# Patient Record
Sex: FEMALE | Race: BLACK OR AFRICAN AMERICAN | Employment: FULL TIME | URBAN - METROPOLITAN AREA
[De-identification: names, ages, dates, MRNs, and addresses within clinical notes are randomized per-mention and may not be internally consistent; named-entity substitution may affect disease eponyms.]

---

## 2017-01-11 ENCOUNTER — DOCUMENTATION ONLY (OUTPATIENT)
Dept: NEUROLOGY | Age: 42
End: 2017-01-11

## 2017-03-09 ENCOUNTER — OFFICE VISIT (OUTPATIENT)
Dept: NEUROLOGY | Age: 42
End: 2017-03-09

## 2017-03-09 VITALS
HEIGHT: 61 IN | BODY MASS INDEX: 55.32 KG/M2 | DIASTOLIC BLOOD PRESSURE: 62 MMHG | SYSTOLIC BLOOD PRESSURE: 110 MMHG | WEIGHT: 293 LBS | HEART RATE: 89 BPM | OXYGEN SATURATION: 98 %

## 2017-03-09 DIAGNOSIS — E11.40 TYPE 2 DIABETES MELLITUS WITH DIABETIC NEUROPATHY, WITHOUT LONG-TERM CURRENT USE OF INSULIN (HCC): ICD-10-CM

## 2017-03-09 DIAGNOSIS — G93.2 PSEUDOTUMOR CEREBRI: Primary | ICD-10-CM

## 2017-03-09 DIAGNOSIS — F51.01 PRIMARY INSOMNIA: ICD-10-CM

## 2017-03-09 DIAGNOSIS — M79.7 FIBROMYALGIA: ICD-10-CM

## 2017-03-09 DIAGNOSIS — G47.01 INSOMNIA DUE TO MEDICAL CONDITION: ICD-10-CM

## 2017-03-09 RX ORDER — METHOCARBAMOL 750 MG/1
TABLET, FILM COATED ORAL 4 TIMES DAILY
COMMUNITY
End: 2019-05-01 | Stop reason: SDUPTHER

## 2017-03-09 RX ORDER — DIGOXIN 125 MCG
0.12 TABLET ORAL DAILY
Qty: 30 TAB | Refills: 3 | Status: SHIPPED | OUTPATIENT
Start: 2017-03-09 | End: 2017-09-26 | Stop reason: SDUPTHER

## 2017-03-09 RX ORDER — QUETIAPINE FUMARATE 50 MG/1
50 TABLET, FILM COATED ORAL
Qty: 30 TAB | Refills: 4 | Status: SHIPPED | OUTPATIENT
Start: 2017-03-09 | End: 2017-08-21 | Stop reason: SDUPTHER

## 2017-03-09 RX ORDER — ONDANSETRON 4 MG/1
4 TABLET, FILM COATED ORAL
Qty: 20 TAB | Refills: 4 | Status: SHIPPED | OUTPATIENT
Start: 2017-03-09 | End: 2018-10-05

## 2017-03-09 NOTE — MR AVS SNAPSHOT
Visit Information Date & Time Provider Department Dept. Phone Encounter #  
 3/9/2017 11:40 AM Jose Manuel Beltre MD [facility] at [facility] [phone] [id number] Follow-up Instructions Return in about 1 month (around 4/9/2017). Upcoming Health Maintenance Date Due HEMOGLOBIN A1C Q6M 1973 LIPID PANEL Q1 1973 FOOT EXAM Q1 11/4/1983 MICROALBUMIN Q1 11/4/1983 EYE EXAM RETINAL OR DILATED Q1 11/4/1983 Pneumococcal 19-64 Medium Risk (1 of 1 - PPSV23) 11/4/1992 DTaP/Tdap/Td series (1 - Tdap) 11/4/1994 PAP AKA CERVICAL CYTOLOGY 11/4/1994 INFLUENZA AGE 9 TO ADULT 8/1/2016 Allergies as of 3/9/2017  Review Complete On: 3/9/2017 By: Brandy Quiroga Severity Noted Reaction Type Reactions Lemon High 08/07/2012    Anaphylaxis Adhesive Tape-silicones  84/26/7488    Other (comments) Pt reports it burns her skin Dilaudid [Hydromorphone (Pf)]  06/21/2010    Itching Fentanyl  06/21/2010    Shortness of Breath Percocet [Oxycodone-acetaminophen]  06/21/2010    Unknown (comments) Pt states not allergic Current Immunizations  Reviewed on 7/14/2015 Name Date Influenza Vaccine PF  Deferred (Patient Refused) Influenza Vaccine Whole 2/1/2011 Pneumococcal Polysaccharide (PPSV-23)  Deferred (Patient Refused) Not reviewed this visit You Were Diagnosed With   
  
 Codes Comments Pseudotumor cerebri    -  Primary ICD-10-CM: G93.2 ICD-9-CM: 175. 2 Fibromyalgia     ICD-10-CM: M79.7 ICD-9-CM: 729.1 Type 2 diabetes mellitus with diabetic neuropathy, without long-term current use of insulin (HCC)     ICD-10-CM: E11.40 ICD-9-CM: 250.60, 357.2 Insomnia due to medical condition     ICD-10-CM: G47.01 
ICD-9-CM: 327.01 Primary insomnia     ICD-10-CM: F51.01 
ICD-9-CM: 307.42 Vitals BP Pulse Height(growth percentile) Weight(growth percentile) SpO2 BMI 110/62 89 5' 1\" (1.549 m) 296 lb (134.3 kg) 98% 55.93 kg/m2 OB Status Smoking Status Having regular periods Never Smoker BMI and BSA Data Body Mass Index Body Surface Area 55.93 kg/m 2 2.4 m 2 Preferred Pharmacy Pharmacy Name Phone [facility] [place] [address] [phone] Your Updated Medication List  
  
   
This list is accurate as of: 3/9/17 12:38 PM.  Always use your most recent med list.  
  
  
  
  
 acetaZOLAMIDE  mg capsule Commonly known as:  DIAMOX Take 2 Caps by mouth two (2) times a day. acyclovir 200 mg capsule Commonly known as:  ZOVIRAX Take 200 mg by mouth two (2) times a day. atenolol 100 mg tablet Commonly known as:  TENORMIN Take 100 mg by mouth two (2) times a day. * baclofen 10 mg tablet Commonly known as:  LIORESAL Take one three times a day for one week then twice a day for one week then once a day for one week then stop * baclofen 10 mg tablet Commonly known as:  LIORESAL Take 20 mg by mouth three (3) times daily. BENADRYL 25 mg capsule Generic drug:  diphenhydrAMINE Take 25 mg by mouth as needed. Indications: [facility] - only takes with evette  
  
 cyanocobalamin 1,000 mcg/mL injection Commonly known as:  VITAMIN B12  
1 mL by IntraMUSCular route every thirty (30) days. diazePAM 5 mg tablet Commonly known as:  VALIUM Take 1 Tab by mouth every twelve (12) hours as needed (spasm). Max Daily Amount: 10 mg.  
  
 digoxin 0.125 mg tablet Commonly known as:  LANOXIN Take 1 Tab by mouth daily. metFORMIN  mg tablet Commonly known as:  GLUCOPHAGE XR Take 1,000 mg by mouth daily (with dinner). Takes 2 tabs of 500 mg to equal 1000 mg  
  
 methocarbamol 750 mg tablet Commonly known as:  ROBAXIN Take  by mouth four (4) times daily. ondansetron hcl 4 mg tablet Commonly known as:  ZOFRAN (AS HYDROCHLORIDE) Take 1 Tab by mouth every eight (8) hours as needed for Nausea. oxyCODONE-acetaminophen 5-325 mg per tablet Commonly known as:  PERCOCET Take 1 Tab by mouth every six (6) hours as needed for Pain. Max Daily Amount: 4 Tabs. pregabalin 100 mg capsule Commonly known as:  [place] Take 1 Cap by mouth two (2) times a day. Max Daily Amount: 200 mg. QUEtiapine 50 mg tablet Commonly known as:  SEROquel Take 1 Tab by mouth nightly. triazolam 0.25 mg tablet Commonly known as:  Cinthia Pauling Take 0.25 mg by mouth nightly as needed. zonisamide 100 mg capsule Commonly known as:  Garnette Lanes Take 400 mg by mouth nightly. Takes 4 caps of 100 mg to equal 400 mg * Notice: This list has 2 medication(s) that are the same as other medications prescribed for you. Read the directions carefully, and ask your doctor or other care provider to review them with you. Prescriptions Sent to Pharmacy Refills  
 digoxin (LANOXIN) 0.125 mg tablet 3 Sig: Take 1 Tab by mouth daily. Class: Normal  
 Pharmacy: [address] [place] [address] Ph #: [phone] Route: Oral  
 QUEtiapine (SEROQUEL) 50 mg tablet 4 Sig: Take 1 Tab by mouth nightly. Class: Normal  
 Pharmacy: [address] [place] [address] Ph #: [phone] Route: Oral  
 ondansetron hcl (ZOFRAN, AS HYDROCHLORIDE,) 4 mg tablet 4 Sig: Take 1 Tab by mouth every eight (8) hours as needed for Nausea. Class: Normal  
 Pharmacy: [address] [place] [address] Ph #: [phone] Route: Oral  
  
Follow-up Instructions Return in about 1 month (around 4/9/2017). To-Do List   
 03/09/2017 Imaging:  CT HEAD WO CONT   
  
 03/09/2017 Imaging:  XR INJ SPINE FLUORO GUIDE Patient Instructions A Healthy Lifestyle: Care Instructions Your Care Instructions A healthy lifestyle can help you feel good, stay at a healthy weight, and have plenty of energy for both work and play. A healthy lifestyle is something you can share with your whole family. A healthy lifestyle also can lower your risk for serious health problems, such as high blood pressure, heart disease, and diabetes. You can follow a few steps listed below to improve your health and the health of your family. Follow-up care is a key part of your treatment and safety. Be sure to make and go to all appointments, and call your doctor if you are having problems. Its also a good idea to know your test results and keep a list of the medicines you take. How can you care for yourself at home? · Do not eat too much sugar, fat, or fast foods. You can still have dessert and treats now and then. The goal is moderation. · Start small to improve your eating habits. Pay attention to portion sizes, drink less juice and soda pop, and eat more fruits and vegetables. ¨ Eat a healthy amount of food. A 3-ounce serving of meat, for example, is about the size of a deck of cards. Fill the rest of your plate with vegetables and whole grains. ¨ Limit the amount of soda and sports drinks you have every day. Drink more water when you are thirsty. ¨ Eat at least 5 servings of fruits and vegetables every day. It may seem like a lot, but it is not hard to reach this goal. A serving or helping is 1 piece of fruit, 1 cup of vegetables, or 2 cups of leafy, raw vegetables. Have an apple or some carrot sticks as an afternoon snack instead of a candy bar. Try to have fruits and/or vegetables at every meal. 
· Make exercise part of your daily routine. You may want to start with simple activities, such as walking, bicycling, or slow swimming. Try to be active 30 to 60 minutes every day.  You do not need to do all 30 to 60 minutes all at once. For example, you can exercise 3 times a day for 10 or 20 minutes. Moderate exercise is safe for most people, but it is always a good idea to talk to your doctor before starting an exercise program. 
· Keep moving. Sravanthi Parody the lawn, work in the garden, or "[employer]. Take the stairs instead of the elevator at work. · If you smoke, quit. People who smoke have an increased risk for heart attack, stroke, cancer, and other lung illnesses. Quitting is hard, but there are ways to boost your chance of quitting tobacco for good. ¨ Use nicotine gum, patches, or lozenges. ¨ Ask your doctor about stop-smoking programs and medicines. ¨ Keep trying. In addition to reducing your risk of diseases in the future, you will notice some benefits soon after you stop using tobacco. If you have shortness of breath or asthma symptoms, they will likely get better within a few weeks after you quit. · Limit how much alcohol you drink. Moderate amounts of alcohol (up to 2 drinks a day for men, 1 drink a day for women) are okay. But drinking too much can lead to liver problems, high blood pressure, and other health problems. Family health If you have a family, there are many things you can do together to improve your health. · Eat meals together as a family as often as possible. · Eat healthy foods. This includes fruits, vegetables, lean meats and dairy, and whole grains. · Include your family in your fitness plan. Most people think of activities such as jogging or tennis as the way to fitness, but there are many ways you and your family can be more active. Anything that makes you breathe hard and gets your heart pumping is exercise. Here are some tips: 
¨ Walk to do errands or to take your child to school or the bus. ¨ Go for a family bike ride after dinner instead of watching TV. Where can you learn more? Go to http://dusty-ari.info/. Enter U302 in the search box to learn more about \"A Healthy Lifestyle: Care Instructions. \" Current as of: July 26, 2016 Content Version: 11.1 © 8403-7081 [employer]. Care instructions adapted under license by [employer] (which disclaims liability or warranty for this information). If you have questions about a medical condition or this instruction, always ask your healthcare professional. [address] any warranty or liability for your use of this information. Introducing [facility]! [facility] introduces We patient portal. Now you can access parts of your medical record, email your doctor's office, and request medication refills online. 1. In your internet browser, go to [email or web address] 2. Click on the First Time User? Click Here link in the Sign In box. You will see the New Member Sign Up page. 3. Enter your We Access Code exactly as it appears below. You will not need to use this code after youve completed the sign-up process. If you do not sign up before the expiration date, you must request a new code. · We Access Code: [email or web address] Expires: 6/7/2017 12:20 PM 
 
4. Enter the last four digits of your Social Security Number (xxxx) and Date of Birth (mm/dd/yyyy) as indicated and click Submit. You will be taken to the next sign-up page. 5. Create a We ID. This will be your We login ID and cannot be changed, so think of one that is secure and easy to remember. 6. Create a We password. You can change your password at any time. 7. Enter your Password Reset Question and Answer. This can be used at a later time if you forget your password. 8. Enter your e-mail address. You will receive e-mail notification when new information is available in [address] 9. Click Sign Up. You can now view and download portions of your medical record. 10. Click the Download Summary menu link to download a portable copy of your medical information. If you have questions, please visit the Frequently Asked Questions section of the K[employer] website. Remember, [employer] is NOT to be used for urgent needs. For medical emergencies, dial 911. Now available from your iPhone and Android! Please provide this summary of care documentation to your next provider. Your primary care clinician is listed as Christine Vargas. If you have any questions after today's visit, please call [phone].
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
## 2017-03-09 NOTE — PATIENT INSTRUCTIONS

## 2017-03-13 ENCOUNTER — TELEPHONE (OUTPATIENT)
Dept: NEUROLOGY | Age: 42
End: 2017-03-13

## 2017-03-13 DIAGNOSIS — G93.2 PSEUDOTUMOR CEREBRI: Primary | ICD-10-CM

## 2017-03-13 NOTE — TELEPHONE ENCOUNTER
Rec'd email from Melissa/SONYA - re:  LP order - Please have dr Sergio Hernandez place order. The original order was placed incorrectly.

## 2017-03-16 ENCOUNTER — TELEPHONE (OUTPATIENT)
Dept: NEUROLOGY | Age: 42
End: 2017-03-16

## 2017-03-20 ENCOUNTER — HOSPITAL ENCOUNTER (OUTPATIENT)
Dept: GENERAL RADIOLOGY | Age: 42
Discharge: HOME | End: 2017-03-20
Attending: PSYCHIATRY & NEUROLOGY
Payer: COMMERCIAL

## 2017-03-20 VITALS
SYSTOLIC BLOOD PRESSURE: 125 MMHG | TEMPERATURE: 97.9 F | OXYGEN SATURATION: 98 % | DIASTOLIC BLOOD PRESSURE: 59 MMHG | HEART RATE: 84 BPM

## 2017-03-20 DIAGNOSIS — G93.2 BENIGN INTRACRANIAL HYPERTENSION: ICD-10-CM

## 2017-03-20 PROCEDURE — 74011000250 HC RX REV CODE- 250: Performed by: RADIOLOGY

## 2017-03-20 PROCEDURE — 62270 DX LMBR SPI PNXR: CPT

## 2017-03-20 RX ORDER — LIDOCAINE HYDROCHLORIDE 10 MG/ML
10 INJECTION INFILTRATION; PERINEURAL
Status: COMPLETED | OUTPATIENT
Start: 2017-03-20 | End: 2017-03-20

## 2017-03-20 RX ADMIN — LIDOCAINE HYDROCHLORIDE 1 ML: 10 INJECTION, SOLUTION INFILTRATION; PERINEURAL at 10:00

## 2017-03-20 RX ADMIN — SODIUM BICARBONATE 5 ML: 0.2 INJECTION, SOLUTION INTRAVENOUS at 10:00

## 2017-03-20 NOTE — PROGRESS NOTES
Discharge instructions reviewed with understanding, copy given to patient. Pt discharged home w/ friend, assisted out by RN.

## 2017-03-20 NOTE — DISCHARGE INSTRUCTIONS
[address] Procedures/Radiology Department    Radiologist:  Dr. Ríos January    Date: 3/20/2017    Lumbar Puncture Discharge Instructions    Remain flat in bed or on the sofa for the next 24 hours. Drink plenty of water over the next 24 to 48 hours. Avoid caffeine products. Resume your previous diet and follow the medication reconciliation form. You make take Tylenol, as directed on the label, for pain or headache. Do not take aspirin or ibuprofen (Motrin or Advil) for the next 48 hours as it may cause you to bleed. Restrict your activity for the next 24 to 48 hours. No strenuous work or activities for the next 24 to 48 hours. Follow up with your referring physician for test results. If you have any questions or concerns, call [phone] and ask to speak to the nurse on-call.

## 2017-03-20 NOTE — IP AVS SNAPSHOT
[address] [facility] 
[phone] Patient: Ryan Barrera MRN: [id number] IXL:[id number] You are allergic to the following Allergen Reactions Lemon Anaphylaxis Adhesive Tape-Silicones Other (comments) Pt reports it burns her skin Dilaudid (Hydromorphone (Pf)) Itching Fentanyl Shortness of Breath Percocet (Oxycodone-Acetaminophen) Unknown (comments) Pt states not allergic Recent Documentation OB Status Smoking Status Having regular periods Never Smoker Emergency Contacts Name Discharge Info Relation Home Work Mobile Alfonso De Oliveira  Parent [1] [phone] Lilly De Oliveira DISCHARGE CAREGIVER [3] Mother [14] [phone] About your hospitalization You were admitted on:  March 20, 2017 You last received care in the:  [facility] RADIOLOGY You were discharged on:  March 20, 2017 Unit phone number:  [phone] Why you were hospitalized Your primary diagnosis was:  Not on File Providers Seen During Your Hospitalizations Provider Role Specialty Primary office phone Shivam Ruvalcaba MD Attending Provider Neurology [phone] Your Primary Care Physician (PCP) Primary Care Physician Office Phone Office Fax Madisonkaylan Niharika [phone] Follow-up Information None Your Appointments Saturday March 25, 2017 10:00 AM EDT  
CT HEAD WO CONT with [facility] CT 2 MRM RAD CT ([address]) [address] [facility]  
[phone] NON-CONTRAST STUDY: 1. Bring any non [facility] facility films/images pertaining to the area of interest with you on the day of appointment. 2. Check in at registration at least 30 minutes before appt time unless you were instructed to do otherwise.  3. If you have to drink oral contrast please pick it up any weekday prior to your appointment, if you cannot please check in 2 hrs  before appt time. Patient should report to outpatient registration ([facility]) 30 minutes prior to the appointment time unless instructed otherwise. Tuesday April 25, 2017 10:00 AM EDT Follow Up with Mallory Aguila MD  
[facility]) [address] [place] [address] [facility]  
[phone] Current Discharge Medication List  
  
ASK your doctor about these medications Dose & Instructions Dispensing Information Comments Morning Noon Evening Bedtime  
 acetaZOLAMIDE  mg capsule Commonly known as:  DIAMOX Your last dose was: Your next dose is:    
   
   
 Dose:  1000 mg Take 2 Caps by mouth two (2) times a day. Quantity:  60 Cap Refills:  2  
     
   
   
   
  
 acyclovir 200 mg capsule Commonly known as:  ZOVIRAX Your last dose was: Your next dose is:    
   
   
 Dose:  200 mg Take 200 mg by mouth two (2) times a day. Refills:  0  
     
   
   
   
  
 atenolol 100 mg tablet Commonly known as:  TENORMIN Your last dose was: Your next dose is:    
   
   
 Dose:  100 mg Take 100 mg by mouth two (2) times a day. Refills:  0  
     
   
   
   
  
 * baclofen 10 mg tablet Commonly known as:  LIORESAL Your last dose was: Your next dose is: Take one three times a day for one week then twice a day for one week then once a day for one week then stop Quantity:  60 Tab Refills:  0  
     
   
   
   
  
 * baclofen 10 mg tablet Commonly known as:  LIORESAL Your last dose was: Your next dose is:    
   
   
 Dose:  20 mg Take 20 mg by mouth three (3) times daily. Refills:  0  
     
   
   
   
  
 BENADRYL 25 mg capsule Generic drug:  diphenhydrAMINE Your last dose was: Your next dose is:    
   
   
 Dose:  25 mg Take 25 mg by mouth as needed. Indications: Allerigc [facility] - only takes with evette Refills:  0  
     
   
   
   
  
 cyanocobalamin 1,000 mcg/mL injection Commonly known as:  VITAMIN B12 Your last dose was: Your next dose is:    
   
   
 Dose:  1000 mcg 1 mL by IntraMUSCular route every thirty (30) days. Quantity:  4 Vial  
Refills:  3  
     
   
   
   
  
 diazePAM 5 mg tablet Commonly known as:  VALIUM Your last dose was: Your next dose is:    
   
   
 Dose:  5 mg Take 1 Tab by mouth every twelve (12) hours as needed (spasm). Max Daily Amount: 10 mg.  
 Quantity:  10 Tab Refills:  0  
     
   
   
   
  
 digoxin 0.125 mg tablet Commonly known as:  LANOXIN Your last dose was: Your next dose is:    
   
   
 Dose:  0.125 mg Take 1 Tab by mouth daily. Quantity:  30 Tab Refills:  3  
     
   
   
   
  
 metFORMIN  mg tablet Commonly known as:  GLUCOPHAGE XR Your last dose was: Your next dose is:    
   
   
 Dose:  1000 mg Take 1,000 mg by mouth daily (with dinner). Takes 2 tabs of 500 mg to equal 1000 mg Refills:  0  
     
   
   
   
  
 methocarbamol 750 mg tablet Commonly known as:  ROBAXIN Your last dose was: Your next dose is: Take  by mouth four (4) times daily. Refills:  0  
     
   
   
   
  
 ondansetron hcl 4 mg tablet Commonly known as:  ZOFRAN (AS HYDROCHLORIDE) Your last dose was: Your next dose is:    
   
   
 Dose:  4 mg Take 1 Tab by mouth every eight (8) hours as needed for Nausea. Quantity:  20 Tab Refills:  4  
     
   
   
   
  
 oxyCODONE-acetaminophen 5-325 mg per tablet Commonly known as:  PERCOCET Your last dose was: Your next dose is:    
   
   
 Dose:  1 Tab Take 1 Tab by mouth every six (6) hours as needed for Pain. Max Daily Amount: 4 Tabs. Quantity:  10 Tab Refills:  0  
     
   
   
   
  
 pregabalin 100 mg capsule Commonly known as:  Austen Gustafson Your last dose was: Your next dose is:    
   
   
 Dose:  100 mg Take 1 Cap by mouth two (2) times a day. Max Daily Amount: 200 mg. Quantity:  60 Cap Refills:  3 QUEtiapine 50 mg tablet Commonly known as:  SEROquel Your last dose was: Your next dose is:    
   
   
 Dose:  50 mg Take 1 Tab by mouth nightly. Quantity:  30 Tab Refills:  4  
     
   
   
   
  
 triazolam 0.25 mg tablet Commonly known as:  Harrel Goad Your last dose was: Your next dose is:    
   
   
 Dose:  0.25 mg Take 0.25 mg by mouth nightly as needed. Refills:  0  
     
   
   
   
  
 zonisamide 100 mg capsule Commonly known as:  Mya Haider Your last dose was: Your next dose is:    
   
   
 Dose:  400 mg Take 400 mg by mouth nightly. Takes 4 caps of 100 mg to equal 400 mg Refills:  0  
     
   
   
   
  
 * Notice: This list has 2 medication(s) that are the same as other medications prescribed for you. Read the directions carefully, and ask your doctor or other care provider to review them with you. Discharge Instructions [facility] Special Procedures/Radiology Department Radiologist:  Dr. Lisa Hendricks Date: 3/20/2017 Lumbar Puncture Discharge Instructions Remain flat in bed or on the sofa for the next 24 hours. Drink plenty of water over the next 24 to 48 hours. Avoid caffeine products. Resume your previous diet and follow the medication reconciliation form. You make take Tylenol, as directed on the label, for pain or headache. Do not take aspirin or ibuprofen (Motrin or Advil) for the next 48 hours as it may cause you to bleed. Restrict your activity for the next 24 to 48 hours. No strenuous work or activities for the next 24 to 48 hours. Follow up with your referring physician for test results. If you have any questions or concerns, call [phone] and ask to speak to the nurse on-call. Discharge Orders None Introducing [facility] SERVICES! Patricia Garcia introduces [employer] patient portal. Now you can access parts of your medical record, email your doctor's office, and request medication refills online. 1. In your internet browser, go to [email or web address] 2. Click on the First Time User? Click Here link in the Sign In box. You will see the New Member Sign Up page. 3. Enter your [employer] Access Code exactly as it appears below. You will not need to use this code after youve completed the sign-up process. If you do not sign up before the expiration date, you must request a new code. · [employer] Access Code: [email or web address] Expires: 6/7/2017  1:20 PM 
 
4. Enter the last four digits of your Social Security Number (xxxx) and Date of Birth (mm/dd/yyyy) as indicated and click Submit. You will be taken to the next sign-up page. 5. Create a [employer] ID. This will be your [employer] login ID and cannot be changed, so think of one that is secure and easy to remember. 6. Create a [employer] password. You can change your password at any time. 7. Enter your Password Reset Question and Answer. This can be used at a later time if you forget your password. 8. Enter your e-mail address. You will receive e-mail notification when new information is available in [address] 9. Click Sign Up. You can now view and download portions of your medical record. 10. Click the Download Summary menu link to download a portable copy of your medical information. If you have questions, please visit the Frequently Asked Questions section of the [employer] website.  Remember, [employer] is NOT to be used for urgent needs. For medical emergencies, dial 911. Now available from your iPhone and Android! General Information Please provide this summary of care documentation to your next provider. Patient Signature:  ____________________________________________________________ Date:  ____________________________________________________________  
  
Roz Hero Provider Signature:  ____________________________________________________________ Date:  ____________________________________________________________

## 2017-03-25 ENCOUNTER — HOSPITAL ENCOUNTER (OUTPATIENT)
Dept: CT IMAGING | Age: 42
Discharge: HOME | End: 2017-03-25
Attending: PSYCHIATRY & NEUROLOGY
Payer: COMMERCIAL

## 2017-03-25 DIAGNOSIS — G93.2 PSEUDOTUMOR CEREBRI: ICD-10-CM

## 2017-03-25 PROCEDURE — 70450 CT HEAD/BRAIN W/O DYE: CPT

## 2017-04-25 ENCOUNTER — OFFICE VISIT (OUTPATIENT)
Dept: NEUROLOGY | Age: 42
End: 2017-04-25

## 2017-04-25 VITALS
WEIGHT: 293 LBS | HEART RATE: 95 BPM | BODY MASS INDEX: 57.82 KG/M2 | SYSTOLIC BLOOD PRESSURE: 110 MMHG | DIASTOLIC BLOOD PRESSURE: 82 MMHG | OXYGEN SATURATION: 97 %

## 2017-04-25 DIAGNOSIS — R56.9 SEIZURES (HCC): ICD-10-CM

## 2017-04-25 DIAGNOSIS — G93.2 PSEUDOTUMOR CEREBRI: Primary | ICD-10-CM

## 2017-04-25 DIAGNOSIS — E11.40 TYPE 2 DIABETES MELLITUS WITH DIABETIC NEUROPATHY, WITHOUT LONG-TERM CURRENT USE OF INSULIN (HCC): ICD-10-CM

## 2017-04-25 DIAGNOSIS — M79.7 FIBROMYALGIA: ICD-10-CM

## 2017-04-25 RX ORDER — ZONISAMIDE 100 MG/1
200 CAPSULE ORAL 2 TIMES DAILY
Qty: 120 CAP | Refills: 3 | Status: SHIPPED | OUTPATIENT
Start: 2017-04-25 | End: 2017-09-26 | Stop reason: SDUPTHER

## 2017-04-25 RX ORDER — AMITRIPTYLINE HYDROCHLORIDE 50 MG/1
TABLET, FILM COATED ORAL
Refills: 0 | COMMUNITY
Start: 2017-04-05 | End: 2018-02-23 | Stop reason: ALTCHOICE

## 2017-04-25 NOTE — MR AVS SNAPSHOT
Visit Information Date & Time Provider Department Dept. Phone Encounter #  
 4/25/2017 10:00 AM Tito Larry MD Neurology [facility] at [facility] [phone] [id number] Upcoming Health Maintenance Date Due HEMOGLOBIN A1C Q6M 1975 LIPID PANEL Q1 1973 FOOT EXAM Q1 11/4/1983 MICROALBUMIN Q1 11/4/1983 EYE EXAM RETINAL OR DILATED Q1 11/4/1983 Pneumococcal 19-64 Medium Risk (1 of 1 - PPSV23) 11/4/1992 DTaP/Tdap/Td series (1 - Tdap) 11/4/1994 PAP AKA CERVICAL CYTOLOGY 11/4/1994 INFLUENZA AGE 9 TO ADULT 8/1/2016 Allergies as of 4/25/2017  Review Complete On: 4/25/2017 By: Madelyn Tijerina Severity Noted Reaction Type Reactions Lemon High 08/07/2012    Anaphylaxis Adhesive Tape-silicones  87/72/1590    Other (comments) Pt reports it burns her skin Dilaudid [Hydromorphone (Pf)]  06/21/2010    Itching Fentanyl  06/21/2010    Shortness of Breath Percocet [Oxycodone-acetaminophen]  06/21/2010    Unknown (comments) Pt states not allergic Current Immunizations  Reviewed on 7/14/2015 Name Date Influenza Vaccine PF  Deferred (Patient Refused) Influenza Vaccine Whole 2/1/2011 Pneumococcal Polysaccharide (PPSV-23)  Deferred (Patient Refused) Not reviewed this visit You Were Diagnosed With   
  
 Codes Comments Pseudotumor cerebri    -  Primary ICD-10-CM: G93.2 ICD-9-CM: 129. 2 Seizures ([facility] [address]     ICD-10-CM: R56.9 ICD-9-CM: 780.39 Vitals BP Pulse Weight(growth percentile) SpO2 BMI OB Status 110/82 95 306 lb (138.8 kg) 97% 57.82 kg/m2 Having regular periods Smoking Status Never Smoker Vitals History BMI and BSA Data Body Mass Index Body Surface Area  
 57.82 kg/m 2 2.44 m 2 Preferred Pharmacy Pharmacy Name Phone [id number] [address] [place] [address] [phone] Your Updated Medication List  
  
   
This list is accurate as of: 4/25/17 11:02 AM.  Always use your most recent med list.  
  
  
  
  
 acetaZOLAMIDE  mg capsule Commonly known as:  DIAMOX Take 2 Caps by mouth two (2) times a day. acyclovir 200 mg capsule Commonly known as:  ZOVIRAX Take 200 mg by mouth two (2) times a day. amitriptyline 50 mg tablet Commonly known as:  ELAVIL  
take 1 tablet by mouth at bedtime  
  
 atenolol 100 mg tablet Commonly known as:  TENORMIN Take 100 mg by mouth two (2) times a day. * baclofen 10 mg tablet Commonly known as:  LIORESAL Take one three times a day for one week then twice a day for one week then once a day for one week then stop * baclofen 10 mg tablet Commonly known as:  LIORESAL Take 20 mg by mouth three (3) times daily. BENADRYL 25 mg capsule Generic drug:  diphenhydrAMINE Take 25 mg by mouth as needed. Indications: Allerigc to [facility] - only takes with evette  
  
 cyanocobalamin 1,000 mcg/mL injection Commonly known as:  VITAMIN B12  
1 mL by IntraMUSCular route every thirty (30) days. diazePAM 5 mg tablet Commonly known as:  VALIUM Take 1 Tab by mouth every twelve (12) hours as needed (spasm). Max Daily Amount: 10 mg.  
  
 digoxin 0.125 mg tablet Commonly known as:  LANOXIN Take 1 Tab by mouth daily. metFORMIN  mg tablet Commonly known as:  GLUCOPHAGE XR Take 1,000 mg by mouth daily (with dinner). Takes 2 tabs of 500 mg to equal 1000 mg  
  
 methocarbamol 750 mg tablet Commonly known as:  ROBAXIN Take  by mouth four (4) times daily. ondansetron hcl 4 mg tablet Commonly known as:  ZOFRAN (AS HYDROCHLORIDE) Take 1 Tab by mouth every eight (8) hours as needed for Nausea. oxyCODONE-acetaminophen 5-325 mg per tablet Commonly known as:  PERCOCET Take 1 Tab by mouth every six (6) hours as needed for Pain. Max Daily Amount: 4 Tabs. pregabalin 100 mg capsule Commonly known as:  Blanca Crowley Take 1 Cap by mouth two (2) times a day. Max Daily Amount: 200 mg. QUEtiapine 50 mg tablet Commonly known as:  SEROquel Take 1 Tab by mouth nightly. triazolam 0.25 mg tablet Commonly known as:  Ayla Sham Take 0.25 mg by mouth nightly as needed. zonisamide 100 mg capsule Commonly known as:  Yunior Valenzuela Take 2 Caps by mouth two (2) times a day. * Notice: This list has 2 medication(s) that are the same as other medications prescribed for you. Read the directions carefully, and ask your doctor or other care provider to review them with you. Prescriptions Sent to Pharmacy Refills  
 zonisamide (ZONEGRAN) 100 mg capsule 3 Sig: Take 2 Caps by mouth two (2) times a day. Class: Normal  
 Pharmacy: [address] [place] [address] #: [phone] Route: Oral  
  
We Performed the Following VISUAL FIELD ASSESSMENT PHYS REVIEW AND REPORT [0378T CPT(R)] Patient Instructions A Healthy Lifestyle: Care Instructions Your Care Instructions A healthy lifestyle can help you feel good, stay at a healthy weight, and have plenty of energy for both work and play. A healthy lifestyle is something you can share with your whole family. A healthy lifestyle also can lower your risk for serious health problems, such as high blood pressure, heart disease, and diabetes. You can follow a few steps listed below to improve your health and the health of your family. Follow-up care is a key part of your treatment and safety. Be sure to make and go to all appointments, and call your doctor if you are having problems. Its also a good idea to know your test results and keep a list of the medicines you take. How can you care for yourself at home? · Do not eat too much sugar, fat, or fast foods. You can still have dessert and treats now and then. The goal is moderation. · Start small to improve your eating habits. Pay attention to portion sizes, drink less juice and soda pop, and eat more fruits and vegetables. ¨ Eat a healthy amount of food. A 3-ounce serving of meat, for example, is about the size of a deck of cards. Fill the rest of your plate with vegetables and whole grains. ¨ Limit the amount of soda and sports drinks you have every day. Drink more water when you are thirsty. ¨ Eat at least 5 servings of fruits and vegetables every day. It may seem like a lot, but it is not hard to reach this goal. A serving or helping is 1 piece of fruit, 1 cup of vegetables, or 2 cups of leafy, raw vegetables. Have an apple or some carrot sticks as an afternoon snack instead of a candy bar. Try to have fruits and/or vegetables at every meal. 
· Make exercise part of your daily routine. You may want to start with simple activities, such as walking, bicycling, or slow swimming. Try to be active 30 to 60 minutes every day. You do not need to do all 30 to 60 minutes all at once. For example, you can exercise 3 times a day for 10 or 20 minutes. Moderate exercise is safe for most people, but it is always a good idea to talk to your doctor before starting an exercise program. 
· Keep moving. Janeal Parents the lawn, work in the garden, or [employer]. Take the stairs instead of the elevator at work. · If you smoke, quit. People who smoke have an increased risk for heart attack, stroke, cancer, and other lung illnesses. Quitting is hard, but there are ways to boost your chance of quitting tobacco for good. ¨ Use nicotine gum, patches, or lozenges. ¨ Ask your doctor about stop-smoking programs and medicines. ¨ Keep trying. In addition to reducing your risk of diseases in the future, you will notice some benefits soon after you stop using tobacco. If you have shortness of breath or asthma symptoms, they will likely get better within a few weeks after you quit. · Limit how much alcohol you drink. Moderate amounts of alcohol (up to 2 drinks a day for men, 1 drink a day for women) are okay. But drinking too much can lead to liver problems, high blood pressure, and other health problems. Family health If you have a family, there are many things you can do together to improve your health. · Eat meals together as a family as often as possible. · Eat healthy foods. This includes fruits, vegetables, lean meats and dairy, and whole grains. · Include your family in your fitness plan. Most people think of activities such as jogging or tennis as the way to fitness, but there are many ways you and your family can be more active. Anything that makes you breathe hard and gets your heart pumping is exercise. Here are some tips: 
¨ Walk to do errands or to take your child to school or the bus. ¨ Go for a family bike ride after dinner instead of watching TV. Where can you learn more? Go to http://dusty-ari.info/. Enter C782 in the search box to learn more about \"A Healthy Lifestyle: Care Instructions. \" Current as of: July 26, 2016 Content Version: 11.2 © 2023-2773 [employer]. Care instructions adapted under license by [employer] (which disclaims liability or warranty for this information). If you have questions about a medical condition or this instruction, always ask your healthcare professional. [place] [address] any warranty or liability for your use of this information. Introducing [facility] SERVICES! [employer] introduces [email or web address] patient portal. Now you can access parts of your medical record, email your doctor's office, and request medication refills online. 1. In your internet browser, go to [email or web address] 2. Click on the First Time User? Click Here link in the Sign In box. You will see the New Member Sign Up page. 3. Enter your [employer] Access Code exactly as it appears below. You will not need to use this code after youve completed the sign-up process. If you do not sign up before the expiration date, you must request a new code. · [employer] Access Code: [email or web address] Expires: 6/7/2017  1:20 PM 
 
4. Enter the last four digits of your Social Security Number (xxxx) and Date of Birth (mm/dd/yyyy) as indicated and click Submit. You will be taken to the next sign-up page. 5. Create a [employer] ID. This will be your [employer] login ID and cannot be changed, so think of one that is secure and easy to remember. 6. Create a [employer] password. You can change your password at any time. 7. Enter your Password Reset Question and Answer. This can be used at a later time if you forget your password. 8. Enter your e-mail address. You will receive e-mail notification when new information is available in [address] 9. Click Sign Up. You can now view and download portions of your medical record. 10. Click the Download Summary menu link to download a portable copy of your medical information. If you have questions, please visit the Frequently Asked Questions section of the [employer] website. Remember, [employer] is NOT to be used for urgent needs. For medical emergencies, dial 911. Now available from your iPhone and Android! Please provide this summary of care documentation to your next provider. Your primary care clinician is listed as Christine Vargas. If you have any questions after today's visit, please call [phone].

## 2017-04-25 NOTE — PATIENT INSTRUCTIONS
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
## 2017-05-02 ENCOUNTER — TELEPHONE (OUTPATIENT)
Dept: NEUROLOGY | Age: 42
End: 2017-05-02

## 2017-07-14 DIAGNOSIS — M79.7 FIBROMYALGIA: ICD-10-CM

## 2017-07-18 RX ORDER — PREGABALIN 100 MG/1
CAPSULE ORAL
Qty: 60 CAP | Refills: 3 | Status: SHIPPED | OUTPATIENT
Start: 2017-07-18 | End: 2018-11-06 | Stop reason: SDUPTHER

## 2017-07-19 ENCOUNTER — DOCUMENTATION ONLY (OUTPATIENT)
Dept: NEUROLOGY | Age: 42
End: 2017-07-19

## 2017-08-07 ENCOUNTER — HOSPITAL ENCOUNTER (EMERGENCY)
Age: 42
Discharge: HOME | End: 2017-08-07
Attending: EMERGENCY MEDICINE
Payer: COMMERCIAL

## 2017-08-07 ENCOUNTER — APPOINTMENT (OUTPATIENT)
Dept: CT IMAGING | Age: 42
End: 2017-08-07
Attending: PHYSICIAN ASSISTANT
Payer: COMMERCIAL

## 2017-08-07 VITALS
HEART RATE: 91 BPM | DIASTOLIC BLOOD PRESSURE: 99 MMHG | HEIGHT: 60 IN | BODY MASS INDEX: 56.01 KG/M2 | WEIGHT: 285.27 LBS | RESPIRATION RATE: 16 BRPM | TEMPERATURE: 98 F | OXYGEN SATURATION: 98 % | SYSTOLIC BLOOD PRESSURE: 157 MMHG

## 2017-08-07 DIAGNOSIS — R10.9 LEFT FLANK PAIN: ICD-10-CM

## 2017-08-07 DIAGNOSIS — R31.9 HEMATURIA: ICD-10-CM

## 2017-08-07 DIAGNOSIS — Z86.59 H/O: DEPRESSION: ICD-10-CM

## 2017-08-07 DIAGNOSIS — E86.0 DEHYDRATION: Primary | ICD-10-CM

## 2017-08-07 LAB
ALBUMIN SERPL BCP-MCNC: 3.4 G/DL (ref 3.5–5)
ALBUMIN/GLOB SERPL: 0.8 {RATIO} (ref 1.1–2.2)
ALP SERPL-CCNC: 105 U/L (ref 45–117)
ALT SERPL-CCNC: 23 U/L (ref 12–78)
ANION GAP BLD CALC-SCNC: 6 MMOL/L (ref 5–15)
APPEARANCE UR: ABNORMAL
AST SERPL W P-5'-P-CCNC: 16 U/L (ref 15–37)
BACTERIA URNS QL MICRO: ABNORMAL /HPF
BASOPHILS # BLD AUTO: 0 K/UL (ref 0–0.1)
BASOPHILS # BLD: 0 % (ref 0–1)
BILIRUB SERPL-MCNC: 0.3 MG/DL (ref 0.2–1)
BILIRUB UR QL: NEGATIVE
BUN SERPL-MCNC: 19 MG/DL (ref 6–20)
BUN/CREAT SERPL: 26 (ref 12–20)
CALCIUM SERPL-MCNC: 8.9 MG/DL (ref 8.5–10.1)
CHLORIDE SERPL-SCNC: 109 MMOL/L (ref 97–108)
CO2 SERPL-SCNC: 25 MMOL/L (ref 21–32)
COLOR UR: ABNORMAL
CREAT SERPL-MCNC: 0.74 MG/DL (ref 0.55–1.02)
EOSINOPHIL # BLD: 0.1 K/UL (ref 0–0.4)
EOSINOPHIL NFR BLD: 1 % (ref 0–7)
EPITH CASTS URNS QL MICRO: ABNORMAL /LPF
ERYTHROCYTE [DISTWIDTH] IN BLOOD BY AUTOMATED COUNT: 16.6 % (ref 11.5–14.5)
GLOBULIN SER CALC-MCNC: 4.1 G/DL (ref 2–4)
GLUCOSE SERPL-MCNC: 98 MG/DL (ref 65–100)
GLUCOSE UR STRIP.AUTO-MCNC: NEGATIVE MG/DL
HCG UR QL: NEGATIVE
HCT VFR BLD AUTO: 42.2 % (ref 35–47)
HGB BLD-MCNC: 12.4 G/DL (ref 11.5–16)
HGB UR QL STRIP: ABNORMAL
HYALINE CASTS URNS QL MICRO: ABNORMAL /LPF (ref 0–5)
KETONES UR QL STRIP.AUTO: ABNORMAL MG/DL
LEUKOCYTE ESTERASE UR QL STRIP.AUTO: NEGATIVE
LYMPHOCYTES # BLD AUTO: 17 % (ref 12–49)
LYMPHOCYTES # BLD: 1.8 K/UL (ref 0.8–3.5)
MCH RBC QN AUTO: 25.5 PG (ref 26–34)
MCHC RBC AUTO-ENTMCNC: 29.4 G/DL (ref 30–36.5)
MCV RBC AUTO: 86.8 FL (ref 80–99)
MONOCYTES # BLD: 0.2 K/UL (ref 0–1)
MONOCYTES NFR BLD AUTO: 2 % (ref 5–13)
NEUTS SEG # BLD: 8.9 K/UL (ref 1.8–8)
NEUTS SEG NFR BLD AUTO: 80 % (ref 32–75)
NITRITE UR QL STRIP.AUTO: NEGATIVE
PH UR STRIP: 6 [PH] (ref 5–8)
PLATELET # BLD AUTO: 254 K/UL (ref 150–400)
POTASSIUM SERPL-SCNC: 3.7 MMOL/L (ref 3.5–5.1)
PROT SERPL-MCNC: 7.5 G/DL (ref 6.4–8.2)
PROT UR STRIP-MCNC: ABNORMAL MG/DL
RBC # BLD AUTO: 4.86 M/UL (ref 3.8–5.2)
RBC #/AREA URNS HPF: ABNORMAL /HPF (ref 0–5)
SODIUM SERPL-SCNC: 140 MMOL/L (ref 136–145)
SP GR UR REFRACTOMETRY: 1.02 (ref 1–1.03)
UA: UC IF INDICATED,UAUC: ABNORMAL
UROBILINOGEN UR QL STRIP.AUTO: 0.2 EU/DL (ref 0.2–1)
WBC # BLD AUTO: 11 K/UL (ref 3.6–11)
WBC URNS QL MICRO: ABNORMAL /HPF (ref 0–4)

## 2017-08-07 PROCEDURE — 80053 COMPREHEN METABOLIC PANEL: CPT | Performed by: PHYSICIAN ASSISTANT

## 2017-08-07 PROCEDURE — 87186 SC STD MICRODIL/AGAR DIL: CPT | Performed by: EMERGENCY MEDICINE

## 2017-08-07 PROCEDURE — 96374 THER/PROPH/DIAG INJ IV PUSH: CPT

## 2017-08-07 PROCEDURE — 74176 CT ABD & PELVIS W/O CONTRAST: CPT

## 2017-08-07 PROCEDURE — 87077 CULTURE AEROBIC IDENTIFY: CPT | Performed by: EMERGENCY MEDICINE

## 2017-08-07 PROCEDURE — 36415 COLL VENOUS BLD VENIPUNCTURE: CPT | Performed by: PHYSICIAN ASSISTANT

## 2017-08-07 PROCEDURE — 99283 EMERGENCY DEPT VISIT LOW MDM: CPT

## 2017-08-07 PROCEDURE — 96361 HYDRATE IV INFUSION ADD-ON: CPT

## 2017-08-07 PROCEDURE — 74011250636 HC RX REV CODE- 250/636: Performed by: PHYSICIAN ASSISTANT

## 2017-08-07 PROCEDURE — 81001 URINALYSIS AUTO W/SCOPE: CPT | Performed by: EMERGENCY MEDICINE

## 2017-08-07 PROCEDURE — 85025 COMPLETE CBC W/AUTO DIFF WBC: CPT | Performed by: PHYSICIAN ASSISTANT

## 2017-08-07 PROCEDURE — 81025 URINE PREGNANCY TEST: CPT | Performed by: EMERGENCY MEDICINE

## 2017-08-07 PROCEDURE — 74011250637 HC RX REV CODE- 250/637: Performed by: PHYSICIAN ASSISTANT

## 2017-08-07 PROCEDURE — 87086 URINE CULTURE/COLONY COUNT: CPT | Performed by: EMERGENCY MEDICINE

## 2017-08-07 RX ORDER — PHENAZOPYRIDINE HYDROCHLORIDE 100 MG/1
200 TABLET, FILM COATED ORAL
Status: COMPLETED | OUTPATIENT
Start: 2017-08-07 | End: 2017-08-07

## 2017-08-07 RX ORDER — HYDROCODONE BITARTRATE AND ACETAMINOPHEN 5; 325 MG/1; MG/1
1 TABLET ORAL
Status: COMPLETED | OUTPATIENT
Start: 2017-08-07 | End: 2017-08-07

## 2017-08-07 RX ORDER — FLUTICASONE PROPIONATE AND SALMETEROL 250; 50 UG/1; UG/1
1 POWDER RESPIRATORY (INHALATION) EVERY 12 HOURS
COMMUNITY

## 2017-08-07 RX ORDER — KETOROLAC TROMETHAMINE 30 MG/ML
30 INJECTION, SOLUTION INTRAMUSCULAR; INTRAVENOUS
Status: COMPLETED | OUTPATIENT
Start: 2017-08-07 | End: 2017-08-07

## 2017-08-07 RX ADMIN — SODIUM CHLORIDE 1000 ML: 900 INJECTION, SOLUTION INTRAVENOUS at 14:15

## 2017-08-07 RX ADMIN — PHENAZOPYRIDINE HYDROCHLORIDE 200 MG: 100 TABLET ORAL at 14:16

## 2017-08-07 RX ADMIN — HYDROCODONE BITARTRATE AND ACETAMINOPHEN 1 TABLET: 5; 325 TABLET ORAL at 15:33

## 2017-08-07 RX ADMIN — KETOROLAC TROMETHAMINE 30 MG: 30 INJECTION, SOLUTION INTRAMUSCULAR at 14:17

## 2017-08-07 NOTE — DISCHARGE INSTRUCTIONS
Abdominal Pain: Care Instructions  Your Care Instructions    Abdominal pain has many possible causes. Some aren't serious and get better on their own in a few days. Others need more testing and treatment. If your pain continues or gets worse, you need to be rechecked and may need more tests to find out what is wrong. You may need surgery to correct the problem. Don't ignore new symptoms, such as fever, nausea and vomiting, urination problems, pain that gets worse, and dizziness. These may be signs of a more serious problem. Your doctor may have recommended a follow-up visit in the next 8 to 12 hours. If you are not getting better, you may need more tests or treatment. The doctor has checked you carefully, but problems can develop later. If you notice any problems or new symptoms, get medical treatment right away. Follow-up care is a key part of your treatment and safety. Be sure to make and go to all appointments, and call your doctor if you are having problems. It's also a good idea to know your test results and keep a list of the medicines you take. How can you care for yourself at home? · Rest until you feel better. · To prevent dehydration, drink plenty of fluids, enough so that your urine is light yellow or clear like water. Choose water and other caffeine-free clear liquids until you feel better. If you have kidney, heart, or liver disease and have to limit fluids, talk with your doctor before you increase the amount of fluids you drink. · If your stomach is upset, eat mild foods, such as rice, dry toast or crackers, bananas, and applesauce. Try eating several small meals instead of two or three large ones. · Wait until 48 hours after all symptoms have gone away before you have spicy foods, alcohol, and drinks that contain caffeine. · Do not eat foods that are high in fat. · Avoid anti-inflammatory medicines such as aspirin, ibuprofen (Advil, Motrin), and naproxen (Aleve).  These can cause stomach upset. Talk to your doctor if you take daily aspirin for another health problem. When should you call for help? Call 911 anytime you think you may need emergency care. For example, call if:  · You passed out (lost consciousness). · You pass maroon or very bloody stools. · You vomit blood or what looks like coffee grounds. · You have new, severe belly pain. Call your doctor now or seek immediate medical care if:  · Your pain gets worse, especially if it becomes focused in one area of your belly. · You have a new or higher fever. · Your stools are black and look like tar, or they have streaks of blood. · You have unexpected vaginal bleeding. · You have symptoms of a urinary tract infection. These may include:  ¨ Pain when you urinate. ¨ Urinating more often than usual.  ¨ Blood in your urine. · You are dizzy or lightheaded, or you feel like you may faint. Watch closely for changes in your health, and be sure to contact your doctor if:  · You are not getting better after 1 day (24 hours). Where can you learn more? Go to http://dustyNordicplanari.info/. Enter T572 in the search box to learn more about \"Abdominal Pain: Care Instructions. \"  Current as of: March 20, 2017  Content Version: 11.3  © 5573-2357 [employer]. Care instructions adapted under license by [employer] (which disclaims liability or warranty for this information). If you have questions about a medical condition or this instruction, always ask your healthcare professional. [place] [address] any warranty or liability for your use of this information. Dehydration: Care Instructions  Your Care Instructions  Dehydration happens when your body loses too much fluid. This might happen when you do not drink enough water or you lose large amounts of fluids from your body because of diarrhea, vomiting, or sweating. Severe dehydration can be life-threatening.   Water and minerals called electrolytes help put your body fluids back in balance. Learn the early signs of fluid loss, and drink more fluids to prevent dehydration. Follow-up care is a key part of your treatment and safety. Be sure to make and go to all appointments, and call your doctor if you are having problems. It's also a good idea to know your test results and keep a list of the medicines you take. How can you care for yourself at home? · To prevent dehydration, drink plenty of fluids, enough so that your urine is light yellow or clear like water. Choose water and other caffeine-free clear liquids until you feel better. If you have kidney, heart, or liver disease and have to limit fluids, talk with your doctor before you increase the amount of fluids you drink. · If you do not feel like eating or drinking, try taking small sips of water, sports drinks, or other rehydration drinks. · Get plenty of rest.  To prevent dehydration  · Add more fluids to your diet and daily routine, unless your doctor has told you not to. · During hot weather, drink more fluids. Drink even more fluids if you exercise a lot. Stay away from drinks with alcohol or caffeine. · Watch for the symptoms of dehydration. These include:  ¨ A dry, sticky mouth. ¨ Dark yellow urine, and not much of it. ¨ Dry and sunken eyes. ¨ Feeling very tired. · Learn what problems can lead to dehydration. These include:  ¨ Diarrhea, fever, and vomiting. ¨ Any illness with a fever, such as pneumonia or the flu. ¨ Activities that cause heavy sweating, such as endurance races and heavy outdoor work in hot or humid weather. ¨ Alcohol or drug abuse or withdrawal.  ¨ Certain medicines, such as cold and allergy pills (antihistamines), diet pills (diuretics), and laxatives. ¨ Certain diseases, such as diabetes, cancer, and heart or kidney disease. When should you call for help? Call 911 anytime you think you may need emergency care.  For example, call if:  · You passed out (lost consciousness). Call your doctor now or seek immediate medical care if:  · You are confused and cannot think clearly. · You are dizzy or lightheaded, or you feel like you may faint. · You have signs of needing more fluids. You have sunken eyes and a dry mouth, and you pass only a little dark urine. · You cannot keep fluids down. Watch closely for changes in your health, and be sure to contact your doctor if:  · You are not making tears. · Your skin is very dry and sags slowly back into place after you pinch it. · Your mouth and eyes are very dry. Where can you learn more? Go to http://dusty-ari.info/. Enter X217 in the search box to learn more about \"Dehydration: Care Instructions. \"  Current as of: March 20, 2017  Content Version: 11.3  © 6211-0999 [employer]. Care instructions adapted under license by [employer] (which disclaims liability or warranty for this information). If you have questions about a medical condition or this instruction, always ask your healthcare professional. [address] any warranty or liability for your use of this information.

## 2017-08-07 NOTE — ED NOTES
Patient discharged by [facility]. Patient provided with discharge instructions Rx and instructions on follow up care. Patient out of ED ambulatory in no acute distress accompanied by self.

## 2017-08-07 NOTE — LETTER
[facility] EMERGENCY DEPT 
[address] [place] [address] [address] [id number] 
[phone] Work/School Note Date: 8/7/2017 To Whom It May concern: 
 
Trina Bill was seen and treated today in the emergency room by the following provider(s): 
Attending Provider: Blu Glynn. Oren Archibald MD 
Physician Assistant: Hailey Doran. [place], [place]. Trina Bill may return to work on 08/09/2017. Sincerely, 
 
 
 
 
Hailey Doran.  [place], [place]

## 2017-08-07 NOTE — ED PROVIDER NOTES
HPI Comments: Kenneth Garduno is a 35 y.o. female with PMhx significant for HTN, asthma, DM, fibromyalgia, sleep apnea, migraines, seizures who presents ambulatory to the ED with cc of bilateral flank pain noting the the left is greater than the right x 3-4 days. She reports associated frequency and nausea. She denies use of medication to modify her symptoms. Pt states that her current symptoms feel similar to her hx of UTI and Kidney Stones. Pt notes that her last kidney stone was ~ 1 month ago. Pt specifically denies any dysuria, hematuria, fevers, chills,  or vomiting. Social history significant for: - Tobacco, + EtOH, - Illicit drug use    PCP: Ag Small MD    There are no other complaints, changes or physical findings at this time. Written by INÉS Farley, as dictated by Davis Parker PA-C. The history is provided by the patient. No  was used.         Past Medical History:   Diagnosis Date    Asthma     Diabetes ([address]     Fibromyalgia     Hypertension     Lower back injury 02/13/2017    Migraines     Nausea and vomiting 1/14/2014    Other ill-defined conditions     cerebral tumor, endometriosis, fibromyalgia, herpes    Right lower quadrant abdominal mass 1/14/2014    Seizures ([address]     Sleep apnea     intolerant to CPAP    Unspecified adverse effect of anesthesia     pt states that she has an asthma attack when coming out of  anesthesia       Past Surgical History:   Procedure Laterality Date    HX CHOLECYSTECTOMY      HX COLECTOMY  3/14/2013    Rt colectomy for perf transverse diverticulum    HX HEENT      wisdom teeth    HX OTHER SURGICAL      laproscopy on ovaries    HX OTHER SURGICAL      left wrist ganglion cystectomy    HX OTHER SURGICAL      lung mass removed         Family History:   Problem Relation Age of Onset    Other Other      unable to obtain due to AMS    Hypertension Father     Heart Disease Father     Cancer Father     Hypertension Mother     Diabetes Maternal Grandmother     Diabetes Maternal Grandfather        Social History     Social History    Marital status: LEGALLY      Spouse name: N/A    Number of children: N/A    Years of education: N/A     Occupational History    Not on file. Social History Main Topics    Smoking status: Never Smoker    Smokeless tobacco: Not on file    Alcohol use Yes      Comment: ocasionally    Drug use: No    Sexual activity: Not on file     Other Topics Concern    Not on file     Social History Narrative         ALLERGIES: Lemon; Adhesive tape-silicones; Dilaudid [hydromorphone (pf)]; Fentanyl; and Percocet [oxycodone-acetaminophen]    Review of Systems   Constitutional: Negative. Negative for activity change, appetite change, chills, diaphoresis, fever and unexpected weight change. HENT: Negative for congestion, hearing loss, rhinorrhea, sinus pressure, sneezing, sore throat and trouble swallowing. Eyes: Negative for pain, redness, itching and visual disturbance. Respiratory: Negative for cough, shortness of breath and wheezing. Cardiovascular: Negative for chest pain, palpitations and leg swelling. Gastrointestinal: Positive for nausea. Negative for abdominal pain, constipation, diarrhea and vomiting. Genitourinary: Positive for flank pain (L>R) and frequency. Negative for dysuria. Musculoskeletal: Negative for arthralgias, gait problem and myalgias. Skin: Negative for color change, pallor, rash and wound. Neurological: Negative for tremors, weakness, light-headedness, numbness and headaches. All other systems reviewed and are negative. Patient Vitals for the past 12 hrs:   Temp Pulse Resp BP SpO2   08/07/17 1313 98 °F (36.7 °C) 91 16 (!) 157/99 98 %     Physical Exam   Constitutional: She is oriented to person, place, and time. Vital signs are normal. She appears well-developed. No distress. 35 y.o.   female in NAD  Communicates appropriately and in full sentences  Elevated BMI   HENT:   Head: Normocephalic and atraumatic. Right Ear: External ear normal.   Left Ear: External ear normal.   Mouth/Throat: Oropharynx is clear and moist.   Eyes: Conjunctivae are normal. Pupils are equal, round, and reactive to light. Neck: Normal range of motion. Neck supple. Cardiovascular: Normal rate, regular rhythm, normal heart sounds and intact distal pulses. Pulmonary/Chest: Effort normal and breath sounds normal. No respiratory distress. She has no wheezes. Abdominal: Soft. Bowel sounds are normal. She exhibits no distension. There is no tenderness. Bilateral CVA tenderness L>R  Left flank pain  No abdominal tenderness to light and deep palpation. Musculoskeletal: Normal range of motion. She exhibits no edema, tenderness or deformity. No neurologic, motor, vascular, or compartment embarrassment observed on exam. No focal neurologic deficits. Neurological: She is alert and oriented to person, place, and time. No cranial nerve deficit. Coordination normal.   Skin: Skin is warm and dry. No rash noted. She is not diaphoretic. No erythema. No pallor. Psychiatric: She has a normal mood and affect. Nursing note and vitals reviewed. MDM  Number of Diagnoses or Management Options  Dehydration:   H/O: depression:   Hematuria:   Left flank pain:   Diagnosis management comments:   DDx: cystitis, pyelonephritis, nephrolithiasis, ureterolithiasis, hydronephrosis, hydroureter, poorly controlled DM. This patient presents with classic symptoms of nephrolithiasis including dysuria, hematuria, nausea, and unilateral flank pain. Will begin by assessing pt's CBC, CMP, UA with reflex culture, and renal colic CT. I will treat the patient in the meantime with an analgesic, antiemetic, fluids, and FloMax.          Amount and/or Complexity of Data Reviewed  Clinical lab tests: ordered and reviewed  Tests in the radiology section of CPT®: ordered and reviewed  Review and summarize past medical records: yes    Patient Progress  Patient progress: stable    Procedures    I reviewed our electronic medical record system for any past medical records that were available that may contribute to the patients current condition, the nursing notes and vital signs from today's visit     Nursing notes will be reviewed as they become available in realtime while the pt is in the ED. Progress Note:  1:43 PM  The patients presenting problems have been discussed, and they are in agreement with the care plan formulated and outlined with them. I have encouraged them to ask questions as they arise throughout their visit. Will continue to monitor. PROGRESS NOTE:  2:58 PM  Pt has been re-evaluated. Pt states that her pain has returned. Pt expresses concern for pregnancy. Written by INÉS Velasquez, as dictated by Mabel Walker PA-C. Progress Note:  3:07 PM  Pt states that she recently received a steroid shot in her back in a similar location where she is experiencing pain and questions whether or not this could be contributing to her pain. Educated pt that this could be the cause of her pain as CT is negative. Will continue to monitor. Progress Note:  3:41 PM  Provider re-evaluated pt. Per patient, other flank pain has improved. Provider discussed all available diagnostics, diagnosis, and treatment plan. Thoroughly discussed worrisome signs/symptoms in which pt should immediately return to ED, otherwise, urged to follow-up with urology. Patient conveys understanding and agreement to all of the above. All patient's questions were answered by provider. DISCHARGE NOTE:  3:46 PM  Catie MCWILLIAMS Marquise's  results have been reviewed with her. She has been counseled regarding her diagnosis.   She verbally conveys understanding and agreement of the signs, symptoms, diagnosis, treatment and prognosis and additionally agrees to follow up as recommended with Dr. Ian Tubbs MD in 24 - 48 hours. She also agrees with the care-plan and conveys that all of her questions have been answered. I have also put together some discharge instructions for her that include: 1) educational information regarding their diagnosis, 2) how to care for their diagnosis at home, as well a 3) list of reasons why they would want to return to the ED prior to their follow-up appointment, should their condition change. She and/or family's questions have been answered. I have encouraged them to see the official results in [facility]\" or to retrieve the specifics of their results from medical records. LABS COMPLETED AND REVIEWED:  Recent Results (from the past 12 hour(s))   CBC WITH AUTOMATED DIFF    Collection Time: 08/07/17  2:11 PM   Result Value Ref Range    WBC 11.0 3.6 - 11.0 K/uL    RBC 4.86 3.80 - 5.20 M/uL    HGB 12.4 11.5 - 16.0 g/dL    HCT 42.2 35.0 - 47.0 %    MCV 86.8 80.0 - 99.0 FL    MCH 25.5 (L) 26.0 - 34.0 PG    MCHC 29.4 (L) 30.0 - 36.5 g/dL    RDW 16.6 (H) 11.5 - 14.5 %    PLATELET 917 708 - 318 K/uL    NEUTROPHILS 80 (H) 32 - 75 %    LYMPHOCYTES 17 12 - 49 %    MONOCYTES 2 (L) 5 - 13 %    EOSINOPHILS 1 0 - 7 %    BASOPHILS 0 0 - 1 %    ABS. NEUTROPHILS 8.9 (H) 1.8 - 8.0 K/UL    ABS. LYMPHOCYTES 1.8 0.8 - 3.5 K/UL    ABS. MONOCYTES 0.2 0.0 - 1.0 K/UL    ABS. EOSINOPHILS 0.1 0.0 - 0.4 K/UL    ABS.  BASOPHILS 0.0 0.0 - 0.1 K/UL   METABOLIC PANEL, COMPREHENSIVE    Collection Time: 08/07/17  2:11 PM   Result Value Ref Range    Sodium 140 136 - 145 mmol/L    Potassium 3.7 3.5 - 5.1 mmol/L    Chloride 109 (H) 97 - 108 mmol/L    CO2 25 21 - 32 mmol/L    Anion gap 6 5 - 15 mmol/L    Glucose 98 65 - 100 mg/dL    BUN 19 6 - 20 MG/DL    Creatinine 0.74 0.55 - 1.02 MG/DL    BUN/Creatinine ratio 26 (H) 12 - 20      GFR est AA >60 >60 ml/min/1.73m2    GFR est non-AA >60 >60 ml/min/1.73m2    Calcium 8.9 8.5 - 10.1 MG/DL    Bilirubin, total 0.3 0.2 - 1.0 MG/DL    ALT (SGPT) 23 12 - 78 U/L    AST (SGOT) 16 15 - 37 U/L    Alk. phosphatase 105 45 - 117 U/L    Protein, total 7.5 6.4 - 8.2 g/dL    Albumin 3.4 (L) 3.5 - 5.0 g/dL    Globulin 4.1 (H) 2.0 - 4.0 g/dL    A-G Ratio 0.8 (L) 1.1 - 2.2     URINALYSIS W/ REFLEX CULTURE    Collection Time: 08/07/17  2:32 PM   Result Value Ref Range    Color YELLOW/STRAW      Appearance CLOUDY (A) CLEAR      Specific gravity 1.025 1.003 - 1.030      pH (UA) 6.0 5.0 - 8.0      Protein TRACE (A) NEG mg/dL    Glucose NEGATIVE  NEG mg/dL    Ketone TRACE (A) NEG mg/dL    Bilirubin NEGATIVE  NEG      Blood SMALL (A) NEG      Urobilinogen 0.2 0.2 - 1.0 EU/dL    Nitrites NEGATIVE  NEG      Leukocyte Esterase NEGATIVE  NEG      WBC 0-4 0 - 4 /hpf    RBC 10-20 0 - 5 /hpf    Epithelial cells MODERATE (A) FEW /lpf    Bacteria 1+ (A) NEG /hpf    UA:UC IF INDICATED URINE CULTURE ORDERED (A) CNI      Hyaline cast 0-2 0 - 5 /lpf   HCG URINE, QL    Collection Time: 08/07/17  2:32 PM   Result Value Ref Range    HCG urine, Ql. NEGATIVE  NEG         IMAGING COMPLETED AND REVIEWED:  CT ABD PELV WO CONT   Final Result   CT Results  (Last 48 hours)               08/07/17 1527  CT ABD PELV WO CONT Final result    Impression:  IMPRESSION:    1. No renal or ureteral calculus or other acute abdominal or pelvic abnormality. There were no stones on the prior examination 4 years ago. 2. Status post cholecystectomy. 3. Status post ileocolic resection. Narrative:  EXAM: CT ABDOMEN AND PELVIS WITHOUT CONTRAST   INDICATION:  Flank pain with recurrent stone disease suspected. COMPARISON: 6/5/2013. CONTRAST: None. TECHNIQUE:    Unenhanced multislice helical CT was performed from the diaphragm to the   symphysis pubis without intravenous contrast administration. Oral contrast was   not administered. Contiguous 5 mm axial images were reconstructed and lung and   soft tissue windows were generated. Coronal and sagittal reformations were   generated.  CT dose reduction was achieved through use of a standardized protocol   tailored for this examination and automatic exposure control for dose   modulation. FINDINGS:   The patient is morbidly obese. LOWER CHEST: The visualized portions of the lung bases are clear. The absence of intravenous contrast material reduces the sensitivity for   evaluation of the solid parenchymal organs of the abdomen. ABDOMEN:   Liver: The liver is normal in size and contour with no focal abnormality. Gallbladder and bile ducts: There are clips in the gallbladder fossa and the   gallbladder is absent. Spleen: No abnormality. Pancreas: No abnormality. Adrenal glands: No abnormality. Kidneys: No focal parenchymal abnormality. There is no renal or ureteral   calculus or obstruction. PELVIS:   Reproductive organs: The uterus is normal.     Bladder: No abnormality. RETROPERITONEUM: The aorta tapers without aneurysm. There is no retroperitoneal   adenopathy or mass. There is no pelvic mass or adenopathy. BOWEL AND MESENTERY: The small bowel is not obstructed. The patient is status   post ileocolic resection with a stapled anastomosis. The appendix is absent. PERITONEUM: There is no ascites or free intraperitoneal air. BONES AND SOFT TISSUES: The bones and soft tissues of the abdominal wall are   within normal limits. CLINICAL IMPRESSION:  1. Dehydration    2. Hematuria    3. Left flank pain    4. H/O: depression        Plan:  1. Return precautions  2. Medications as prescribed  3.  Follow-ups as discussed  Discharge Medication List as of 8/7/2017  3:46 PM        Follow-up Information     Follow up With Details Comments Efren Elmore MD Schedule an appointment as soon as possible for a visit in 2 days As needed, If symptoms worsen, Possible further evaluation and treatment [place] [phone]      [facility] EMERGENCY DEPT Go to As needed, If symptoms [address]  [address], [place] Schedule an appointment as soon as possible for a visit in 2 days As needed, If symptoms worsen, Possible further evaluation and treatment [id number]   [address]  [facility]  [phone]          Return to the closest emergency room or follow up sooner for any deterioration      This note will not be viewable in [address]

## 2017-08-07 NOTE — ED NOTES
Pt offered a browning bed, which she refused. She will be placed in a regular room when it is available.

## 2017-08-07 NOTE — ED TRIAGE NOTES
Pt. Presents to ED today for complaints of \"unbearable\" lower back pain that started 3-4 days ago. PT. Also reports urinary frequency. Pt. Alert and oriented x4. PT. Placed in position of comfort with call bell in reach.

## 2017-08-09 LAB
BACTERIA SPEC CULT: ABNORMAL
BACTERIA SPEC CULT: ABNORMAL
CC UR VC: ABNORMAL
SERVICE CMNT-IMP: ABNORMAL

## 2017-08-09 RX ORDER — CEPHALEXIN 500 MG/1
500 CAPSULE ORAL 2 TIMES DAILY
Qty: 6 CAP | Refills: 0 | Status: SHIPPED | OUTPATIENT
Start: 2017-08-09 | End: 2017-08-12

## 2017-08-09 NOTE — PROGRESS NOTES
Called and spoke with patient regarding results. Keflex called to patient's preferred pharmacy. Questions answered.   Korin Anton PA-C

## 2017-08-15 ENCOUNTER — TELEPHONE (OUTPATIENT)
Dept: NEUROLOGY | Age: 42
End: 2017-08-15

## 2017-08-15 NOTE — TELEPHONE ENCOUNTER
----- Message from Red Vogel sent at 8/15/2017 12:25 PM EDT -----  Regarding: /Telephone  Pt requesting a call from nurse or MD in reference to pain and spine  Cheri Carrasquillo p [phone] inquiring about if pt can have spine injections for a back injury. Please give a call at earliest convenience . Best contact [phone].

## 2017-08-16 NOTE — TELEPHONE ENCOUNTER
Patient wanted to know if it was okay to receive spinal injections due to the back injury, Dr. Farideh Flores would be giving her the injections

## 2017-08-21 DIAGNOSIS — F51.01 PRIMARY INSOMNIA: ICD-10-CM

## 2017-08-22 RX ORDER — QUETIAPINE FUMARATE 50 MG/1
TABLET, FILM COATED ORAL
Qty: 30 TAB | Refills: 4 | Status: SHIPPED | OUTPATIENT
Start: 2017-08-22 | End: 2018-02-27 | Stop reason: SDUPTHER

## 2017-08-24 NOTE — TELEPHONE ENCOUNTER
Dr. Merissa Webster has completed the letter and it will be faxed to Dr. Anabel Sunshine office today

## 2017-09-23 RX ORDER — ZONISAMIDE 100 MG/1
CAPSULE ORAL
Qty: 120 CAP | Refills: 3 | Status: SHIPPED | OUTPATIENT
Start: 2017-09-23 | End: 2019-03-06 | Stop reason: SDUPTHER

## 2017-09-25 DIAGNOSIS — G93.2 PSEUDOTUMOR CEREBRI: ICD-10-CM

## 2017-09-26 RX ORDER — ROSUVASTATIN CALCIUM 10 MG/1
TABLET, FILM COATED ORAL
Qty: 30 TAB | Refills: 3 | Status: SHIPPED | COMMUNITY
Start: 2017-09-26 | End: 2018-11-06

## 2017-09-26 RX ORDER — ZONISAMIDE 100 MG/1
CAPSULE ORAL
Qty: 120 CAP | Refills: 3 | Status: SHIPPED | COMMUNITY
Start: 2017-09-26 | End: 2018-04-18 | Stop reason: SDUPTHER

## 2017-10-12 DIAGNOSIS — G93.2 PSEUDOTUMOR CEREBRI: ICD-10-CM

## 2017-10-12 RX ORDER — ACETAZOLAMIDE 500 MG/1
1000 CAPSULE, EXTENDED RELEASE ORAL 2 TIMES DAILY
Qty: 60 CAP | Refills: 2 | Status: SHIPPED | OUTPATIENT
Start: 2017-10-12 | End: 2018-07-24 | Stop reason: SDUPTHER

## 2017-10-12 NOTE — TELEPHONE ENCOUNTER
No future appointments. Last Appointment My Department:  4/25/2017    Please advise of refill below. Requested Prescriptions     Pending Prescriptions Disp Refills    acetaZOLAMIDE SR (DIAMOX) 500 mg capsule 60 Cap 2     Sig: Take 2 Caps by mouth two (2) times a day.

## 2018-01-21 DIAGNOSIS — E53.8 B12 DEFICIENCY: ICD-10-CM

## 2018-01-24 RX ORDER — CYANOCOBALAMIN 1000 UG/ML
INJECTION, SOLUTION INTRAMUSCULAR; SUBCUTANEOUS
Qty: 4 ML | Refills: 3 | Status: SHIPPED | OUTPATIENT
Start: 2018-01-24 | End: 2018-02-23 | Stop reason: SDUPTHER

## 2018-02-13 ENCOUNTER — DOCUMENTATION ONLY (OUTPATIENT)
Dept: NEUROLOGY | Age: 43
End: 2018-02-13

## 2018-02-13 NOTE — PROGRESS NOTES
Last office note faxed over to [facility] eye [facility] per medical release received

## 2018-02-14 ENCOUNTER — TELEPHONE (OUTPATIENT)
Dept: NEUROLOGY | Age: 43
End: 2018-02-14

## 2018-02-14 NOTE — TELEPHONE ENCOUNTER
Patient wanted come in and see Dr. Mckenzie Ro, patient stated that her last seizure was on 02/09/18; Offered the patient an appointment to the patient for 02/20/18 at 12:00pm patient declined stated has another appointment at 11:40 for shots in her back. Offered the patient an appointment for 02/21/18 at 12:00pm and the patient stated that she will come in at that time to be seen.

## 2018-02-14 NOTE — TELEPHONE ENCOUNTER
Called the patient because the previous appointment time that was offered wasn't available;offered the patient 02/23/18 at 8:40am; patient stated that she will see if she can make that and if not she will contact the office back to reschedule the appointment

## 2018-02-23 ENCOUNTER — OFFICE VISIT (OUTPATIENT)
Dept: NEUROLOGY | Age: 43
End: 2018-02-23

## 2018-02-23 VITALS
OXYGEN SATURATION: 98 % | HEART RATE: 61 BPM | SYSTOLIC BLOOD PRESSURE: 122 MMHG | BODY MASS INDEX: 44.57 KG/M2 | DIASTOLIC BLOOD PRESSURE: 72 MMHG | HEIGHT: 60 IN | WEIGHT: 227 LBS

## 2018-02-23 DIAGNOSIS — E53.8 B12 DEFICIENCY: ICD-10-CM

## 2018-02-23 DIAGNOSIS — G93.2 PSEUDOTUMOR CEREBRI: ICD-10-CM

## 2018-02-23 DIAGNOSIS — E66.01 OBESITY, CLASS III, BMI 40-49.9 (MORBID OBESITY) (HCC): Primary | ICD-10-CM

## 2018-02-23 DIAGNOSIS — M79.7 FIBROMYALGIA: ICD-10-CM

## 2018-02-23 DIAGNOSIS — F51.01 PRIMARY INSOMNIA: ICD-10-CM

## 2018-02-23 DIAGNOSIS — R41.0 DISORIENTATION: ICD-10-CM

## 2018-02-23 RX ORDER — CYANOCOBALAMIN 1000 UG/ML
1000 INJECTION, SOLUTION INTRAMUSCULAR; SUBCUTANEOUS ONCE
Qty: 4 ML | Refills: 3
Start: 2018-02-23 | End: 2018-02-27 | Stop reason: SDUPTHER

## 2018-02-23 RX ORDER — PROMETHAZINE HYDROCHLORIDE 25 MG/1
25 TABLET ORAL
Qty: 45 TAB | Refills: 3 | Status: SHIPPED | OUTPATIENT
Start: 2018-02-23 | End: 2018-08-15 | Stop reason: SDUPTHER

## 2018-02-23 RX ORDER — FLUOXETINE HYDROCHLORIDE 20 MG/1
20 CAPSULE ORAL DAILY
Qty: 30 CAP | Refills: 11 | Status: SHIPPED | OUTPATIENT
Start: 2018-02-23 | End: 2018-02-27 | Stop reason: SDUPTHER
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
## 2018-02-23 NOTE — MR AVS SNAPSHOT
[address], 
[id number], [address] [facility]. 
[phone] Patient: Rupinder Coulter MRN:  [id number] Visit Information Date & Time Provider Department Dept. Phone Encounter #  
 2/23/2018  8:40 AM Mary Sánchez MD Neurology [facility] at [facility] [phone] [id number] Follow-up Instructions Return in about 3 months (around 5/23/2018). Upcoming Health Maintenance Date Due HEMOGLOBIN A1C Q6M 1973 LIPID PANEL Q1 1973 FOOT EXAM Q1 11/4/1983 MICROALBUMIN Q1 11/4/1983 EYE EXAM RETINAL OR DILATED Q1 11/4/1983 Pneumococcal 19-64 Medium Risk (1 of 1 - PPSV23) 11/4/1992 DTaP/Tdap/Td series (1 - Tdap) 11/4/1994 PAP AKA CERVICAL CYTOLOGY 11/4/1994 Influenza Age 5 to Adult 8/1/2017 Allergies as of 2/23/2018  Review Complete On: 2/23/2018 By: Mary Sánchez MD  
  
 Severity Noted Reaction Type Reactions Lemon High 08/07/2012    Anaphylaxis Adhesive Tape-silicones  05/39/9954    Other (comments) Pt reports it burns her skin Dilaudid [Hydromorphone (Pf)]  06/21/2010    Itching Fentanyl  06/21/2010    Shortness of Breath Percocet [Oxycodone-acetaminophen]  06/21/2010    Unknown (comments) Pt states not allergic Current Immunizations  Reviewed on 7/14/2015 Name Date Influenza Vaccine PF  Deferred (Patient Refused) Influenza Vaccine Whole 2/1/2011 Pneumococcal Polysaccharide (PPSV-23)  Deferred (Patient Refused) Not reviewed this visit You Were Diagnosed With   
  
 Codes Comments Obesity, Class III, BMI 40-49.9 (morbid obesity) ([facility]    -  Primary ICD-10-CM: E66.01 
ICD-9-CM: 278.01 Fibromyalgia     ICD-10-CM: M79.7 ICD-9-CM: 729.1 Pseudotumor cerebri     ICD-10-CM: G93.2 ICD-9-CM: 209. 2 Disorientation     ICD-10-CM: R41.0 ICD-9-CM: 780.99   
 B12 deficiency     ICD-10-CM: E53.8 ICD-9-CM: 266.2 Vitals BP Pulse Height(growth percentile) Weight(growth percentile) SpO2 BMI  
 122/72 61 5' (1.524 m) 227 lb (103 kg) 98% 44.33 kg/m2 OB Status Smoking Status Having regular periods Never Smoker Vitals History BMI and BSA Data Body Mass Index Body Surface Area 44.33 kg/m 2 2.09 m 2 Preferred Pharmacy Pharmacy Name Phone [id number] [address] [place] [address] [phone] Your Updated Medication List  
  
   
This list is accurate as of 2/23/18  9:58 AM.  Always use your most recent med list.  
  
  
  
  
 acetaZOLAMIDE  mg capsule Commonly known as:  DIAMOX Take 2 Caps by mouth two (2) times a day. acyclovir 200 mg capsule Commonly known as:  ZOVIRAX Take 200 mg by mouth two (2) times a day. ADVAIR DISKUS 250-50 mcg/dose diskus inhaler Generic drug:  fluticasone-salmeterol Take 1 Puff by inhalation every twelve (12) hours. atenolol 100 mg tablet Commonly known as:  TENORMIN Take 100 mg by mouth two (2) times a day. BENADRYL 25 mg capsule Generic drug:  diphenhydrAMINE Take 25 mg by mouth as needed. Indications: [facility]erigc [facility] - only takes with evette  
  
 cyanocobalamin 1,000 mcg/mL injection Commonly known as:  VITAMIN B12  
1 mL by IntraMUSCular route once for 1 dose. DIGITEK 0.125 mg tablet Generic drug:  digoxin  
take 1 tablet by mouth once daily FLUoxetine 20 mg capsule Commonly known as:  PROzac Take 1 Cap by mouth daily. LYRICA 100 mg capsule Generic drug:  pregabalin  
take 1 capsule by mouth twice a day **MAX DAILY AMOUNT: 200MG  
  
 methocarbamol 750 mg tablet Commonly known as:  ROBAXIN Take  by mouth four (4) times daily. ondansetron hcl 4 mg tablet Commonly known as:  ZOFRAN (AS HYDROCHLORIDE) Take 1 Tab by mouth every eight (8) hours as needed for Nausea. promethazine 25 mg tablet Commonly known as:  PHENERGAN Take 1 Tab by mouth every six (6) hours as needed for Nausea. QUEtiapine 50 mg tablet Commonly known as:  SEROquel  
take 1 tablet by mouth every evening Syringe-Needle, Safety,Disp Un 3 mL 25 gauge x 1\" Syrg Use as directed * zonisamide 100 mg capsule Commonly known as:  ZONEGRAN  
take 2 capsules by mouth twice a day * zonisamide 100 mg capsule Commonly known as:  ZONEGRAN  
take 2 capsules by mouth twice a day * Notice: This list has 2 medication(s) that are the same as other medications prescribed for you. Read the directions carefully, and ask your doctor or other care provider to review them with you. Prescriptions Sent to Pharmacy Refills  
 promethazine (PHENERGAN) 25 mg tablet 3 Sig: Take 1 Tab by mouth every six (6) hours as needed for Nausea. Class: Normal  
 Pharmacy: [address] [place] [address] Ph #: [phone] Route: Oral  
 FLUoxetine (PROZAC) 20 mg capsule 11 Sig: Take 1 Cap by mouth daily. Class: Normal  
 Pharmacy: [address] [place] [address] Ph #: [phone] Route: Oral  
 Syringe-Needle, Safety,Disp Un 3 mL 25 gauge x 1\" syrg 1 Sig: Use as directed Class: Normal  
 Pharmacy: [address] [place] [address] Ph #: [phone] Follow-up Instructions Return in about 3 months (around 5/23/2018). To-Do List   
 02/23/2018 Neurology:  EEG   
  
 02/23/2018 Imaging:  MRI BRAIN WO CONT Patient Instructions PRESCRIPTION REFILL POLICY [facility] Statement to Patients April 1, 2014 In an effort to ensure the large volume of patient prescription refills is processed in the most efficient and expeditious manner, we are asking our patients to assist us by calling your Pharmacy for all prescription refills, this will include also your  Mail Order Pharmacy. The pharmacy will contact our office electronically to continue the refill process. Please do not wait until the last minute to call your pharmacy. We need at least 48 hours (2days) to fill prescriptions. We also encourage you to call your pharmacy before going to  your prescription to make sure it is ready. With regard to controlled substance prescription refill requests (narcotic refills) that need to be picked up at our office, we ask your cooperation by providing us with at least 72 hours (3days) notice that you will need a refill. We will not refill narcotic prescription refill requests after 4:00pm on any weekday, Monday through Thursday, or after 2:00pm on Fridays, or on the weekends. We encourage everyone to explore another way of getting your prescription refill request processed using [employer], our patient web portal through our electronic medical record system. [employer] is an efficient and effective way to communicate your medication request directly to the office and  downloadable as an napoleon on your smart phone . [employer] also features a review functionality that allows you to view your medication list as well as leave messages for your physician. Are you ready to get connected? If so please review the attatched instructions or speak to any of our staff to get you set up right away! Thank you so much for your cooperation. Should you have any questions please contact our Practice Administrator. The Physicians and Staff,  [facility] Please be advised there is a $25 fee for all paperwork to be completed from our  providers. This is to be paid by the patient prior to picking up the completed forms. A Healthy Lifestyle: Care Instructions Your Care Instructions A healthy lifestyle can help you feel good, stay at a healthy weight, and have plenty of energy for both work and play. A healthy lifestyle is something you can share with your whole family. A healthy lifestyle also can lower your risk for serious health problems, such as high blood pressure, heart disease, and diabetes. You can follow a few steps listed below to improve your health and the health of your family. Follow-up care is a key part of your treatment and safety. Be sure to make and go to all appointments, and call your doctor if you are having problems. It's also a good idea to know your test results and keep a list of the medicines you take. How can you care for yourself at home? · Do not eat too much sugar, fat, or fast foods. You can still have dessert and treats now and then. The goal is moderation. · Start small to improve your eating habits. Pay attention to portion sizes, drink less juice and soda pop, and eat more fruits and vegetables. ¨ Eat a healthy amount of food. A 3-ounce serving of meat, for example, is about the size of a deck of cards. Fill the rest of your plate with vegetables and whole grains. ¨ Limit the amount of soda and sports drinks you have every day. Drink more water when you are thirsty. ¨ Eat at least 5 servings of fruits and vegetables every day. It may seem like a lot, but it is not hard to reach this goal. A serving or helping is 1 piece of fruit, 1 cup of vegetables, or 2 cups of leafy, raw vegetables. Have an apple or some carrot sticks as an afternoon snack instead of a candy bar. Try to have fruits and/or vegetables at every meal. 
· Make exercise part of your daily routine. You may want to start with simple activities, such as walking, bicycling, or slow swimming. Try to be active 30 to 60 minutes every day. You do not need to do all 30 to 60 minutes all at once.  For example, you can exercise 3 times a day for 10 or 20 minutes. Moderate exercise is safe for most people, but it is always a good idea to talk to your doctor before starting an exercise program. 
· Keep moving. Radha Patton the lawn, work in the garden, or [employer]. Take the stairs instead of the elevator at work. · If you smoke, quit. People who smoke have an increased risk for heart attack, stroke, cancer, and other lung illnesses. Quitting is hard, but there are ways to boost your chance of quitting tobacco for good. ¨ Use nicotine gum, patches, or lozenges. ¨ Ask your doctor about stop-smoking programs and medicines. ¨ Keep trying. In addition to reducing your risk of diseases in the future, you will notice some benefits soon after you stop using tobacco. If you have shortness of breath or asthma symptoms, they will likely get better within a few weeks after you quit. · Limit how much alcohol you drink. Moderate amounts of alcohol (up to 2 drinks a day for men, 1 drink a day for women) are okay. But drinking too much can lead to liver problems, high blood pressure, and other health problems. Family health If you have a family, there are many things you can do together to improve your health. · Eat meals together as a family as often as possible. · Eat healthy foods. This includes fruits, vegetables, lean meats and dairy, and whole grains. · Include your family in your fitness plan. Most people think of activities such as jogging or tennis as the way to fitness, but there are many ways you and your family can be more active. Anything that makes you breathe hard and gets your heart pumping is exercise. Here are some tips: 
¨ Walk to do errands or to take your child to school or the bus. ¨ Go for a family bike ride after dinner instead of watching TV. Where can you learn more? Go to http://dusty-ari.info/. Enter K739 in the search box to learn more about \"A Healthy Lifestyle: Care Instructions. \" Current as of: May 12, 2017 Content Version: 11.4 © 6382-5736 Healthwise, [employer]). Care instructions adapted under license by [employer] (which disclaims liability or warranty for this information). If you have questions about a medical condition or this instruction, always ask your healthcare professional. [address] any warranty or liability for your use of this information. Introducing [facility] [place] [facility]! [facility] introduces [employer] patient portal. Now you can access parts of your medical record, email your doctor's office, and request medication refills online. 1. In your internet browser, go to [email or web address] 2. Click on the First Time User? Click Here link in the Sign In box. You will see the New Member Sign Up page. 3. Enter your [employer] Access Code exactly as it appears below. You will not need to use this code after youve completed the sign-up process. If you do not sign up before the expiration date, you must request a new code. · [employer] Access Code: [email or web address] Expires: 5/24/2018  8:28 AM 
 
4. Enter the last four digits of your Social Security Number (xxxx) and Date of Birth (mm/dd/yyyy) as indicated and click Submit. You will be taken to the next sign-up page. 5. Create a [employer] ID. This will be your [employer] login ID and cannot be changed, so think of one that is secure and easy to remember. 6. Create a [employer] password. You can change your password at any time. 7. Enter your Password Reset Question and Answer. This can be used at a later time if you forget your password. 8. Enter your e-mail address. You will receive e-mail notification when new information is available in [address] 9. Click Sign Up. You can now view and download portions of your medical record. 10. Click the Download Summary menu link to download a portable copy of your medical information. If you have questions, please visit the Frequently Asked Questions section of the [employer] website. Remember, [employer] is NOT to be used for urgent needs. For medical emergencies, dial 911. Now available from your iPhone and Android! Please provide this summary of care documentation to your next provider. Your primary care clinician is listed as Christine Vargas. If you have any questions after today's visit, please call [phone].

## 2018-02-23 NOTE — TELEPHONE ENCOUNTER
The rx fluoxetine that you gave her today for 20mg , when she got home she found out that it should be for 40mg. Also needs to refill seroquel and trazadone.

## 2018-02-23 NOTE — PATIENT INSTRUCTIONS
[facility]   Statement to Patients  April 1, 2014      In an effort to ensure the large volume of patient prescription refills is processed in the most efficient and expeditious manner, we are asking our patients to assist us by calling your Pharmacy for all prescription refills, this will include also your  Mail Order Pharmacy. The pharmacy will contact our office electronically to continue the refill process. Please do not wait until the last minute to call your pharmacy. We need at least 48 hours (2days) to fill prescriptions. We also encourage you to call your pharmacy before going to  your prescription to make sure it is ready. With regard to controlled substance prescription refill requests (narcotic refills) that need to be picked up at our office, we ask your cooperation by providing us with at least 72 hours (3days) notice that you will need a refill. We will not refill narcotic prescription refill requests after 4:00pm on any weekday, Monday through Thursday, or after 2:00pm on Fridays, or on the weekends. We encourage everyone to explore another way of getting your prescription refill request processed using [employer], our patient web portal through our electronic medical record system. [employer] is an efficient and effective way to communicate your medication request directly to the office and  downloadable as an napoleon on your smart phone . [employer] also features a review functionality that allows you to view your medication list as well as leave messages for your physician. Are you ready to get connected? If so please review the attatched instructions or speak to any of our staff to get you set up right away! Thank you so much for your cooperation. Should you have any questions please contact our Practice Administrator.     The Physicians and Staff,  [facility]     Please be advised there is a $25 fee for all paperwork to be completed from our  providers. This is to be paid by the patient prior to picking up the completed forms. A Healthy Lifestyle: Care Instructions  Your Care Instructions    A healthy lifestyle can help you feel good, stay at a healthy weight, and have plenty of energy for both work and play. A healthy lifestyle is something you can share with your whole family. A healthy lifestyle also can lower your risk for serious health problems, such as high blood pressure, heart disease, and diabetes. You can follow a few steps listed below to improve your health and the health of your family. Follow-up care is a key part of your treatment and safety. Be sure to make and go to all appointments, and call your doctor if you are having problems. It's also a good idea to know your test results and keep a list of the medicines you take. How can you care for yourself at home? · Do not eat too much sugar, fat, or fast foods. You can still have dessert and treats now and then. The goal is moderation. · Start small to improve your eating habits. Pay attention to portion sizes, drink less juice and soda pop, and eat more fruits and vegetables. ¨ Eat a healthy amount of food. A 3-ounce serving of meat, for example, is about the size of a deck of cards. Fill the rest of your plate with vegetables and whole grains. ¨ Limit the amount of soda and sports drinks you have every day. Drink more water when you are thirsty. ¨ Eat at least 5 servings of fruits and vegetables every day. It may seem like a lot, but it is not hard to reach this goal. A serving or helping is 1 piece of fruit, 1 cup of vegetables, or 2 cups of leafy, raw vegetables. Have an apple or some carrot sticks as an afternoon snack instead of a candy bar. Try to have fruits and/or vegetables at every meal.  · Make exercise part of your daily routine. You may want to start with simple activities, such as walking, bicycling, or slow swimming.  Try to be active 30 to 60 minutes every day. You do not need to do all 30 to 60 minutes all at once. For example, you can exercise 3 times a day for 10 or 20 minutes. Moderate exercise is safe for most people, but it is always a good idea to talk to your doctor before starting an exercise program.  · Keep moving. Neel  the lawn, work in the garden, or [employer]. Take the stairs instead of the elevator at work. · If you smoke, quit. People who smoke have an increased risk for heart attack, stroke, cancer, and other lung illnesses. Quitting is hard, but there are ways to boost your chance of quitting tobacco for good. ¨ Use nicotine gum, patches, or lozenges. ¨ Ask your doctor about stop-smoking programs and medicines. ¨ Keep trying. In addition to reducing your risk of diseases in the future, you will notice some benefits soon after you stop using tobacco. If you have shortness of breath or asthma symptoms, they will likely get better within a few weeks after you quit. · Limit how much alcohol you drink. Moderate amounts of alcohol (up to 2 drinks a day for men, 1 drink a day for women) are okay. But drinking too much can lead to liver problems, high blood pressure, and other health problems. Family health  If you have a family, there are many things you can do together to improve your health. · Eat meals together as a family as often as possible. · Eat healthy foods. This includes fruits, vegetables, lean meats and dairy, and whole grains. · Include your family in your fitness plan. Most people think of activities such as jogging or tennis as the way to fitness, but there are many ways you and your family can be more active. Anything that makes you breathe hard and gets your heart pumping is exercise. Here are some tips:  ¨ Walk to do errands or to take your child to school or the bus. ¨ Go for a family bike ride after dinner instead of watching TV. Where can you learn more?   Go to http://dusty-ari.info/. Enter H336 in the search box to learn more about \"A Healthy Lifestyle: Care Instructions. \"  Current as of: May 12, 2017  Content Version: 11.4  © 2855-2891 [employer], [employer]. Care instructions adapted under license by [employer] (which disclaims liability or warranty for this information). If you have questions about a medical condition or this instruction, always ask your healthcare professional. [place] [address] any warranty or liability for your use of this information.

## 2018-02-26 NOTE — TELEPHONE ENCOUNTER
Patient stated that the script for fluoxetine that was given to her at her appointment was 20 mg however when she got home she noticed that the dosage she was taken is 40 mg. Patient wanted to know if a script for the 40 mg can be sent in. Also patient stated that she needed a refill on the seroquel     Requested Prescriptions     Pending Prescriptions Disp Refills    FLUoxetine (PROZAC) 40 mg capsule 30 Cap 11     Sig: Take one cap by mouth daily    QUEtiapine (SEROQUEL) 50 mg tablet 30 Tab 4    cyanocobalamin (VITAMIN B12) 1,000 mcg/mL injection 4 mL 3     Si mL by IntraMUSCular route once for 1 dose.

## 2018-02-28 RX ORDER — FLUOXETINE HYDROCHLORIDE 40 MG/1
CAPSULE ORAL
Qty: 30 CAP | Refills: 11 | Status: SHIPPED | OUTPATIENT
Start: 2018-02-28 | End: 2018-04-18 | Stop reason: SDUPTHER

## 2018-02-28 RX ORDER — QUETIAPINE FUMARATE 50 MG/1
50 TABLET, FILM COATED ORAL
Qty: 30 TAB | Refills: 4 | Status: SHIPPED | OUTPATIENT
Start: 2018-02-28 | End: 2018-04-18 | Stop reason: SDUPTHER

## 2018-02-28 RX ORDER — CYANOCOBALAMIN 1000 UG/ML
1000 INJECTION, SOLUTION INTRAMUSCULAR; SUBCUTANEOUS ONCE
Qty: 4 ML | Refills: 3
Start: 2018-02-28 | End: 2018-02-28

## 2018-03-01 ENCOUNTER — TELEPHONE (OUTPATIENT)
Dept: NEUROLOGY | Age: 43
End: 2018-03-01

## 2018-03-01 DIAGNOSIS — G93.2 PSEUDOTUMOR CEREBRI: Primary | ICD-10-CM

## 2018-03-01 NOTE — TELEPHONE ENCOUNTER
pt said she is scheduled to have an MRI at [facility]  next week, and would like to know if Dr Marilee Rivera can switch her order to an CT Scan instead of an MRI, she was told she would have to take out all of her piercing  in order to have an MRI, and she does not want to take them out if possible.        If no other alternative, she will be prepared to do so, but will like to see if their are other options first.

## 2018-03-01 NOTE — TELEPHONE ENCOUNTER
----- Message from Daniel Mayur sent at 3/1/2018 11:40 AM EST -----  Regarding: Dr Lester Allen  Pt (p) [phone], pt said she is scheduled to have an MRI at [facility]  next week, and would like to know if Dr Lila Choi can switch her order to an CT Scan instead of an MRI, she was told she would have to take out all of her piercing  in order to have an MRI, and she does not want to take them out if possible.       If no other alternative, she will be prepared to do so, but will like to see if their are other options first.

## 2018-03-05 NOTE — TELEPHONE ENCOUNTER
Advised to the patient that a CT has been ordered, should be receiving a call from the Patient care team to have that testing scheduled

## 2018-03-08 ENCOUNTER — TELEPHONE (OUTPATIENT)
Dept: NEUROLOGY | Age: 43
End: 2018-03-08

## 2018-03-11 ENCOUNTER — APPOINTMENT (OUTPATIENT)
Dept: CT IMAGING | Age: 43
End: 2018-03-11
Payer: COMMERCIAL

## 2018-03-11 ENCOUNTER — HOSPITAL ENCOUNTER (EMERGENCY)
Age: 43
Discharge: HOME | End: 2018-03-11
Attending: EMERGENCY MEDICINE
Payer: COMMERCIAL

## 2018-03-11 VITALS
BODY MASS INDEX: 44.41 KG/M2 | HEIGHT: 60 IN | OXYGEN SATURATION: 100 % | TEMPERATURE: 97.7 F | SYSTOLIC BLOOD PRESSURE: 131 MMHG | HEART RATE: 80 BPM | RESPIRATION RATE: 16 BRPM | DIASTOLIC BLOOD PRESSURE: 73 MMHG | WEIGHT: 226.19 LBS

## 2018-03-11 DIAGNOSIS — R10.9 LEFT FLANK PAIN: Primary | ICD-10-CM

## 2018-03-11 DIAGNOSIS — K30 INDIGESTION: ICD-10-CM

## 2018-03-11 DIAGNOSIS — Z87.442 HISTORY OF KIDNEY STONES: ICD-10-CM

## 2018-03-11 DIAGNOSIS — R51.9 ACUTE INTRACTABLE HEADACHE, UNSPECIFIED HEADACHE TYPE: ICD-10-CM

## 2018-03-11 LAB
ALBUMIN SERPL-MCNC: 3.3 G/DL (ref 3.5–5)
ALBUMIN/GLOB SERPL: 0.8 {RATIO} (ref 1.1–2.2)
ALP SERPL-CCNC: 110 U/L (ref 45–117)
ALT SERPL-CCNC: 31 U/L (ref 12–78)
ANION GAP SERPL CALC-SCNC: 7 MMOL/L (ref 5–15)
APPEARANCE UR: CLEAR
AST SERPL-CCNC: 12 U/L (ref 15–37)
BACTERIA URNS QL MICRO: NEGATIVE /HPF
BASOPHILS # BLD: 0 K/UL (ref 0–0.1)
BASOPHILS NFR BLD: 0 % (ref 0–1)
BILIRUB SERPL-MCNC: 0.3 MG/DL (ref 0.2–1)
BILIRUB UR QL: NEGATIVE
BUN SERPL-MCNC: 14 MG/DL (ref 6–20)
BUN/CREAT SERPL: 15 (ref 12–20)
CALCIUM SERPL-MCNC: 8.8 MG/DL (ref 8.5–10.1)
CHLORIDE SERPL-SCNC: 114 MMOL/L (ref 97–108)
CK SERPL-CCNC: 78 U/L (ref 26–192)
CO2 SERPL-SCNC: 23 MMOL/L (ref 21–32)
COLOR UR: ABNORMAL
CREAT SERPL-MCNC: 0.93 MG/DL (ref 0.55–1.02)
DIFFERENTIAL METHOD BLD: ABNORMAL
EOSINOPHIL # BLD: 0.2 K/UL (ref 0–0.4)
EOSINOPHIL NFR BLD: 2 % (ref 0–7)
EPITH CASTS URNS QL MICRO: ABNORMAL /LPF
ERYTHROCYTE [DISTWIDTH] IN BLOOD BY AUTOMATED COUNT: 16.9 % (ref 11.5–14.5)
GLOBULIN SER CALC-MCNC: 3.9 G/DL (ref 2–4)
GLUCOSE SERPL-MCNC: 107 MG/DL (ref 65–100)
GLUCOSE UR STRIP.AUTO-MCNC: NEGATIVE MG/DL
HCG UR QL: NEGATIVE
HCT VFR BLD AUTO: 42.9 % (ref 35–47)
HGB BLD-MCNC: 13.1 G/DL (ref 11.5–16)
HGB UR QL STRIP: ABNORMAL
HYALINE CASTS URNS QL MICRO: ABNORMAL /LPF (ref 0–5)
IMM GRANULOCYTES # BLD: 0 K/UL (ref 0–0.04)
IMM GRANULOCYTES NFR BLD AUTO: 0 % (ref 0–0.5)
KETONES UR QL STRIP.AUTO: NEGATIVE MG/DL
LEUKOCYTE ESTERASE UR QL STRIP.AUTO: NEGATIVE
LYMPHOCYTES # BLD: 2 K/UL (ref 0.8–3.5)
LYMPHOCYTES NFR BLD: 19 % (ref 12–49)
MCH RBC QN AUTO: 27.6 PG (ref 26–34)
MCHC RBC AUTO-ENTMCNC: 30.5 G/DL (ref 30–36.5)
MCV RBC AUTO: 90.3 FL (ref 80–99)
MONOCYTES # BLD: 0.7 K/UL (ref 0–1)
MONOCYTES NFR BLD: 7 % (ref 5–13)
NEUTS SEG # BLD: 7.3 K/UL (ref 1.8–8)
NEUTS SEG NFR BLD: 71 % (ref 32–75)
NITRITE UR QL STRIP.AUTO: NEGATIVE
NRBC # BLD: 0 K/UL (ref 0–0.01)
NRBC BLD-RTO: 0 PER 100 WBC
PH UR STRIP: 5.5 [PH] (ref 5–8)
PLATELET # BLD AUTO: 255 K/UL (ref 150–400)
PMV BLD AUTO: 11.8 FL (ref 8.9–12.9)
POTASSIUM SERPL-SCNC: 3.9 MMOL/L (ref 3.5–5.1)
PROT SERPL-MCNC: 7.2 G/DL (ref 6.4–8.2)
PROT UR STRIP-MCNC: NEGATIVE MG/DL
RBC # BLD AUTO: 4.75 M/UL (ref 3.8–5.2)
RBC #/AREA URNS HPF: ABNORMAL /HPF (ref 0–5)
SODIUM SERPL-SCNC: 144 MMOL/L (ref 136–145)
SP GR UR REFRACTOMETRY: 1.02 (ref 1–1.03)
TROPONIN I SERPL-MCNC: <0.04 NG/ML
UA: UC IF INDICATED,UAUC: ABNORMAL
UROBILINOGEN UR QL STRIP.AUTO: 0.2 EU/DL (ref 0.2–1)
WBC # BLD AUTO: 10.2 K/UL (ref 3.6–11)
WBC URNS QL MICRO: ABNORMAL /HPF (ref 0–4)

## 2018-03-11 PROCEDURE — 74011250636 HC RX REV CODE- 250/636: Performed by: PHYSICIAN ASSISTANT

## 2018-03-11 PROCEDURE — 96361 HYDRATE IV INFUSION ADD-ON: CPT

## 2018-03-11 PROCEDURE — 74176 CT ABD & PELVIS W/O CONTRAST: CPT

## 2018-03-11 PROCEDURE — 96375 TX/PRO/DX INJ NEW DRUG ADDON: CPT

## 2018-03-11 PROCEDURE — 96374 THER/PROPH/DIAG INJ IV PUSH: CPT

## 2018-03-11 PROCEDURE — 81001 URINALYSIS AUTO W/SCOPE: CPT | Performed by: EMERGENCY MEDICINE

## 2018-03-11 PROCEDURE — 74011250637 HC RX REV CODE- 250/637: Performed by: PHYSICIAN ASSISTANT

## 2018-03-11 PROCEDURE — 36415 COLL VENOUS BLD VENIPUNCTURE: CPT | Performed by: PHYSICIAN ASSISTANT

## 2018-03-11 PROCEDURE — 96376 TX/PRO/DX INJ SAME DRUG ADON: CPT

## 2018-03-11 PROCEDURE — 80053 COMPREHEN METABOLIC PANEL: CPT | Performed by: PHYSICIAN ASSISTANT

## 2018-03-11 PROCEDURE — 85025 COMPLETE CBC W/AUTO DIFF WBC: CPT | Performed by: PHYSICIAN ASSISTANT

## 2018-03-11 PROCEDURE — 84484 ASSAY OF TROPONIN QUANT: CPT | Performed by: PHYSICIAN ASSISTANT

## 2018-03-11 PROCEDURE — 74011000250 HC RX REV CODE- 250: Performed by: PHYSICIAN ASSISTANT

## 2018-03-11 PROCEDURE — 93005 ELECTROCARDIOGRAM TRACING: CPT

## 2018-03-11 PROCEDURE — 82550 ASSAY OF CK (CPK): CPT | Performed by: PHYSICIAN ASSISTANT

## 2018-03-11 PROCEDURE — 99284 EMERGENCY DEPT VISIT MOD MDM: CPT

## 2018-03-11 PROCEDURE — 81025 URINE PREGNANCY TEST: CPT | Performed by: EMERGENCY MEDICINE

## 2018-03-11 RX ORDER — ONDANSETRON 2 MG/ML
4 INJECTION INTRAMUSCULAR; INTRAVENOUS
Status: COMPLETED | OUTPATIENT
Start: 2018-03-11 | End: 2018-03-11

## 2018-03-11 RX ORDER — SODIUM CHLORIDE 0.9 % (FLUSH) 0.9 %
5-10 SYRINGE (ML) INJECTION AS NEEDED
Status: DISCONTINUED | OUTPATIENT
Start: 2018-03-11 | End: 2018-03-11 | Stop reason: HOSPADM

## 2018-03-11 RX ORDER — SODIUM CHLORIDE 0.9 % (FLUSH) 0.9 %
5-10 SYRINGE (ML) INJECTION EVERY 8 HOURS
Status: DISCONTINUED | OUTPATIENT
Start: 2018-03-11 | End: 2018-03-11 | Stop reason: HOSPADM

## 2018-03-11 RX ORDER — HYDROCODONE BITARTRATE AND ACETAMINOPHEN 5; 325 MG/1; MG/1
1 TABLET ORAL
Qty: 15 TAB | Refills: 0 | Status: SHIPPED | OUTPATIENT
Start: 2018-03-11 | End: 2018-04-18 | Stop reason: ALTCHOICE

## 2018-03-11 RX ORDER — ONDANSETRON 4 MG/1
4 TABLET, ORALLY DISINTEGRATING ORAL
Status: DISCONTINUED | OUTPATIENT
Start: 2018-03-11 | End: 2018-03-11

## 2018-03-11 RX ORDER — FAMOTIDINE 40 MG/1
20 TABLET, FILM COATED ORAL
Qty: 14 TAB | Refills: 0 | Status: SHIPPED | OUTPATIENT
Start: 2018-03-11 | End: 2018-03-25

## 2018-03-11 RX ORDER — MORPHINE SULFATE 2 MG/ML
2 INJECTION, SOLUTION INTRAMUSCULAR; INTRAVENOUS
Status: COMPLETED | OUTPATIENT
Start: 2018-03-11 | End: 2018-03-11

## 2018-03-11 RX ORDER — DEXAMETHASONE SODIUM PHOSPHATE 4 MG/ML
10 INJECTION, SOLUTION INTRA-ARTICULAR; INTRALESIONAL; INTRAMUSCULAR; INTRAVENOUS; SOFT TISSUE
Status: COMPLETED | OUTPATIENT
Start: 2018-03-11 | End: 2018-03-11

## 2018-03-11 RX ORDER — MORPHINE SULFATE 2 MG/ML
4 INJECTION, SOLUTION INTRAMUSCULAR; INTRAVENOUS
Status: COMPLETED | OUTPATIENT
Start: 2018-03-11 | End: 2018-03-11

## 2018-03-11 RX ADMIN — LIDOCAINE HYDROCHLORIDE 40 ML: 20 SOLUTION ORAL; TOPICAL at 17:17

## 2018-03-11 RX ADMIN — MORPHINE SULFATE 2 MG: 2 INJECTION, SOLUTION INTRAMUSCULAR; INTRAVENOUS at 15:52

## 2018-03-11 RX ADMIN — MORPHINE SULFATE 4 MG: 2 INJECTION, SOLUTION INTRAMUSCULAR; INTRAVENOUS at 17:18

## 2018-03-11 RX ADMIN — ONDANSETRON 4 MG: 2 INJECTION INTRAMUSCULAR; INTRAVENOUS at 15:52

## 2018-03-11 RX ADMIN — DEXAMETHASONE SODIUM PHOSPHATE 10 MG: 4 INJECTION, SOLUTION INTRAMUSCULAR; INTRAVENOUS at 17:17

## 2018-03-11 RX ADMIN — SODIUM CHLORIDE 1000 ML: 900 INJECTION, SOLUTION INTRAVENOUS at 15:52

## 2018-03-11 NOTE — ED NOTES
Discharge instructions reviewed with patient. Discharge instructions given to patient per Bogdan Colindres PA-C. Patient able to return/verbalize discharge instructions. Copy of discharge instructions provided. Patient condition stable, respiratory status within normal limits, neuro status intact. Ambulatory out of ER, accompanied by family.

## 2018-03-11 NOTE — DISCHARGE INSTRUCTIONS
Flank Pain: Care Instructions  Your Care Instructions  Flank pain is pain on the side of the back just below the rib cage and above the waist. It can be on one or both sides. Flank pain has many possible causes, including a kidney stone, a urinary tract infection, or back strain. Flank pain may get better on its own. But don't ignore new symptoms, such as fever, nausea and vomiting, urination problems, pain that gets worse, and dizziness. These may be signs of a more serious problem. You may have to have tests or other treatment. Follow-up care is a key part of your treatment and safety. Be sure to make and go to all appointments, and call your doctor if you are having problems. It's also a good idea to know your test results and keep a list of the medicines you take. How can you care for yourself at home? · Rest until you feel better. · Take pain medicines exactly as directed. ¨ If the doctor gave you a prescription medicine for pain, take it as prescribed. ¨ If you are not taking a prescription pain medicine, ask your doctor if you can take an over-the-counter pain medicine, such as acetaminophen (Tylenol), ibuprofen (Advil, Motrin), or naproxen (Aleve). Read and follow all instructions on the label. · If your doctor prescribed antibiotics, take them as directed. Do not stop taking them just because you feel better. You need to take the full course of antibiotics. · To apply heat, put a warm water bottle, a heating pad set on low, or a warm cloth on the painful area. Do not go to sleep with a heating pad on your skin. · To prevent dehydration, drink plenty of fluids, enough so that your urine is light yellow or clear like water. Choose water and other caffeine-free clear liquids until you feel better. If you have kidney, heart, or liver disease and have to limit fluids, talk with your doctor before you increase the amount of fluids you drink. When should you call for help?   Call your doctor now or seek immediate medical care if:  ? · Your flank pain gets worse. ? · You have new symptoms, such as fever, nausea, or vomiting. ? · You have symptoms of a urinary problem. For example:  ¨ You have blood or pus in your urine. ¨ You have chills or body aches. ¨ It hurts to urinate. ¨ You have groin or belly pain. ? Watch closely for changes in your health, and be sure to contact your doctor if you do not get better as expected. Where can you learn more? Go to http://dusty-ari.info/. Enter S191 in the search box to learn more about \"Flank Pain: Care Instructions. \"  Current as of: March 20, 2017  Content Version: 11.4  © 1790-4639 [employer]. Care instructions adapted under license by [employer] (which disclaims liability or warranty for this information). If you have questions about a medical condition or this instruction, always ask your healthcare professional. [place] [address] any warranty or liability for your use of this information. Headache: Care Instructions  Your Care Instructions    Headaches have many possible causes. Most headaches aren't a sign of a more serious problem, and they will get better on their own. Home treatment may help you feel better faster. The doctor has checked you carefully, but problems can develop later. If you notice any problems or new symptoms, get medical treatment right away. Follow-up care is a key part of your treatment and safety. Be sure to make and go to all appointments, and call your doctor if you are having problems. It's also a good idea to know your test results and keep a list of the medicines you take. How can you care for yourself at home? · Do not drive if you have taken a prescription pain medicine. · Rest in a quiet, dark room until your headache is gone. Close your eyes and try to relax or go to sleep. Don't watch TV or read.   · Put a cold, moist cloth or cold pack on the painful area for 10 to 20 minutes at a time. Put a thin cloth between the cold pack and your skin. · Use a warm, moist towel or a heating pad set on low to relax tight shoulder and neck muscles. · Have someone gently massage your neck and shoulders. · Take pain medicines exactly as directed. ¨ If the doctor gave you a prescription medicine for pain, take it as prescribed. ¨ If you are not taking a prescription pain medicine, ask your doctor if you can take an over-the-counter medicine. · Be careful not to take pain medicine more often than the instructions allow, because you may get worse or more frequent headaches when the medicine wears off. · Do not ignore new symptoms that occur with a headache, such as a fever, weakness or numbness, vision changes, or confusion. These may be signs of a more serious problem. To prevent headaches  · Keep a headache diary so you can figure out what triggers your headaches. Avoiding triggers may help you prevent headaches. Record when each headache began, how long it lasted, and what the pain was like (throbbing, aching, stabbing, or dull). Write down any other symptoms you had with the headache, such as nausea, flashing lights or dark spots, or sensitivity to bright light or loud noise. Note if the headache occurred near your period. List anything that might have triggered the headache, such as certain foods (chocolate, cheese, wine) or odors, smoke, bright light, stress, or lack of sleep. · Find healthy ways to deal with stress. Headaches are most common during or right after stressful times. Take time to relax before and after you do something that has caused a headache in the past.  · Try to keep your muscles relaxed by keeping good posture. Check your jaw, face, neck, and shoulder muscles for tension, and try relaxing them. When sitting at a desk, change positions often, and stretch for 30 seconds each hour. · Get plenty of sleep and exercise.   · Eat regularly and well. Long periods without food can trigger a headache. · Treat yourself to a massage. Some people find that regular massages are very helpful in relieving tension. · Limit caffeine by not drinking too much coffee, tea, or soda. But don't quit caffeine suddenly, because that can also give you headaches. · Reduce eyestrain from computers by blinking frequently and looking away from the computer screen every so often. Make sure you have proper eyewear and that your monitor is set up properly, about an arm's length away. · Seek help if you have depression or anxiety. Your headaches may be linked to these conditions. Treatment can both prevent headaches and help with symptoms of anxiety or depression. When should you call for help? Call 911 anytime you think you may need emergency care. For example, call if:  ? · You have signs of a stroke. These may include:  ¨ Sudden numbness, paralysis, or weakness in your face, arm, or leg, especially on only one side of your body. ¨ Sudden vision changes. ¨ Sudden trouble speaking. ¨ Sudden confusion or trouble understanding simple statements. ¨ Sudden problems with walking or balance. ¨ A sudden, severe headache that is different from past headaches. ?Call your doctor now or seek immediate medical care if:  ? · You have a new or worse headache. ? · Your headache gets much worse. Where can you learn more? Go to http://dusty-ari.info/. Enter M271 in the search box to learn more about \"Headache: Care Instructions. \"  Current as of: October 14, 2016  Content Version: 11.4  © 2761-2721 [employer]. Care instructions adapted under license by [employer] (which disclaims liability or warranty for this information). If you have questions about a medical condition or this instruction, always ask your healthcare professional. [place] [address] any warranty or liability for your use of this information.

## 2018-03-11 NOTE — Clinical Note
Follow up with primary care/Urologist for flank pain. Follow up with Neurology for headaches. Return to the Emergency Dept for any concerns.

## 2018-03-11 NOTE — LETTER
[address] 
[facility] EMERGENCY DEPT 
[address] [id number] [phone] Work/School Note Date: 3/11/2018 To Whom It May concern: 
 
Darin Osuna was seen and treated today in the emergency room. She may return to work in 1 to 2 days, as symptoms improve. Sincerely, Fletcher Rasmussen, [address]

## 2018-03-11 NOTE — ED NOTES
Assumed care of patient. Patient presents with chief complaint of migraine and left flank pain. Patient states she has been experiencing a migraine for about a week. Patient has history of cerebral tumor, stating she experiences migraines frequently. Patient reports back pain has been present for about 3 days. Pain is localized to left flank area and is accompanied by hematuria. Patient denies nausea, vomiting, chest pain, or SOB. Patient is alert and oriented x4, respirations even and unlabored, VSS. Call bell within reach.

## 2018-03-11 NOTE — ED NOTES
RN called into room with patient stating she was experiencing chest discomfort. Patient's HR normal, no SOB. Patient belching frequently, stating it may be indigestion. PA aware. Orders placed. Patient to be taken to CT, EKG to be performed when patient returns.

## 2018-03-11 NOTE — ED PROVIDER NOTES
EMERGENCY DEPARTMENT HISTORY AND PHYSICAL EXAM      Date: 3/11/2018  Patient Name: Cy Hogan    History of Presenting Illness     Chief Complaint   Patient presents with    Headache     pt reports headache for one week, states she has taken all her migraine medicine without relief    Back Pain     pt reports lower back pain and states she thinks she has a kidney stone for two days       History Provided By: Patient    HPI: Cy Hogan, 38 y.o. female with PMHx significant for asthma, HTN, DM, cerebral tumor, presents ambulatory to the ED for evaluation of a headache for the past week and left lower back pain for 3 days. She denies any sinus pressure or head injury. She states her most recent kidney stone was 3 months ago. She reports associated appetite changes, hematuria, and nausea, but denies any fever, vomiting, chest pain, shortness of breath, or any other complaints. PCP: Liz Wilson MD    There are no other complaints, changes, or physical findings at this time. Current Facility-Administered Medications   Medication Dose Route Frequency Provider Last Rate Last Dose    sodium chloride (NS) flush 5-10 mL  5-10 mL IntraVENous [address], [address]        sodium chloride (NS) flush 5-10 mL  5-10 mL IntraVENous PRN [address], [address]        ondansetron (ZOFRAN ODT) tablet 4 mg  4 mg Oral NOW [address], [address]         Current Outpatient Prescriptions   Medication Sig Dispense Refill    HYDROcodone-acetaminophen (NORCO) 5-325 mg per tablet Take 1 Tab by mouth every six (6) hours as needed for Pain for up to 15 doses. Max Daily Amount: 4 Tabs. 15 Tab 0    famotidine (PEPCID) 40 mg tablet Take 0.5 Tabs by mouth nightly for 14 days. 14 Tab 0    FLUoxetine (PROZAC) 40 mg capsule Take one cap by mouth daily 30 Cap 11    QUEtiapine (SEROQUEL) 50 mg tablet Take 1 Tab by mouth nightly.  30 Tab 4    promethazine (PHENERGAN) 25 mg tablet Take 1 Tab by mouth every six (6) hours as needed for Nausea. 45 Tab 3    Syringe-Needle, Safety,Disp Un 3 mL 25 gauge x 1\" syrg Use as directed 10 Pen Needle 1    acetaZOLAMIDE SR (DIAMOX) 500 mg capsule Take 2 Caps by mouth two (2) times a day. 60 Cap 2    zonisamide (ZONEGRAN) 100 mg capsule take 2 capsules by mouth twice a day 120 Cap 3    DIGITEK 125 mcg tablet take 1 tablet by mouth once daily 30 Tab 3    zonisamide (ZONEGRAN) 100 mg capsule take 2 capsules by mouth twice a day 120 Cap 3    fluticasone-salmeterol (ADVAIR DISKUS) 250-50 mcg/dose diskus inhaler Take 1 Puff by inhalation every twelve (12) hours.  LYRICA 100 mg capsule take 1 capsule by mouth twice a day **MAX DAILY AMOUNT: 200MG 60 Cap 3    methocarbamol (ROBAXIN) 750 mg tablet Take  by mouth four (4) times daily.  ondansetron hcl (ZOFRAN, AS HYDROCHLORIDE,) 4 mg tablet Take 1 Tab by mouth every eight (8) hours as needed for Nausea. 20 Tab 4    acyclovir (ZOVIRAX) 200 mg capsule Take 200 mg by mouth two (2) times a day.  atenolol (TENORMIN) 100 mg tablet Take 100 mg by mouth two (2) times a day.  diphenhydrAMINE (BENADRYL) 25 mg capsule Take 25 mg by mouth as needed. Indications:  [facility] to [facility] - only takes with evette       Past History     Past Medical History:  Past Medical History:   Diagnosis Date    Asthma     Diabetes ([address]     Fibromyalgia     Hypertension     Lower back injury 02/13/2017    Migraines     Nausea and vomiting 1/14/2014    Other ill-defined conditions(799.89)     cerebral tumor, endometriosis, fibromyalgia, herpes    Right lower quadrant abdominal mass 1/14/2014    Seizures ([address]     Sleep apnea     intolerant to CPAP    Unspecified adverse effect of anesthesia     pt states that she has an asthma attack when coming out of  anesthesia       Past Surgical History:  Past Surgical History:   Procedure Laterality Date    HX CHOLECYSTECTOMY      HX COLECTOMY  3/14/2013    Rt colectomy for perf transverse diverticulum  HX HEENT      wisdom teeth    HX OTHER SURGICAL      laproscopy on ovaries    HX OTHER SURGICAL      left wrist ganglion cystectomy    HX OTHER SURGICAL      lung mass removed       Family History:  Family History   Problem Relation Age of Onset    Other Other      unable to obtain due to AMS    Hypertension Father     Heart Disease Father     Cancer Father     Hypertension Mother     Diabetes Maternal Grandmother     Diabetes Maternal Grandfather        Social History:  Social History   Substance Use Topics    Smoking status: Never Smoker    Smokeless tobacco: Never Used    Alcohol use Yes      Comment: ocasionally       Allergies: Allergies   Allergen Reactions    Lemon Anaphylaxis    Adhesive Tape-Silicones Other (comments)     Pt reports it burns her skin    Dilaudid [Hydromorphone (Pf)] Itching    Fentanyl Shortness of Breath    Percocet [Oxycodone-Acetaminophen] Unknown (comments)     Pt states not allergic     Review of Systems   Review of Systems   Constitutional: Positive for appetite change (decreased). Negative for chills and fever. HENT: Negative for congestion, rhinorrhea and sore throat. Respiratory: Negative for cough and shortness of breath. Cardiovascular: Positive for chest pain. Negative for palpitations. Gastrointestinal: Positive for abdominal pain and nausea. Negative for diarrhea and vomiting. Epigastric pain and L flank pain   Endocrine: Negative for polydipsia, polyphagia and polyuria. Genitourinary: Positive for flank pain and hematuria. Negative for dysuria. Musculoskeletal: Positive for back pain. Negative for neck pain and neck stiffness. Skin: Negative for rash and wound. Allergic/Immunologic: Positive for food allergies. Negative for immunocompromised state. Neurological: Positive for headaches. Negative for dizziness, weakness and numbness. Psychiatric/Behavioral: Negative for agitation and confusion. The patient is nervous/anxious. Physical Exam   Physical Exam   Constitutional: She is oriented to person, place, and time. She appears well-developed and well-nourished. Obese female, alert, in moderate discomfort   HENT:   Head: Normocephalic and atraumatic. Nose: Nose normal.   Mouth/Throat: Oropharynx is clear and moist. No oropharyngeal exudate. Eyes: Conjunctivae and EOM are normal. Pupils are equal, round, and reactive to light. Right eye exhibits no discharge. Left eye exhibits no discharge. No scleral icterus. Neck: Normal range of motion. Neck supple. No JVD present. No tracheal deviation present. No thyromegaly present. Cardiovascular: Normal rate, regular rhythm and normal heart sounds. Pulmonary/Chest: Effort normal and breath sounds normal. No respiratory distress. She has no wheezes. Abdominal: Soft. She exhibits no distension. There is no tenderness. There is no rebound and no guarding. abd soft, no reproducible epigastric tenderness, +L CVAT   Musculoskeletal: Normal range of motion. She exhibits no edema. Lymphadenopathy:     She has no cervical adenopathy. Neurological: She is alert and oriented to person, place, and time. No cranial nerve deficit. She exhibits normal muscle tone. Coordination normal.   FNF neg, no pronator drift   Skin: Skin is warm and dry. She is not diaphoretic. No pallor. Psychiatric: She has a normal mood and affect. Her behavior is normal. Judgment normal.   Nursing note and vitals reviewed.     Diagnostic Study Results     Labs -     Recent Results (from the past 12 hour(s))   CBC WITH AUTOMATED DIFF    Collection Time: 03/11/18  3:05 PM   Result Value Ref Range    WBC 10.2 3.6 - 11.0 K/uL    RBC 4.75 3.80 - 5.20 M/uL    HGB 13.1 11.5 - 16.0 g/dL    HCT 42.9 35.0 - 47.0 %    MCV 90.3 80.0 - 99.0 FL    MCH 27.6 26.0 - 34.0 PG    MCHC 30.5 30.0 - 36.5 g/dL    RDW 16.9 (H) 11.5 - 14.5 %    PLATELET 934 315 - 133 K/uL    MPV 11.8 8.9 - 12.9 FL    NRBC 0.0 0  WBC    ABSOLUTE NRBC 0.00 0.00 - 0.01 K/uL    NEUTROPHILS 71 32 - 75 %    LYMPHOCYTES 19 12 - 49 %    MONOCYTES 7 5 - 13 %    EOSINOPHILS 2 0 - 7 %    BASOPHILS 0 0 - 1 %    IMMATURE GRANULOCYTES 0 0.0 - 0.5 %    ABS. NEUTROPHILS 7.3 1.8 - 8.0 K/UL    ABS. LYMPHOCYTES 2.0 0.8 - 3.5 K/UL    ABS. MONOCYTES 0.7 0.0 - 1.0 K/UL    ABS. EOSINOPHILS 0.2 0.0 - 0.4 K/UL    ABS. BASOPHILS 0.0 0.0 - 0.1 K/UL    ABS. IMM. GRANS. 0.0 0.00 - 0.04 K/UL    DF AUTOMATED     METABOLIC PANEL, COMPREHENSIVE    Collection Time: 03/11/18  3:05 PM   Result Value Ref Range    Sodium 144 136 - 145 mmol/L    Potassium 3.9 3.5 - 5.1 mmol/L    Chloride 114 (H) 97 - 108 mmol/L    CO2 23 21 - 32 mmol/L    Anion gap 7 5 - 15 mmol/L    Glucose 107 (H) 65 - 100 mg/dL    BUN 14 6 - 20 MG/DL    Creatinine 0.93 0.55 - 1.02 MG/DL    BUN/Creatinine ratio 15 12 - 20      GFR est AA >60 >60 ml/min/1.73m2    GFR est non-AA >60 >60 ml/min/1.73m2    Calcium 8.8 8.5 - 10.1 MG/DL    Bilirubin, total 0.3 0.2 - 1.0 MG/DL    ALT (SGPT) 31 12 - 78 U/L    AST (SGOT) 12 (L) 15 - 37 U/L    Alk.  phosphatase 110 45 - 117 U/L    Protein, total 7.2 6.4 - 8.2 g/dL    Albumin 3.3 (L) 3.5 - 5.0 g/dL    Globulin 3.9 2.0 - 4.0 g/dL    A-G Ratio 0.8 (L) 1.1 - 2.2     CK W/ REFLX CKMB    Collection Time: 03/11/18  3:05 PM   Result Value Ref Range    CK 78 26 - 192 U/L   TROPONIN I    Collection Time: 03/11/18  3:05 PM   Result Value Ref Range    Troponin-I, Qt. <0.04 <0.05 ng/mL   URINALYSIS W/ REFLEX CULTURE    Collection Time: 03/11/18  3:13 PM   Result Value Ref Range    Color YELLOW/STRAW      Appearance CLEAR CLEAR      Specific gravity 1.018 1.003 - 1.030      pH (UA) 5.5 5.0 - 8.0      Protein NEGATIVE  NEG mg/dL    Glucose NEGATIVE  NEG mg/dL    Ketone NEGATIVE  NEG mg/dL    Bilirubin NEGATIVE  NEG      Blood LARGE (A) NEG      Urobilinogen 0.2 0.2 - 1.0 EU/dL    Nitrites NEGATIVE  NEG      Leukocyte Esterase NEGATIVE  NEG      WBC 0-4 0 - 4 /hpf    RBC 10-20 0 - 5 /hpf    Epithelial cells FEW FEW /lpf    Bacteria NEGATIVE  NEG /hpf    UA:UC IF INDICATED CULTURE NOT INDICATED BY UA RESULT CNI      Hyaline cast 0-2 0 - 5 /lpf   HCG URINE, QL    Collection Time: 03/11/18  3:13 PM   Result Value Ref Range    HCG urine, QL NEGATIVE  NEG     EKG, 12 LEAD, INITIAL    Collection Time: 03/11/18  3:52 PM   Result Value Ref Range    Ventricular Rate 62 BPM    Atrial Rate 62 BPM    P-R Interval 162 ms    QRS Duration 80 ms    Q-T Interval 404 ms    QTC Calculation (Bezet) 410 ms    Calculated P Axis 39 degrees    Calculated R Axis 3 degrees    Calculated T Axis 8 degrees    Diagnosis       Normal sinus rhythm  Nonspecific ST and T wave abnormality  Abnormal ECG  When compared with ECG of 14-JUL-2015 11:05,  No significant change was found         Radiologic Studies -   CT ABD PELV WO CONT   Final Result        CT Results  (Last 48 hours)               03/11/18 1644  CT ABD PELV WO CONT Final result    Impression:  IMPRESSION:   No renal or ureteral calculus or hydronephrosis demonstrated. Narrative:  EXAM:  CT ABD PELV WO CONT       INDICATION: Back pain over last 2 days, possible kidney stone. Pain localizes to   the lower back on left with associated appetite changes, hematuria, and nausea. COMPARISON: CT 8/7/2017       CONTRAST:  None. TECHNIQUE:    Thin axial images were obtained through the abdomen and pelvis. Coronal and   sagittal reconstructions were generated. Oral contrast was not administered. CT   dose reduction was achieved through use of a standardized protocol tailored for   this examination and automatic exposure control for dose modulation. The absence of intravenous and oral contrast material reduces the capacity of CT   to evaluate the bowel as well as the solid parenchymal organs of the abdomen and   pelvis. FINDINGS:    LUNG BASES: Clear. INCIDENTALLY IMAGED HEART AND MEDIASTINUM: Unremarkable. LIVER: No mass or biliary dilatation.    GALLBLADDER: Status post cholecystectomy. SPLEEN: No mass. PANCREAS: No mass or ductal dilatation. ADRENALS: Unremarkable. KIDNEYS/URETERS: No mass, calculus, or hydronephrosis. STOMACH: Status post gastric bypass. SMALL BOWEL: No dilatation or wall thickening. COLON: No dilatation or wall thickening. The ileocolic anastomosis again shown. A few diverticula noted in sigmoid colon. APPENDIX: Not demonstrated. PERITONEUM: No ascites or pneumoperitoneum. RETROPERITONEUM: No lymphadenopathy or aortic aneurysm. REPRODUCTIVE ORGANS: Unremarkable. URINARY BLADDER: No mass or calculus. BONES: Normal.   ADDITIONAL COMMENTS: N/A               Medical Decision Making   I am the first provider for this patient. I reviewed the vital signs, available nursing notes, past medical history, past surgical history, family history and social history. Vital Signs-Reviewed the patient's vital signs. Patient Vitals for the past 12 hrs:   Temp Pulse Resp BP SpO2   03/11/18 1631 - 80 - - 100 %   03/11/18 1448 97.7 °F (36.5 °C) 72 16 131/73 97 %     Records Reviewed: Nursing Notes and Old Medical Records    Provider Notes (Medical Decision Making):   DDx: Kidney stone, UTI, Pyelonephritis, Migraine, Exacerbation of chronic headache    ED Course:   Initial assessment performed. The patients presenting problems have been discussed, and they are in agreement with the care plan formulated and outlined with them. I have encouraged them to ask questions as they arise throughout their visit. ED EKG interpretation: 15:52  Rhythm: normal sinus rhythm; and regular . Rate (approx.): 62; Axis: normal; WA Interval: normal; QRS interval: normal ; ST/T wave: non-specific changes; This EKG was interpreted by Charity Enrique MD,ED Provider. Disposition:  Discharge Note:  5:39 PM  The pt is ready for discharge.  The pt's signs, symptoms, diagnosis, and discharge instructions have been discussed and pt has conveyed their understanding. The pt is to follow up as recommended or return to ER should their symptoms worsen. Plan has been discussed and pt is in agreement. PLAN:  1. Discharge  Current Discharge Medication List      START taking these medications    Details   HYDROcodone-acetaminophen (NORCO) 5-325 mg per tablet Take 1 Tab by mouth every six (6) hours as needed for Pain for up to 15 doses. Max Daily Amount: 4 Tabs. Qty: 15 Tab, Refills: 0    Associated Diagnoses: Left flank pain      famotidine (PEPCID) 40 mg tablet Take 0.5 Tabs by mouth nightly for 14 days. Qty: 14 Tab, Refills: 0           2. Follow-up Information     Follow up With Details [address], [address]  [phone]      Leonel Chew MD   [address]  [address]  [facility]  [phone]      [facility] EMERGENCY DEPT  If symptoms worsen [address] [address]  [phone]        Return to ED if worse     Diagnosis     Clinical Impression:   1. Left flank pain    2. History of kidney stones    3. Acute intractable headache, unspecified headache type    4. Indigestion        Attestations: This note is prepared by Giuliana Erickson, acting as Scribe for [employer]: The scribe's documentation has been prepared under my direction and personally reviewed by me in its entirety. I confirm that the note above accurately reflects all work, treatment, procedures, and medical decision making performed by me.

## 2018-03-12 LAB
ATRIAL RATE: 62 BPM
CALCULATED P AXIS, ECG09: 39 DEGREES
CALCULATED R AXIS, ECG10: 3 DEGREES
CALCULATED T AXIS, ECG11: 8 DEGREES
DIAGNOSIS, 93000: NORMAL
P-R INTERVAL, ECG05: 162 MS
Q-T INTERVAL, ECG07: 404 MS
QRS DURATION, ECG06: 80 MS
QTC CALCULATION (BEZET), ECG08: 410 MS
VENTRICULAR RATE, ECG03: 62 BPM

## 2018-03-12 NOTE — TELEPHONE ENCOUNTER
Received a call from the patient due to her being concerned that she hasn't received a phone call about her CT being scheduled. I provided the number to the patient care team to have the procedure scheduled. Patient stated the number that I provided is the same number that she has been trying to contact to get the procedure scheduled and she hasn't received a call back. -Offered to call the number for the patient to see if I was able to get in contact with someone and if so would transfer the call to the patient.     Patient was transferred to the patient care team to have the CT scheduled

## 2018-03-12 NOTE — TELEPHONE ENCOUNTER
Received call from patient, she stated that she was supposed to have a procedure tomorrow, and that an MRI that she was supposed to have has been changed to a CT scan. She does not know what is going on and would like a call back.       [phone]

## 2018-03-13 ENCOUNTER — APPOINTMENT (OUTPATIENT)
Dept: MRI IMAGING | Age: 43
End: 2018-03-13
Attending: PSYCHIATRY & NEUROLOGY
Payer: COMMERCIAL

## 2018-03-13 ENCOUNTER — HOSPITAL ENCOUNTER (OUTPATIENT)
Dept: NEUROLOGY | Age: 43
Discharge: HOME | End: 2018-03-13
Attending: PSYCHIATRY & NEUROLOGY
Payer: COMMERCIAL

## 2018-03-13 ENCOUNTER — HOSPITAL ENCOUNTER (OUTPATIENT)
Dept: CT IMAGING | Age: 43
Discharge: HOME | End: 2018-03-13
Attending: PSYCHIATRY & NEUROLOGY
Payer: COMMERCIAL

## 2018-03-13 DIAGNOSIS — R41.0 DISORIENTATION: ICD-10-CM

## 2018-03-13 DIAGNOSIS — G47.33 OBSTRUCTIVE SLEEP APNEA SYNDROME: ICD-10-CM

## 2018-03-13 DIAGNOSIS — R56.9 SEIZURES (HCC): ICD-10-CM

## 2018-03-13 DIAGNOSIS — G93.2 PSEUDOTUMOR CEREBRI: ICD-10-CM

## 2018-03-13 PROCEDURE — 70450 CT HEAD/BRAIN W/O DYE: CPT

## 2018-03-13 PROCEDURE — 95816 EEG AWAKE AND DROWSY: CPT

## 2018-03-14 NOTE — PROCEDURES
[address]  MR#: [id number]  : 1975  ACCOUNT #: [de-identified]   DATE OF SERVICE: 2018    EXAM NUMBER:  [id number]. DESCRIPTION OF PROCEDURE:  Electrodes were applied in accordance with the international 10-20 system of electrode placement. EEG was reviewed in both bipolar and referential montages. DESCRIPTION OF FINDINGS:  Background consists of symmetric 11 Hz activity. This activity attenuates with eye opening. During photic stimulation, there is a symmetric occipital driving. No seizures were noted on this study. IMPRESSION:  Normal EEG. In the absence of seizure, this does not exclude diagnosis of epilepsy. Clinical correlation is advised.       MD Regina Rosario  D: 2018 04:38     T: 2018 09:34  JOB #: [id number]

## 2018-03-21 ENCOUNTER — TELEPHONE (OUTPATIENT)
Dept: NEUROLOGY | Age: 43
End: 2018-03-21

## 2018-03-22 NOTE — TELEPHONE ENCOUNTER
Provided the results of the EEG; patient wanted more information offered an appointment for 04/18 at 9:00, patient stated that she will be here for that appointment

## 2018-03-29 ENCOUNTER — TELEPHONE (OUTPATIENT)
Dept: NEUROLOGY | Age: 43
End: 2018-03-29

## 2018-03-29 NOTE — TELEPHONE ENCOUNTER
----- Message from Lizeth Cronin sent at 3/29/2018 11:41 AM EDT -----  Regarding: Dr. Aminah Ordoñez telephone  The pt is wanting to know since she has migraines so bad can she fly and ride roller coasters? Best contact number is [phone].

## 2018-03-29 NOTE — TELEPHONE ENCOUNTER
Called and wanted to know if she would be able to fly or go on roller costers due to the migraines that she has

## 2018-03-30 ENCOUNTER — TELEPHONE (OUTPATIENT)
Dept: NEUROLOGY | Age: 43
End: 2018-03-30

## 2018-03-30 NOTE — TELEPHONE ENCOUNTER
Spoke with the patient and advised per Dr. Estefania Davis patient is able to fly however unable to ride roller coasters

## 2018-03-30 NOTE — TELEPHONE ENCOUNTER
----- Message from [facility] sent at 3/30/2018 12:21 PM EDT -----  Regarding: Dr. Amalia Live telephone  The pt is requesting the nurse give her a callback since the office was closed for lunch. Best contact number is [phone].

## 2018-03-30 NOTE — TELEPHONE ENCOUNTER
Left message for a call back  (per Dr. Blanka Sena patient is able to fly however not able to ride on [employer])

## 2018-04-18 ENCOUNTER — OFFICE VISIT (OUTPATIENT)
Dept: NEUROLOGY | Age: 43
End: 2018-04-18

## 2018-04-18 ENCOUNTER — HOSPITAL ENCOUNTER (EMERGENCY)
Age: 43
Discharge: HOME | End: 2018-04-18
Attending: EMERGENCY MEDICINE
Payer: COMMERCIAL

## 2018-04-18 ENCOUNTER — APPOINTMENT (OUTPATIENT)
Dept: CT IMAGING | Age: 43
End: 2018-04-18
Attending: PHYSICIAN ASSISTANT
Payer: COMMERCIAL

## 2018-04-18 VITALS
OXYGEN SATURATION: 97 % | DIASTOLIC BLOOD PRESSURE: 55 MMHG | HEIGHT: 64 IN | RESPIRATION RATE: 16 BRPM | SYSTOLIC BLOOD PRESSURE: 109 MMHG | BODY MASS INDEX: 39.09 KG/M2 | TEMPERATURE: 97.6 F | HEART RATE: 74 BPM | WEIGHT: 229 LBS

## 2018-04-18 VITALS
SYSTOLIC BLOOD PRESSURE: 126 MMHG | HEIGHT: 60 IN | DIASTOLIC BLOOD PRESSURE: 82 MMHG | BODY MASS INDEX: 44.96 KG/M2 | WEIGHT: 229 LBS | HEART RATE: 85 BPM

## 2018-04-18 DIAGNOSIS — G93.2 PSEUDOTUMOR CEREBRI: Primary | ICD-10-CM

## 2018-04-18 DIAGNOSIS — R51.9 CHRONIC NONINTRACTABLE HEADACHE, UNSPECIFIED HEADACHE TYPE: Primary | ICD-10-CM

## 2018-04-18 DIAGNOSIS — G89.29 CHRONIC NONINTRACTABLE HEADACHE, UNSPECIFIED HEADACHE TYPE: Primary | ICD-10-CM

## 2018-04-18 DIAGNOSIS — M54.42 CHRONIC BILATERAL LOW BACK PAIN WITH BILATERAL SCIATICA: ICD-10-CM

## 2018-04-18 DIAGNOSIS — G43.711 INTRACTABLE CHRONIC MIGRAINE WITHOUT AURA AND WITH STATUS MIGRAINOSUS: ICD-10-CM

## 2018-04-18 DIAGNOSIS — M54.41 CHRONIC BILATERAL LOW BACK PAIN WITH BILATERAL SCIATICA: ICD-10-CM

## 2018-04-18 DIAGNOSIS — G89.29 CHRONIC BILATERAL LOW BACK PAIN WITH BILATERAL SCIATICA: ICD-10-CM

## 2018-04-18 DIAGNOSIS — R42 POSTURAL DIZZINESS WITH PRESYNCOPE: ICD-10-CM

## 2018-04-18 DIAGNOSIS — R82.71 BACTERIA IN URINE: ICD-10-CM

## 2018-04-18 DIAGNOSIS — R55 POSTURAL DIZZINESS WITH PRESYNCOPE: ICD-10-CM

## 2018-04-18 DIAGNOSIS — F51.01 PRIMARY INSOMNIA: ICD-10-CM

## 2018-04-18 LAB
ALBUMIN SERPL-MCNC: 3 G/DL (ref 3.5–5)
ALBUMIN/GLOB SERPL: 0.8 {RATIO} (ref 1.1–2.2)
ALP SERPL-CCNC: 108 U/L (ref 45–117)
ALT SERPL-CCNC: 16 U/L (ref 12–78)
ANION GAP SERPL CALC-SCNC: 8 MMOL/L (ref 5–15)
ANION GAP SERPL CALC-SCNC: 8 MMOL/L (ref 5–15)
APPEARANCE UR: ABNORMAL
AST SERPL-CCNC: 7 U/L (ref 15–37)
ATRIAL RATE: 67 BPM
BACTERIA URNS QL MICRO: ABNORMAL /HPF
BASOPHILS # BLD: 0 K/UL (ref 0–0.1)
BASOPHILS NFR BLD: 0 % (ref 0–1)
BILIRUB SERPL-MCNC: 0.3 MG/DL (ref 0.2–1)
BILIRUB UR QL: NEGATIVE
BUN SERPL-MCNC: 14 MG/DL (ref 6–20)
BUN SERPL-MCNC: 15 MG/DL (ref 6–20)
BUN/CREAT SERPL: 16 (ref 12–20)
BUN/CREAT SERPL: 19 (ref 12–20)
CALCIUM SERPL-MCNC: 8.6 MG/DL (ref 8.5–10.1)
CALCIUM SERPL-MCNC: 8.7 MG/DL (ref 8.5–10.1)
CALCULATED P AXIS, ECG09: 31 DEGREES
CALCULATED R AXIS, ECG10: 8 DEGREES
CALCULATED T AXIS, ECG11: 22 DEGREES
CHLORIDE SERPL-SCNC: 113 MMOL/L (ref 97–108)
CHLORIDE SERPL-SCNC: 115 MMOL/L (ref 97–108)
CO2 SERPL-SCNC: 23 MMOL/L (ref 21–32)
CO2 SERPL-SCNC: 23 MMOL/L (ref 21–32)
COLOR UR: ABNORMAL
CREAT SERPL-MCNC: 0.81 MG/DL (ref 0.55–1.02)
CREAT SERPL-MCNC: 0.87 MG/DL (ref 0.55–1.02)
DIAGNOSIS, 93000: NORMAL
DIFFERENTIAL METHOD BLD: ABNORMAL
EOSINOPHIL # BLD: 0.2 K/UL (ref 0–0.4)
EOSINOPHIL NFR BLD: 2 % (ref 0–7)
EPITH CASTS URNS QL MICRO: ABNORMAL /LPF
ERYTHROCYTE [DISTWIDTH] IN BLOOD BY AUTOMATED COUNT: 15.9 % (ref 11.5–14.5)
GLOBULIN SER CALC-MCNC: 3.8 G/DL (ref 2–4)
GLUCOSE SERPL-MCNC: 110 MG/DL (ref 65–100)
GLUCOSE SERPL-MCNC: 76 MG/DL (ref 65–100)
GLUCOSE UR STRIP.AUTO-MCNC: NEGATIVE MG/DL
HCG UR QL: NEGATIVE
HCT VFR BLD AUTO: 42.1 % (ref 35–47)
HGB BLD-MCNC: 12.6 G/DL (ref 11.5–16)
HGB UR QL STRIP: NEGATIVE
HYALINE CASTS URNS QL MICRO: ABNORMAL /LPF (ref 0–5)
IMM GRANULOCYTES # BLD: 0 K/UL (ref 0–0.04)
IMM GRANULOCYTES NFR BLD AUTO: 0 % (ref 0–0.5)
KETONES UR QL STRIP.AUTO: NEGATIVE MG/DL
LEUKOCYTE ESTERASE UR QL STRIP.AUTO: NEGATIVE
LYMPHOCYTES # BLD: 1.9 K/UL (ref 0.8–3.5)
LYMPHOCYTES NFR BLD: 27 % (ref 12–49)
MCH RBC QN AUTO: 27.6 PG (ref 26–34)
MCHC RBC AUTO-ENTMCNC: 29.9 G/DL (ref 30–36.5)
MCV RBC AUTO: 92.1 FL (ref 80–99)
MONOCYTES # BLD: 0.4 K/UL (ref 0–1)
MONOCYTES NFR BLD: 6 % (ref 5–13)
MUCOUS THREADS URNS QL MICRO: ABNORMAL /LPF
NEUTS SEG # BLD: 4.5 K/UL (ref 1.8–8)
NEUTS SEG NFR BLD: 64 % (ref 32–75)
NITRITE UR QL STRIP.AUTO: NEGATIVE
NRBC # BLD: 0 K/UL (ref 0–0.01)
NRBC BLD-RTO: 0 PER 100 WBC
P-R INTERVAL, ECG05: 152 MS
PH UR STRIP: 6 [PH] (ref 5–8)
PLATELET # BLD AUTO: 256 K/UL (ref 150–400)
PMV BLD AUTO: 11.5 FL (ref 8.9–12.9)
POTASSIUM SERPL-SCNC: 3.6 MMOL/L (ref 3.5–5.1)
POTASSIUM SERPL-SCNC: 4.2 MMOL/L (ref 3.5–5.1)
PROT SERPL-MCNC: 6.8 G/DL (ref 6.4–8.2)
PROT UR STRIP-MCNC: NEGATIVE MG/DL
Q-T INTERVAL, ECG07: 408 MS
QRS DURATION, ECG06: 82 MS
QTC CALCULATION (BEZET), ECG08: 431 MS
RBC # BLD AUTO: 4.57 M/UL (ref 3.8–5.2)
RBC #/AREA URNS HPF: ABNORMAL /HPF (ref 0–5)
SODIUM SERPL-SCNC: 144 MMOL/L (ref 136–145)
SODIUM SERPL-SCNC: 146 MMOL/L (ref 136–145)
SP GR UR REFRACTOMETRY: 1.02 (ref 1–1.03)
UA: UC IF INDICATED,UAUC: ABNORMAL
UROBILINOGEN UR QL STRIP.AUTO: 0.2 EU/DL (ref 0.2–1)
VENTRICULAR RATE, ECG03: 67 BPM
WBC # BLD AUTO: 7.1 K/UL (ref 3.6–11)
WBC URNS QL MICRO: ABNORMAL /HPF (ref 0–4)

## 2018-04-18 PROCEDURE — 99284 EMERGENCY DEPT VISIT MOD MDM: CPT

## 2018-04-18 PROCEDURE — 96365 THER/PROPH/DIAG IV INF INIT: CPT

## 2018-04-18 PROCEDURE — 96361 HYDRATE IV INFUSION ADD-ON: CPT

## 2018-04-18 PROCEDURE — 36415 COLL VENOUS BLD VENIPUNCTURE: CPT | Performed by: EMERGENCY MEDICINE

## 2018-04-18 PROCEDURE — 93005 ELECTROCARDIOGRAM TRACING: CPT

## 2018-04-18 PROCEDURE — 74011250637 HC RX REV CODE- 250/637: Performed by: EMERGENCY MEDICINE

## 2018-04-18 PROCEDURE — 74011250636 HC RX REV CODE- 250/636: Performed by: EMERGENCY MEDICINE

## 2018-04-18 PROCEDURE — 74011000250 HC RX REV CODE- 250: Performed by: EMERGENCY MEDICINE

## 2018-04-18 PROCEDURE — 96375 TX/PRO/DX INJ NEW DRUG ADDON: CPT

## 2018-04-18 PROCEDURE — 85025 COMPLETE CBC W/AUTO DIFF WBC: CPT | Performed by: EMERGENCY MEDICINE

## 2018-04-18 PROCEDURE — 87086 URINE CULTURE/COLONY COUNT: CPT | Performed by: PHYSICIAN ASSISTANT

## 2018-04-18 PROCEDURE — 70450 CT HEAD/BRAIN W/O DYE: CPT

## 2018-04-18 PROCEDURE — 74011250636 HC RX REV CODE- 250/636: Performed by: PHYSICIAN ASSISTANT

## 2018-04-18 PROCEDURE — 81001 URINALYSIS AUTO W/SCOPE: CPT | Performed by: PHYSICIAN ASSISTANT

## 2018-04-18 PROCEDURE — 80053 COMPREHEN METABOLIC PANEL: CPT | Performed by: EMERGENCY MEDICINE

## 2018-04-18 PROCEDURE — 80048 BASIC METABOLIC PNL TOTAL CA: CPT | Performed by: EMERGENCY MEDICINE

## 2018-04-18 PROCEDURE — 81025 URINE PREGNANCY TEST: CPT | Performed by: EMERGENCY MEDICINE

## 2018-04-18 RX ORDER — NITROFURANTOIN 25; 75 MG/1; MG/1
100 CAPSULE ORAL 2 TIMES DAILY
Qty: 14 CAP | Refills: 0 | Status: SHIPPED | OUTPATIENT
Start: 2018-04-18 | End: 2018-04-25

## 2018-04-18 RX ORDER — FLUOXETINE HYDROCHLORIDE 40 MG/1
CAPSULE ORAL
Qty: 30 CAP | Refills: 11 | Status: SHIPPED | OUTPATIENT
Start: 2018-04-18 | End: 2019-03-06 | Stop reason: ALTCHOICE

## 2018-04-18 RX ORDER — ONDANSETRON 2 MG/ML
4 INJECTION INTRAMUSCULAR; INTRAVENOUS
Status: COMPLETED | OUTPATIENT
Start: 2018-04-18 | End: 2018-04-18

## 2018-04-18 RX ORDER — KETOROLAC TROMETHAMINE 30 MG/ML
30 INJECTION, SOLUTION INTRAMUSCULAR; INTRAVENOUS
Status: COMPLETED | OUTPATIENT
Start: 2018-04-18 | End: 2018-04-18

## 2018-04-18 RX ORDER — ZONISAMIDE 100 MG/1
100 CAPSULE ORAL DAILY
Qty: 30 CAP | Refills: 3 | Status: SHIPPED | OUTPATIENT
Start: 2018-04-18 | End: 2018-08-10 | Stop reason: SDUPTHER

## 2018-04-18 RX ORDER — OXYCODONE HYDROCHLORIDE 5 MG/1
5 TABLET ORAL
Status: COMPLETED | OUTPATIENT
Start: 2018-04-18 | End: 2018-04-18

## 2018-04-18 RX ORDER — MAGNESIUM SULFATE 1 G/100ML
1 INJECTION INTRAVENOUS ONCE
Status: COMPLETED | OUTPATIENT
Start: 2018-04-18 | End: 2018-04-18

## 2018-04-18 RX ORDER — DIPHENHYDRAMINE HYDROCHLORIDE 50 MG/ML
50 INJECTION, SOLUTION INTRAMUSCULAR; INTRAVENOUS
Status: COMPLETED | OUTPATIENT
Start: 2018-04-18 | End: 2018-04-18

## 2018-04-18 RX ORDER — OXYCODONE HYDROCHLORIDE 5 MG/1
5 TABLET ORAL
Qty: 8 TAB | Refills: 0 | Status: SHIPPED | OUTPATIENT
Start: 2018-04-18 | End: 2018-10-05

## 2018-04-18 RX ORDER — QUETIAPINE FUMARATE 50 MG/1
50 TABLET, FILM COATED ORAL
Qty: 30 TAB | Refills: 4 | Status: SHIPPED | OUTPATIENT
Start: 2018-04-18 | End: 2018-11-06 | Stop reason: SDUPTHER

## 2018-04-18 RX ADMIN — METHYLPREDNISOLONE SODIUM SUCCINATE 125 MG: 125 INJECTION, POWDER, FOR SOLUTION INTRAMUSCULAR; INTRAVENOUS at 12:50

## 2018-04-18 RX ADMIN — OXYCODONE HYDROCHLORIDE 5 MG: 5 TABLET ORAL at 14:48

## 2018-04-18 RX ADMIN — SODIUM CHLORIDE 10 MG: 9 INJECTION INTRAMUSCULAR; INTRAVENOUS; SUBCUTANEOUS at 13:29

## 2018-04-18 RX ADMIN — ONDANSETRON 4 MG: 2 INJECTION INTRAMUSCULAR; INTRAVENOUS at 12:49

## 2018-04-18 RX ADMIN — KETOROLAC TROMETHAMINE 30 MG: 30 INJECTION, SOLUTION INTRAMUSCULAR at 12:50

## 2018-04-18 RX ADMIN — DIPHENHYDRAMINE HYDROCHLORIDE 50 MG: 50 INJECTION, SOLUTION INTRAMUSCULAR; INTRAVENOUS at 12:50

## 2018-04-18 RX ADMIN — SODIUM CHLORIDE 1000 ML: 900 INJECTION, SOLUTION INTRAVENOUS at 12:49

## 2018-04-18 RX ADMIN — MAGNESIUM SULFATE HEPTAHYDRATE 1 G: 1 INJECTION, SOLUTION INTRAVENOUS at 14:49

## 2018-04-18 NOTE — PATIENT INSTRUCTIONS
[facility]   Statement to Patients  April 1, 2014      In an effort to ensure the large volume of patient prescription refills is processed in the most efficient and expeditious manner, we are asking our patients to assist us by calling your Pharmacy for all prescription refills, this will include also your  Mail Order Pharmacy. The pharmacy will contact our office electronically to continue the refill process. Please do not wait until the last minute to call your pharmacy. We need at least 48 hours (2days) to fill prescriptions. We also encourage you to call your pharmacy before going to  your prescription to make sure it is ready. With regard to controlled substance prescription refill requests (narcotic refills) that need to be picked up at our office, we ask your cooperation by providing us with at least 72 hours (3days) notice that you will need a refill. We will not refill narcotic prescription refill requests after 4:00pm on any weekday, Monday through Thursday, or after 2:00pm on Fridays, or on the weekends. We encourage everyone to explore another way of getting your prescription refill request processed using Aframe, our patient web portal through our electronic medical record system. Aframe is an efficient and effective way to communicate your medication request directly to the office and  downloadable as an napoleon on your smart phone . Aframe also features a review functionality that allows you to view your medication list as well as leave messages for your physician. Are you ready to get connected? If so please review the attatched instructions or speak to any of our staff to get you set up right away! Thank you so much for your cooperation. Should you have any questions please contact our Practice Administrator.     The Physicians and Staff,  [facility]     Please be advised there is a $25 fee for all paperwork to be completed from our  providers. This is to be paid by the patient prior to picking up the completed forms. A Healthy Lifestyle: Care Instructions  Your Care Instructions    A healthy lifestyle can help you feel good, stay at a healthy weight, and have plenty of energy for both work and play. A healthy lifestyle is something you can share with your whole family. A healthy lifestyle also can lower your risk for serious health problems, such as high blood pressure, heart disease, and diabetes. You can follow a few steps listed below to improve your health and the health of your family. Follow-up care is a key part of your treatment and safety. Be sure to make and go to all appointments, and call your doctor if you are having problems. It's also a good idea to know your test results and keep a list of the medicines you take. How can you care for yourself at home? · Do not eat too much sugar, fat, or fast foods. You can still have dessert and treats now and then. The goal is moderation. · Start small to improve your eating habits. Pay attention to portion sizes, drink less juice and soda pop, and eat more fruits and vegetables. ¨ Eat a healthy amount of food. A 3-ounce serving of meat, for example, is about the size of a deck of cards. Fill the rest of your plate with vegetables and whole grains. ¨ Limit the amount of soda and sports drinks you have every day. Drink more water when you are thirsty. ¨ Eat at least 5 servings of fruits and vegetables every day. It may seem like a lot, but it is not hard to reach this goal. A serving or helping is 1 piece of fruit, 1 cup of vegetables, or 2 cups of leafy, raw vegetables. Have an apple or some carrot sticks as an afternoon snack instead of a candy bar. Try to have fruits and/or vegetables at every meal.  · Make exercise part of your daily routine. You may want to start with simple activities, such as walking, bicycling, or slow swimming.  Try to be active 30 to 60 minutes every day. You do not need to do all 30 to 60 minutes all at once. For example, you can exercise 3 times a day for 10 or 20 minutes. Moderate exercise is safe for most people, but it is always a good idea to talk to your doctor before starting an exercise program.  · Keep moving. Shira Karel the lawn, work in the garden, or [employer]. Take the stairs instead of the elevator at work. · If you smoke, quit. People who smoke have an increased risk for heart attack, stroke, cancer, and other lung illnesses. Quitting is hard, but there are ways to boost your chance of quitting tobacco for good. ¨ Use nicotine gum, patches, or lozenges. ¨ Ask your doctor about stop-smoking programs and medicines. ¨ Keep trying. In addition to reducing your risk of diseases in the future, you will notice some benefits soon after you stop using tobacco. If you have shortness of breath or asthma symptoms, they will likely get better within a few weeks after you quit. · Limit how much alcohol you drink. Moderate amounts of alcohol (up to 2 drinks a day for men, 1 drink a day for women) are okay. But drinking too much can lead to liver problems, high blood pressure, and other health problems. Family health  If you have a family, there are many things you can do together to improve your health. · Eat meals together as a family as often as possible. · Eat healthy foods. This includes fruits, vegetables, lean meats and dairy, and whole grains. · Include your family in your fitness plan. Most people think of activities such as jogging or tennis as the way to fitness, but there are many ways you and your family can be more active. Anything that makes you breathe hard and gets your heart pumping is exercise. Here are some tips:  ¨ Walk to do errands or to take your child to school or the bus. ¨ Go for a family bike ride after dinner instead of watching TV. Where can you learn more?   Go to http://dusty-ari.info/. Enter U769 in the search box to learn more about \"A Healthy Lifestyle: Care Instructions. \"  Current as of: May 12, 2017  Content Version: 11.4  © 6045-0485 Healthwise, [employer]. Care instructions adapted under license by [employer] (which disclaims liability or warranty for this information). If you have questions about a medical condition or this instruction, always ask your healthcare professional. [address] any warranty or liability for your use of this information.

## 2018-04-18 NOTE — ED PROVIDER NOTES
EMERGENCY DEPARTMENT HISTORY AND PHYSICAL EXAM      Date: 4/18/2018  Patient Name: Izaiah Ramachandran have seen and evaluated this patient in the Express Care portion of triage for a headache x 1 week and dizziness. Pt was seen at neurology today. She states she had an episode of vomiting and she \"passed out\". She laid down and still felt dizzy. Pt was brought by EMS. The patients care will begin now and orders have been placed. This patient will be seen and provided further care in the Emergency Room. Written by [facility], a scribe for NAYELI Bermeo. History of Presenting Illness     Chief Complaint   Patient presents with    Headache     pt brought by EMS from neurology center for migraine headache x1 week with nausea    Nausea       History Provided By: Patient    HPI: Gale Aragon, 38 y.o. female with PMHx significant for HTN, DM, fibromyalgia, seizures, and migraines, presents via EMS to the ED with cc of HA x ~1 week. Pt reports associated nausea, photophobia, phonophobia, and dizziness; she states her symptoms are consistent with past episodes of migraine. She also c/o lower back pain, which she states is exacerbated by movement and positional changes. Pt states she was referred to ED from her Neurologist's office today. She states she \"gets migraines all the time,\" noting she generally experiences similar symptoms every ~2-3 days at baseline. Pt states she takes her medications as prescribed without improvement, and has taken OTC NSAIDs without with improvement, but generally experiences improvement in her symptoms with oxycodone, which she has \"run out of.\" Pt notes she was recently treated for UTI, but otherwise denies recent illness. She denies recent trauma or injury to which her symptoms might be attributed. Pt states she takes metformin daily, and had BGL ~89 on glucometer at home yesterday (4/17/18). She states she is allergic to fentanyl and dilaudid.  Pt denies currently taking birth control. She specifically denies fever or dysuria. PCP: Milan Boo MD  Neurology: Mathieu Larsen MD    There are no other complaints, changes, or physical findings at this time. Current Outpatient Prescriptions   Medication Sig Dispense Refill    oxyCODONE IR (ROXICODONE) 5 mg immediate release tablet Take 1 Tab by mouth every six (6) hours as needed for Pain (breakthrough pain). Max Daily Amount: 20 mg. Indications: causes drowsiness;do not drink,drive, or use machinery while taking; take with stool softener 8 Tab 0    nitrofurantoin, macrocrystal-monohydrate, (MACROBID) 100 mg capsule Take 1 Cap by mouth two (2) times a day for 7 days. Indications: BACTERIAL URINARY TRACT INFECTION 14 Cap 0    QUEtiapine (SEROQUEL) 50 mg tablet Take 1 Tab by mouth nightly. 30 Tab 4    zonisamide (ZONEGRAN) 100 mg capsule Take 1 Cap by mouth daily. 30 Cap 3    FLUoxetine (PROZAC) 40 mg capsule Take one cap by mouth daily 30 Cap 11    promethazine (PHENERGAN) 25 mg tablet Take 1 Tab by mouth every six (6) hours as needed for Nausea. 45 Tab 3    Syringe-Needle, Safety,Disp Un 3 mL 25 gauge x 1\" syrg Use as directed 10 Pen Needle 1    acetaZOLAMIDE SR (DIAMOX) 500 mg capsule Take 2 Caps by mouth two (2) times a day. 60 Cap 2    DIGITEK 125 mcg tablet take 1 tablet by mouth once daily 30 Tab 3    zonisamide (ZONEGRAN) 100 mg capsule take 2 capsules by mouth twice a day 120 Cap 3    fluticasone-salmeterol (ADVAIR DISKUS) 250-50 mcg/dose diskus inhaler Take 1 Puff by inhalation every twelve (12) hours.  LYRICA 100 mg capsule take 1 capsule by mouth twice a day **MAX DAILY AMOUNT: 200MG 60 Cap 3    methocarbamol (ROBAXIN) 750 mg tablet Take  by mouth four (4) times daily.  ondansetron hcl (ZOFRAN, AS HYDROCHLORIDE,) 4 mg tablet Take 1 Tab by mouth every eight (8) hours as needed for Nausea. 20 Tab 4    acyclovir (ZOVIRAX) 200 mg capsule Take 200 mg by mouth two (2) times a day.  atenolol (TENORMIN) 100 mg tablet Take 100 mg by mouth two (2) times a day.  diphenhydrAMINE (BENADRYL) 25 mg capsule Take 25 mg by mouth as needed. Indications: Allerigc to [employer] - only takes with evette         Past History     Past Medical History:  Past Medical History:   Diagnosis Date    Asthma     Diabetes ([facility] [address]     Fibromyalgia     Hypertension     Lower back injury 02/13/2017    Migraines     Nausea and vomiting 1/14/2014    Other ill-defined conditions(799.89)     cerebral tumor, endometriosis, fibromyalgia, herpes    Right lower quadrant abdominal mass 1/14/2014    Seizures ([facility] [address]     Sleep apnea     intolerant to CPAP    Unspecified adverse effect of anesthesia     pt states that she has an asthma attack when coming out of  anesthesia       Past Surgical History:  Past Surgical History:   Procedure Laterality Date    HX CHOLECYSTECTOMY      HX COLECTOMY  3/14/2013    Rt colectomy for perf transverse diverticulum    HX HEENT      wisdom teeth    HX OTHER SURGICAL      laproscopy on ovaries    HX OTHER SURGICAL      left wrist ganglion cystectomy    HX OTHER SURGICAL      lung mass removed       Family History:  Family History   Problem Relation Age of Onset    Other Other      unable to obtain due to AMS    Hypertension Father     Heart Disease Father     Cancer Father     Hypertension Mother     Diabetes Maternal Grandmother     Diabetes Maternal Grandfather        Social History:  Social History   Substance Use Topics    Smoking status: Never Smoker    Smokeless tobacco: Never Used    Alcohol use Yes      Comment: ocasionally       Allergies:   Allergies   Allergen Reactions    Lemon Anaphylaxis    Adhesive Tape-Silicones Other (comments)     Pt reports it burns her skin    Dilaudid [Hydromorphone (Pf)] Itching    Fentanyl Shortness of Breath    Percocet [Oxycodone-Acetaminophen] Unknown (comments)     Pt states not allergic         Review of Systems Review of Systems   Constitutional: Negative for chills and fever. HENT: Negative for congestion. Eyes: Positive for photophobia. Negative for visual disturbance. Respiratory: Negative for chest tightness. Cardiovascular: Negative for chest pain and leg swelling. Gastrointestinal: Positive for nausea. Negative for abdominal pain and vomiting. Endocrine: Negative for polyuria. Genitourinary: Negative for dysuria and frequency. Musculoskeletal: Positive for back pain. Negative for myalgias. Skin: Negative for color change. Allergic/Immunologic: Negative for immunocompromised state. Neurological: Positive for dizziness and headaches. Negative for numbness. Physical Exam   Physical Exam     Nursing note and vitals reviewed.   General appearance: non-toxic  Eyes: PERRL, EOMI, conjunctiva normal, anicteric sclera  HEENT: mucous membranes dry, oropharynx is clear, neck is supple; piercing right nare, also above left eyebrow  Pulmonary: clear to auscultation bilaterally  Cardiac: normal rate and regular rhythm, no murmurs, gallops, or rubs, 2+ peripheral pulses, cap refill < 2 seconds  Abdomen: soft, nontender, nondistended, bowel sounds present  MSK: no lower extremity edema, mild TTP lower lumbar paraspinals, no midline TTP or step off  Neuro: Alert, answers questions appropriately, 5/5 strength in all 4 extremities, VFF, finger to nose normal, light touch intact in all four extremities, cranial nerves II-XII intact      Diagnostic Study Results     Labs -     Recent Results (from the past 12 hour(s))   METABOLIC PANEL, BASIC    Collection Time: 04/18/18 12:14 PM   Result Value Ref Range    Sodium 144 136 - 145 mmol/L    Potassium 4.2 3.5 - 5.1 mmol/L    Chloride 113 (H) 97 - 108 mmol/L    CO2 23 21 - 32 mmol/L    Anion gap 8 5 - 15 mmol/L    Glucose 76 65 - 100 mg/dL    BUN 15 6 - 20 MG/DL    Creatinine 0.81 0.55 - 1.02 MG/DL    BUN/Creatinine ratio 19 12 - 20      GFR est AA >60 >60 ml/min/1.73m2    GFR est non-AA >60 >60 ml/min/1.73m2    Calcium 8.6 8.5 - 10.1 MG/DL   CBC WITH AUTOMATED DIFF    Collection Time: 04/18/18 12:14 PM   Result Value Ref Range    WBC 7.1 3.6 - 11.0 K/uL    RBC 4.57 3.80 - 5.20 M/uL    HGB 12.6 11.5 - 16.0 g/dL    HCT 42.1 35.0 - 47.0 %    MCV 92.1 80.0 - 99.0 FL    MCH 27.6 26.0 - 34.0 PG    MCHC 29.9 (L) 30.0 - 36.5 g/dL    RDW 15.9 (H) 11.5 - 14.5 %    PLATELET 333 726 - 745 K/uL    MPV 11.5 8.9 - 12.9 FL    NRBC 0.0 0  WBC    ABSOLUTE NRBC 0.00 0.00 - 0.01 K/uL    NEUTROPHILS 64 32 - 75 %    LYMPHOCYTES 27 12 - 49 %    MONOCYTES 6 5 - 13 %    EOSINOPHILS 2 0 - 7 %    BASOPHILS 0 0 - 1 %    IMMATURE GRANULOCYTES 0 0.0 - 0.5 %    ABS. NEUTROPHILS 4.5 1.8 - 8.0 K/UL    ABS. LYMPHOCYTES 1.9 0.8 - 3.5 K/UL    ABS. MONOCYTES 0.4 0.0 - 1.0 K/UL    ABS. EOSINOPHILS 0.2 0.0 - 0.4 K/UL    ABS. BASOPHILS 0.0 0.0 - 0.1 K/UL    ABS. IMM.  GRANS. 0.0 0.00 - 0.04 K/UL    DF AUTOMATED     EKG, 12 LEAD, INITIAL    Collection Time: 04/18/18 12:34 PM   Result Value Ref Range    Ventricular Rate 67 BPM    Atrial Rate 67 BPM    P-R Interval 152 ms    QRS Duration 82 ms    Q-T Interval 408 ms    QTC Calculation (Bezet) 431 ms    Calculated P Axis 31 degrees    Calculated R Axis 8 degrees    Calculated T Axis 22 degrees    Diagnosis       Normal sinus rhythm  Nonspecific T wave abnormality  When compared with ECG of 11-MAR-2018 15:52,  No significant change was found  Confirmed by Shira Campbell ([id number]) on 4/18/2018 3:52:04 PM     URINALYSIS W/ REFLEX CULTURE    Collection Time: 04/18/18 12:47 PM   Result Value Ref Range    Color YELLOW/STRAW      Appearance HAZY (A) CLEAR      Specific gravity 1.020 1.003 - 1.030      pH (UA) 6.0 5.0 - 8.0      Protein NEGATIVE  NEG mg/dL    Glucose NEGATIVE  NEG mg/dL    Ketone NEGATIVE  NEG mg/dL    Bilirubin NEGATIVE  NEG      Blood NEGATIVE  NEG      Urobilinogen 0.2 0.2 - 1.0 EU/dL    Nitrites NEGATIVE  NEG      Leukocyte Esterase NEGATIVE  NEG      WBC 0-4 0 - 4 /hpf    RBC 0-5 0 - 5 /hpf    Epithelial cells MANY (A) FEW /lpf    Bacteria 3+ (A) NEG /hpf    UA:UC IF INDICATED URINE CULTURE ORDERED (A) CNI      Mucus 2+ (A) NEG /lpf    Hyaline cast 0-2 0 - 5 /lpf   METABOLIC PANEL, COMPREHENSIVE    Collection Time: 04/18/18  2:34 PM   Result Value Ref Range    Sodium 146 (H) 136 - 145 mmol/L    Potassium 3.6 3.5 - 5.1 mmol/L    Chloride 115 (H) 97 - 108 mmol/L    CO2 23 21 - 32 mmol/L    Anion gap 8 5 - 15 mmol/L    Glucose 110 (H) 65 - 100 mg/dL    BUN 14 6 - 20 MG/DL    Creatinine 0.87 0.55 - 1.02 MG/DL    BUN/Creatinine ratio 16 12 - 20      GFR est AA >60 >60 ml/min/1.73m2    GFR est non-AA >60 >60 ml/min/1.73m2    Calcium 8.7 8.5 - 10.1 MG/DL    Bilirubin, total 0.3 0.2 - 1.0 MG/DL    ALT (SGPT) 16 12 - 78 U/L    AST (SGOT) 7 (L) 15 - 37 U/L    Alk. phosphatase 108 45 - 117 U/L    Protein, total 6.8 6.4 - 8.2 g/dL    Albumin 3.0 (L) 3.5 - 5.0 g/dL    Globulin 3.8 2.0 - 4.0 g/dL    A-G Ratio 0.8 (L) 1.1 - 2.2         Radiologic Studies -   CT Results  (Last 48 hours)               04/18/18 1151  CT HEAD WO CONT Final result    Impression:  IMPRESSION:       No evidence for acute intracranial abnormality                   Narrative:  INDICATION:   Acute headache for one week with nausea        EXAM:  HEAD CT WITHOUT CONTRAST       COMPARISON:  3/13/2018       TECHNIQUE:  Routine noncontrast axial head CT was performed. Coronal and   sagittal multiplanar reconstructions were obtained. CT dose reduction was   achieved through use of a standardized protocol tailored for this examination   and automatic exposure control for dose modulation. FINDINGS:       The ventricles and cortical sulci are within normal limits. No evidence for acute intracranial hemorrhage, midline shift, or mass effect. Gray-white matter differentiation is preserved. The basal cisterns are patent.        The visualized paranasal sinuses and mastoid air cells are clear. No calvarial abnormality. Medical Decision Making   I am the first provider for this patient. I reviewed the vital signs, available nursing notes, past medical history, past surgical history, family history and social history. Vital Signs-Reviewed the patient's vital signs. Patient Vitals for the past 12 hrs:   Temp Pulse Resp BP SpO2   04/18/18 1545 - - - 109/55 -   04/18/18 1415 - - - 122/85 97 %   04/18/18 1335 - - - 109/63 -   04/18/18 1056 97.6 °F (36.4 °C) 74 16 120/75 98 %       Pulse Oximetry Analysis - 98% on RA    Cardiac Monitor:   Rate: 67 bpm  Rhythm: Normal Sinus Rhythm      EKG interpretation:  12:34  Rhythm: normal sinus rhythm; and regular . Rate (approx.): 67; Axis: normal; QRS interval: normal ; ST/T wave: no ST elevation, no ST depression; nonspecific T wave abnormality  DE interval 152 ms, QRS 82 ms,  ms, QTc 431 ms. 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 Will defer to neuro for further eval/intervention. Per neuro notes, pt to stay off diamox. ED Course:   Initial assessment performed. The patients presenting problems have been discussed, and they are in agreement with the care plan formulated and outlined with them. I have encouraged them to ask questions as they arise throughout their visit. Disposition:  DISCHARGE NOTE:  4:45 PM  The patient is ready for discharge. The patients signs, symptoms, diagnosis, and instructions for discharge have been discussed and the pt has conveyed their understanding. The patient is to follow up as recommended or return to the ER should their symptoms worsen.  Plan has been discussed and patient has conveyed their agreement. PLAN:  1. Current Discharge Medication List      START taking these medications    Details   oxyCODONE IR (ROXICODONE) 5 mg immediate release tablet Take 1 Tab by mouth every six (6) hours as needed for Pain (breakthrough pain). Max Daily Amount: 20 mg. Indications: causes drowsiness;do not drink,drive, or use machinery while taking; take with stool softener  Qty: 8 Tab, Refills: 0    Associated Diagnoses: Chronic nonintractable headache, unspecified headache type      nitrofurantoin, macrocrystal-monohydrate, (MACROBID) 100 mg capsule Take 1 Cap by mouth two (2) times a day for 7 days. Indications: BACTERIAL URINARY TRACT INFECTION  Qty: 14 Cap, Refills: 0         CONTINUE these medications which have NOT CHANGED    Details   QUEtiapine (SEROQUEL) 50 mg tablet Take 1 Tab by mouth nightly. Qty: 30 Tab, Refills: 4    Associated Diagnoses: Primary insomnia      !! zonisamide (ZONEGRAN) 100 mg capsule Take 1 Cap by mouth daily. Qty: 30 Cap, Refills: 3    Associated Diagnoses: Intractable chronic migraine without aura and with status migrainosus      FLUoxetine (PROZAC) 40 mg capsule Take one cap by mouth daily  Qty: 30 Cap, Refills: 11    Associated Diagnoses: Intractable chronic migraine without aura and with status migrainosus      promethazine (PHENERGAN) 25 mg tablet Take 1 Tab by mouth every six (6) hours as needed for Nausea. Qty: 45 Tab, Refills: 3      Syringe-Needle, Safety,Disp Un 3 mL 25 gauge x 1\" syrg Use as directed  Qty: 10 Pen Needle, Refills: 1    Associated Diagnoses: B12 deficiency      acetaZOLAMIDE SR (DIAMOX) 500 mg capsule Take 2 Caps by mouth two (2) times a day.   Qty: 60 Cap, Refills: 2    Associated Diagnoses: Pseudotumor cerebri      DIGITEK 125 mcg tablet take 1 tablet by mouth once daily  Qty: 30 Tab, Refills: 3    Associated Diagnoses: Pseudotumor cerebri      !! zonisamide (ZONEGRAN) 100 mg capsule take 2 capsules by mouth twice a day  Qty: 120 Cap, Refills: 3      fluticasone-salmeterol (ADVAIR DISKUS) 250-50 mcg/dose diskus inhaler Take 1 Puff by inhalation every twelve (12) hours. LYRICA 100 mg capsule take 1 capsule by mouth twice a day **MAX DAILY AMOUNT: 200MG  Qty: 60 Cap, Refills: 3    Associated Diagnoses: Fibromyalgia      methocarbamol (ROBAXIN) 750 mg tablet Take  by mouth four (4) times daily. ondansetron hcl (ZOFRAN, AS HYDROCHLORIDE,) 4 mg tablet Take 1 Tab by mouth every eight (8) hours as needed for Nausea. Qty: 20 Tab, Refills: 4      acyclovir (ZOVIRAX) 200 mg capsule Take 200 mg by mouth two (2) times a day. atenolol (TENORMIN) 100 mg tablet Take 100 mg by mouth two (2) times a day. diphenhydrAMINE (BENADRYL) 25 mg capsule Take 25 mg by mouth as needed. Indications: Allerigc to [facility] - only takes with evette       ! ! - Potential duplicate medications found. Please discuss with provider. STOP taking these medications       HYDROcodone-acetaminophen (NORCO) 5-325 mg per tablet Comments:   Reason for Stoppin.   Follow-up Information     Follow up With Details Comments Contact Info    Rachel Mckeon MD Schedule an appointment as soon as possible for a visit in 2 days  [address]  [phone]      [facility] EMERGENCY DEPT Go in 1 day If symptoms worsen [address] Ferdinand Arreguin MD Schedule an appointment as soon as possible for a visit in 1 week  [place] [phone]          Return to ED if worse     Diagnosis     Clinical Impression:   1. Chronic nonintractable headache, unspecified headache type    2. Bacteria in urine        Attestations: This note is prepared by Elise Barrera, acting as Scribe for [employer]. Abdi Shanks MD.    [employer]. Abdi Shanks MD: The scribe's documentation has been prepared under my direction and personally reviewed by me in its entirety.  I confirm that the note above accurately reflects all work, treatment, procedures, and medical decision making performed by me.

## 2018-04-18 NOTE — DISCHARGE INSTRUCTIONS
Head or Face Pain: Care Instructions  Your Care Instructions    Common causes of head or face pain are allergies, stress, and injuries. Other causes include tooth problems and sinus infections. Eating certain foods, such as chocolate or cheese, or drinking certain liquids, such as coffee or cola, can cause head pain for some people. If you have mild head pain, you may not need treatment. It is important to watch your symptoms and talk to your doctor if your pain continues or gets worse. Follow-up care is a key part of your treatment and safety. Be sure to make and go to all appointments, and call your doctor if you are having problems. It's also a good idea to know your test results and keep a list of the medicines you take. How can you care for yourself at home? · Take pain medicines exactly as directed. ¨ If the doctor gave you a prescription medicine for pain, take it as prescribed. ¨ If you are not taking a prescription pain medicine, ask your doctor if you can take an over-the-counter pain medicine. · Take it easy for the next few days or longer if you are not feeling well. · Use a warm, moist towel or heating pad set on low to relax tight muscles in your shoulder and neck. Have someone gently massage your neck and shoulders. · Put ice or a cold pack on the area for 10 to 20 minutes at a time. Put a thin cloth between the ice and your skin. When should you call for help? Call 911 anytime you think you may need emergency care. For example, call if:  ? · You have twitching, jerking, or a seizure. ? · You passed out (lost consciousness). ? · You have symptoms of a stroke. These may include:  ¨ Sudden numbness, tingling, weakness, or loss of movement in your face, arm, or leg, especially on only one side of your body. ¨ Sudden vision changes. ¨ Sudden trouble speaking. ¨ Sudden confusion or trouble understanding simple statements. ¨ Sudden problems with walking or balance.   ¨ A sudden, severe headache that is different from past headaches. ? · You have jaw pain and pain in your chest, shoulder, neck, or arm. ?Call your doctor now or seek immediate medical care if:  ? · You have a fever with a stiff neck or a severe headache. ? · You have nausea and vomiting, or you cannot keep food or liquids down. ? Watch closely for changes in your health, and be sure to contact your doctor if:  ? · Your head or face pain does not get better as expected. Where can you learn more? Go to http://dusty-ari.info/. Enter P568 in the search box to learn more about \"Head or Face Pain: Care Instructions. \"  Current as of: March 20, 2017  Content Version: 11.4  © 6199-5381 [employer]. Care instructions adapted under license by [employer] (which disclaims liability or warranty for this information). If you have questions about a medical condition or this instruction, always ask your healthcare professional. [place] [address] any warranty or liability for your use of this information. Urinary Tract Infection in Women: Care Instructions  Your Care Instructions    A urinary tract infection, or UTI, is a general term for an infection anywhere between the kidneys and the urethra (where urine comes out). Most UTIs are bladder infections. They often cause pain or burning when you urinate. UTIs are caused by bacteria and can be cured with antibiotics. Be sure to complete your treatment so that the infection goes away. Follow-up care is a key part of your treatment and safety. Be sure to make and go to all appointments, and call your doctor if you are having problems. It's also a good idea to know your test results and keep a list of the medicines you take. How can you care for yourself at home? · Take your antibiotics as directed. Do not stop taking them just because you feel better. You need to take the full course of antibiotics.   · Drink extra water and other fluids for the next day or two. This may help wash out the bacteria that are causing the infection. (If you have kidney, heart, or liver disease and have to limit fluids, talk with your doctor before you increase your fluid intake.)  · Avoid drinks that are carbonated or have caffeine. They can irritate the bladder. · Urinate often. Try to empty your bladder each time. · To relieve pain, take a hot bath or lay a heating pad set on low over your lower belly or genital area. Never go to sleep with a heating pad in place. To prevent UTIs  · Drink plenty of water each day. This helps you urinate often, which clears bacteria from your system. (If you have kidney, heart, or liver disease and have to limit fluids, talk with your doctor before you increase your fluid intake.)  · Urinate when you need to. · Urinate right after you have sex. · Change sanitary pads often. · Avoid douches, bubble baths, feminine hygiene sprays, and other feminine hygiene products that have deodorants. · After going to the bathroom, wipe from front to back. When should you call for help? Call your doctor now or seek immediate medical care if:  ? · Symptoms such as fever, chills, nausea, or vomiting get worse or appear for the first time. ? · You have new pain in your back just below your rib cage. This is called flank pain. ? · There is new blood or pus in your urine. ? · You have any problems with your antibiotic medicine. ? Watch closely for changes in your health, and be sure to contact your doctor if:  ? · You are not getting better after taking an antibiotic for 2 days. ? · Your symptoms go away but then come back. Where can you learn more? Go to http://dusty-ari.info/. Enter R194 in the search box to learn more about \"Urinary Tract Infection in Women: Care Instructions. \"  Current as of: May 12, 2017  Content Version: 11.4  © 7888-7126 [employer], [employer].  Care instructions adapted under license by [employer] (which disclaims liability or warranty for this information). If you have questions about a medical condition or this instruction, always ask your healthcare professional. [address] any warranty or liability for your use of this information.
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
## 2018-04-19 LAB
BACTERIA SPEC CULT: NORMAL
CC UR VC: NORMAL
SERVICE CMNT-IMP: NORMAL

## 2018-07-24 DIAGNOSIS — G93.2 PSEUDOTUMOR CEREBRI: ICD-10-CM

## 2018-07-24 NOTE — TELEPHONE ENCOUNTER
----- Message from Nicole Garcia sent at 7/24/2018  3:06 PM EDT -----  Regarding: [facility]/Rx  Pt is requesting a Rx for Acetazolamide called to [facility]. She has no medication left. Pts number is [phone] and Genoveva Ferrell number is  [phone].

## 2018-07-24 NOTE — TELEPHONE ENCOUNTER
No future appointments. Last Appointment My Department:  4/18/2018    Would you like to refill below? Requested Prescriptions     Pending Prescriptions Disp Refills    acetaZOLAMIDE SR (DIAMOX) 500 mg capsule 60 Cap 2     Sig: Take 2 Caps by mouth two (2) times a day.

## 2018-07-25 RX ORDER — ACETAZOLAMIDE 500 MG/1
1000 CAPSULE, EXTENDED RELEASE ORAL 2 TIMES DAILY
Qty: 60 CAP | Refills: 2 | Status: SHIPPED | OUTPATIENT
Start: 2018-07-25 | End: 2018-10-05

## 2018-08-07 DIAGNOSIS — G43.711 INTRACTABLE CHRONIC MIGRAINE WITHOUT AURA AND WITH STATUS MIGRAINOSUS: ICD-10-CM

## 2018-08-10 RX ORDER — ZONISAMIDE 100 MG/1
CAPSULE ORAL
Qty: 120 CAP | Refills: 3 | Status: SHIPPED | COMMUNITY
Start: 2018-08-10 | End: 2018-11-06 | Stop reason: SDUPTHER

## 2018-08-16 RX ORDER — PROMETHAZINE HYDROCHLORIDE 25 MG/1
TABLET ORAL
Qty: 45 TAB | Refills: 3 | Status: SHIPPED | OUTPATIENT
Start: 2018-08-16 | End: 2018-10-05

## 2018-09-24 ENCOUNTER — TELEPHONE (OUTPATIENT)
Dept: NEUROLOGY | Age: 43
End: 2018-09-24

## 2018-09-24 NOTE — TELEPHONE ENCOUNTER
----- Message from Kp Saldivar sent at 9/24/2018 12:05 PM EDT -----  Regarding: /Telephone  Pt called requesting a call back in regard to a earlier follow up appt . Pt stated she is experiencing migraines and shooting pain in neck when she turns her head. Pt best contact number is [phone].

## 2018-09-25 NOTE — TELEPHONE ENCOUNTER
Spoke with the patient, pt would like to have an earlier appointment advised would place patient on the cancellation list. Patient also asked for Dr. Aury Ford number advised that he is apart of the practice and because she is a patient of Dr. Cory Tovar not allow to see that provider. Patient understood and was okay with being on the cancellation list for another appointment.

## 2018-09-26 ENCOUNTER — HOSPITAL ENCOUNTER (OUTPATIENT)
Dept: GENERAL RADIOLOGY | Age: 43
Discharge: HOME | End: 2018-09-26
Payer: COMMERCIAL

## 2018-09-26 DIAGNOSIS — M54.2 CERVICALGIA: ICD-10-CM

## 2018-09-26 PROCEDURE — 72050 X-RAY EXAM NECK SPINE 4/5VWS: CPT

## 2018-10-02 RX ORDER — METFORMIN HYDROCHLORIDE 1000 MG/1
TABLET ORAL
Qty: 60 TAB | Refills: 4 | Status: SHIPPED | OUTPATIENT
Start: 2018-10-02 | End: 2019-04-18 | Stop reason: SDUPTHER

## 2018-10-05 ENCOUNTER — APPOINTMENT (OUTPATIENT)
Dept: CT IMAGING | Age: 43
End: 2018-10-05
Attending: EMERGENCY MEDICINE
Payer: COMMERCIAL

## 2018-10-05 ENCOUNTER — HOSPITAL ENCOUNTER (EMERGENCY)
Age: 43
Discharge: HOME | End: 2018-10-05
Attending: EMERGENCY MEDICINE
Payer: COMMERCIAL

## 2018-10-05 VITALS
RESPIRATION RATE: 16 BRPM | OXYGEN SATURATION: 95 % | WEIGHT: 222.44 LBS | BODY MASS INDEX: 40.93 KG/M2 | TEMPERATURE: 98.4 F | HEIGHT: 62 IN | HEART RATE: 82 BPM | DIASTOLIC BLOOD PRESSURE: 60 MMHG | SYSTOLIC BLOOD PRESSURE: 133 MMHG

## 2018-10-05 DIAGNOSIS — R51.9 CHRONIC INTRACTABLE HEADACHE, UNSPECIFIED HEADACHE TYPE: Primary | ICD-10-CM

## 2018-10-05 DIAGNOSIS — G89.29 CHRONIC INTRACTABLE HEADACHE, UNSPECIFIED HEADACHE TYPE: Primary | ICD-10-CM

## 2018-10-05 LAB
ALBUMIN SERPL-MCNC: 3.6 G/DL (ref 3.5–5)
ALBUMIN/GLOB SERPL: 0.9 {RATIO} (ref 1.1–2.2)
ALP SERPL-CCNC: 116 U/L (ref 45–117)
ALT SERPL-CCNC: 21 U/L (ref 12–78)
ANION GAP SERPL CALC-SCNC: 8 MMOL/L (ref 5–15)
APPEARANCE UR: ABNORMAL
AST SERPL-CCNC: 14 U/L (ref 15–37)
BACTERIA URNS QL MICRO: ABNORMAL /HPF
BASOPHILS # BLD: 0 K/UL (ref 0–0.1)
BASOPHILS NFR BLD: 0 % (ref 0–1)
BILIRUB SERPL-MCNC: 0.2 MG/DL (ref 0.2–1)
BILIRUB UR QL CFM: NEGATIVE
BUN SERPL-MCNC: 14 MG/DL (ref 6–20)
BUN/CREAT SERPL: 15 (ref 12–20)
CALCIUM SERPL-MCNC: 8.8 MG/DL (ref 8.5–10.1)
CAOX CRY URNS QL MICRO: ABNORMAL
CHLORIDE SERPL-SCNC: 112 MMOL/L (ref 97–108)
CO2 SERPL-SCNC: 22 MMOL/L (ref 21–32)
COLOR UR: ABNORMAL
CREAT SERPL-MCNC: 0.93 MG/DL (ref 0.55–1.02)
DIFFERENTIAL METHOD BLD: ABNORMAL
EOSINOPHIL # BLD: 0.1 K/UL (ref 0–0.4)
EOSINOPHIL NFR BLD: 2 % (ref 0–7)
EPITH CASTS URNS QL MICRO: ABNORMAL /LPF
ERYTHROCYTE [DISTWIDTH] IN BLOOD BY AUTOMATED COUNT: 15 % (ref 11.5–14.5)
GLOBULIN SER CALC-MCNC: 3.9 G/DL (ref 2–4)
GLUCOSE SERPL-MCNC: 93 MG/DL (ref 65–100)
GLUCOSE UR STRIP.AUTO-MCNC: NEGATIVE MG/DL
HCT VFR BLD AUTO: 45.9 % (ref 35–47)
HGB BLD-MCNC: 14.1 G/DL (ref 11.5–16)
HGB UR QL STRIP: NEGATIVE
IMM GRANULOCYTES # BLD: 0 K/UL (ref 0–0.04)
IMM GRANULOCYTES NFR BLD AUTO: 0 % (ref 0–0.5)
KETONES UR QL STRIP.AUTO: ABNORMAL MG/DL
LEUKOCYTE ESTERASE UR QL STRIP.AUTO: ABNORMAL
LYMPHOCYTES # BLD: 2 K/UL (ref 0.8–3.5)
LYMPHOCYTES NFR BLD: 28 % (ref 12–49)
MCH RBC QN AUTO: 27.3 PG (ref 26–34)
MCHC RBC AUTO-ENTMCNC: 30.7 G/DL (ref 30–36.5)
MCV RBC AUTO: 88.8 FL (ref 80–99)
MONOCYTES # BLD: 0.5 K/UL (ref 0–1)
MONOCYTES NFR BLD: 7 % (ref 5–13)
NEUTS SEG # BLD: 4.5 K/UL (ref 1.8–8)
NEUTS SEG NFR BLD: 63 % (ref 32–75)
NITRITE UR QL STRIP.AUTO: POSITIVE
NRBC # BLD: 0 K/UL (ref 0–0.01)
NRBC BLD-RTO: 0 PER 100 WBC
PH UR STRIP: 6 [PH] (ref 5–8)
PLATELET # BLD AUTO: 224 K/UL (ref 150–400)
PMV BLD AUTO: 11.2 FL (ref 8.9–12.9)
POTASSIUM SERPL-SCNC: 3.8 MMOL/L (ref 3.5–5.1)
PROT SERPL-MCNC: 7.5 G/DL (ref 6.4–8.2)
PROT UR STRIP-MCNC: ABNORMAL MG/DL
RBC # BLD AUTO: 5.17 M/UL (ref 3.8–5.2)
RBC #/AREA URNS HPF: ABNORMAL /HPF (ref 0–5)
SODIUM SERPL-SCNC: 142 MMOL/L (ref 136–145)
SP GR UR REFRACTOMETRY: 1.03 (ref 1–1.03)
UA: UC IF INDICATED,UAUC: ABNORMAL
UROBILINOGEN UR QL STRIP.AUTO: 1 EU/DL (ref 0.2–1)
WBC # BLD AUTO: 7.2 K/UL (ref 3.6–11)
WBC URNS QL MICRO: ABNORMAL /HPF (ref 0–4)

## 2018-10-05 PROCEDURE — 74011000258 HC RX REV CODE- 258: Performed by: EMERGENCY MEDICINE

## 2018-10-05 PROCEDURE — 74011250636 HC RX REV CODE- 250/636: Performed by: EMERGENCY MEDICINE

## 2018-10-05 PROCEDURE — 74011000250 HC RX REV CODE- 250: Performed by: EMERGENCY MEDICINE

## 2018-10-05 PROCEDURE — 74011250637 HC RX REV CODE- 250/637: Performed by: EMERGENCY MEDICINE

## 2018-10-05 PROCEDURE — 87077 CULTURE AEROBIC IDENTIFY: CPT | Performed by: EMERGENCY MEDICINE

## 2018-10-05 PROCEDURE — 87186 SC STD MICRODIL/AGAR DIL: CPT | Performed by: EMERGENCY MEDICINE

## 2018-10-05 PROCEDURE — 80053 COMPREHEN METABOLIC PANEL: CPT | Performed by: EMERGENCY MEDICINE

## 2018-10-05 PROCEDURE — 96361 HYDRATE IV INFUSION ADD-ON: CPT

## 2018-10-05 PROCEDURE — 70450 CT HEAD/BRAIN W/O DYE: CPT

## 2018-10-05 PROCEDURE — 96365 THER/PROPH/DIAG IV INF INIT: CPT

## 2018-10-05 PROCEDURE — 96375 TX/PRO/DX INJ NEW DRUG ADDON: CPT

## 2018-10-05 PROCEDURE — 99285 EMERGENCY DEPT VISIT HI MDM: CPT

## 2018-10-05 PROCEDURE — 87086 URINE CULTURE/COLONY COUNT: CPT | Performed by: EMERGENCY MEDICINE

## 2018-10-05 PROCEDURE — 81001 URINALYSIS AUTO W/SCOPE: CPT | Performed by: EMERGENCY MEDICINE

## 2018-10-05 PROCEDURE — 85025 COMPLETE CBC W/AUTO DIFF WBC: CPT | Performed by: EMERGENCY MEDICINE

## 2018-10-05 PROCEDURE — 36415 COLL VENOUS BLD VENIPUNCTURE: CPT | Performed by: EMERGENCY MEDICINE

## 2018-10-05 RX ORDER — PROCHLORPERAZINE EDISYLATE 5 MG/ML
10 INJECTION INTRAMUSCULAR; INTRAVENOUS
Status: DISCONTINUED | OUTPATIENT
Start: 2018-10-05 | End: 2018-10-05 | Stop reason: SDUPTHER

## 2018-10-05 RX ORDER — DEXAMETHASONE SODIUM PHOSPHATE 4 MG/ML
10 INJECTION, SOLUTION INTRA-ARTICULAR; INTRALESIONAL; INTRAMUSCULAR; INTRAVENOUS; SOFT TISSUE
Status: COMPLETED | OUTPATIENT
Start: 2018-10-05 | End: 2018-10-05

## 2018-10-05 RX ORDER — ACETAZOLAMIDE 250 MG/1
250 TABLET ORAL
Status: COMPLETED | OUTPATIENT
Start: 2018-10-05 | End: 2018-10-05

## 2018-10-05 RX ORDER — ACETAZOLAMIDE 250 MG/1
250 TABLET ORAL 2 TIMES DAILY
Qty: 28 TAB | Refills: 0 | Status: SHIPPED | OUTPATIENT
Start: 2018-10-05 | End: 2018-10-19

## 2018-10-05 RX ORDER — ONDANSETRON 4 MG/1
4 TABLET, ORALLY DISINTEGRATING ORAL
Qty: 16 TAB | Refills: 0 | Status: SHIPPED | OUTPATIENT
Start: 2018-10-05 | End: 2019-02-13 | Stop reason: SDUPTHER

## 2018-10-05 RX ORDER — DIPHENHYDRAMINE HYDROCHLORIDE 50 MG/ML
25 INJECTION, SOLUTION INTRAMUSCULAR; INTRAVENOUS
Status: COMPLETED | OUTPATIENT
Start: 2018-10-05 | End: 2018-10-05

## 2018-10-05 RX ADMIN — DIPHENHYDRAMINE HYDROCHLORIDE 25 MG: 50 INJECTION, SOLUTION INTRAMUSCULAR; INTRAVENOUS at 18:29

## 2018-10-05 RX ADMIN — ACETAZOLAMIDE 250 MG: 250 TABLET ORAL at 22:09

## 2018-10-05 RX ADMIN — CEFTRIAXONE 1 G: 1 INJECTION, POWDER, FOR SOLUTION INTRAMUSCULAR; INTRAVENOUS at 19:54

## 2018-10-05 RX ADMIN — DEXAMETHASONE SODIUM PHOSPHATE 10 MG: 4 INJECTION, SOLUTION INTRAMUSCULAR; INTRAVENOUS at 18:29

## 2018-10-05 RX ADMIN — SODIUM CHLORIDE 1000 ML: 900 INJECTION, SOLUTION INTRAVENOUS at 18:28

## 2018-10-05 RX ADMIN — SODIUM CHLORIDE 10 MG: 9 INJECTION INTRAMUSCULAR; INTRAVENOUS; SUBCUTANEOUS at 18:28
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
## 2018-10-05 NOTE — ED PROVIDER NOTES
EMERGENCY DEPARTMENT HISTORY AND PHYSICAL EXAM      Date: 10/5/2018  Patient Name: Vega Segovia    History of Presenting Illness     Chief Complaint   Patient presents with    Headache     ambulatory to triage with steady gait, started Tuesday, hx of cerebral tumor, blurred vision, dizziness, pt stated usually she goes to her neurologist and is rx'd percocet, but no appointments were available to next year    Vomiting     vomitting blood and pills fom this morning       History Provided By: Patient    HPI: Vega Segovia, 38 y.o. female with PMHx significant for asthma, HTN, DM, fibromyalgia, migraines, cerebral tumor, seizures, presents ambulatory to the ED with cc of persistent occipital HA with associated dizziness, lightheadedness, generalized weakness, photophobia, nausea and BRB hematemesis x 10/2/2018. Pt is currently followed by Dr. Alvina Zaragoza, neurology. She endorses having a cerebral tumor that they are currently trying to shrink and notes that she has been taking her rx'd oxycodone for pain management. Pt began vomiting blood this morning and was unable to be seen by her PCP, so she came into the ED [facility] ED. She additionally reports that she is unable to get an appointment with Dr. Alvina Zaragoza until January. Pt does have hx of bleeding ulcers and does not take any NSAIDS. She has not had problems with ulcers in a long time. Denies abd pain, CP, SOB. Pt had a CT head in April 2018 when she was seen in the ED [facility] ED for HA, which was WNL. There are no other complaints, changes, or physical findings at this time. PCP: Nadeem Andrews MD    Current Outpatient Prescriptions   Medication Sig Dispense Refill    acetaZOLAMIDE (DIAMOX) 250 mg tablet Take 1 Tab by mouth two (2) times a day for 14 days. 28 Tab 0    ondansetron (ZOFRAN ODT) 4 mg disintegrating tablet Take 1 Tab by mouth every eight (8) hours as needed for Nausea.  16 Tab 0    metFORMIN (GLUCOPHAGE) 1,000 mg tablet take 1 tablet by mouth twice a day with MORNING AND EVENING MEALS 60 Tab 4    zonisamide (ZONEGRAN) 100 mg capsule take 2 capsules by mouth twice a day 120 Cap 3    QUEtiapine (SEROQUEL) 50 mg tablet Take 1 Tab by mouth nightly. 30 Tab 4    Syringe-Needle, Safety,Disp Un 3 mL 25 gauge x 1\" syrg Use as directed 10 Pen Needle 1    zonisamide (ZONEGRAN) 100 mg capsule take 2 capsules by mouth twice a day 120 Cap 3    fluticasone-salmeterol (ADVAIR DISKUS) 250-50 mcg/dose diskus inhaler Take 1 Puff by inhalation every twelve (12) hours.  LYRICA 100 mg capsule take 1 capsule by mouth twice a day **MAX DAILY AMOUNT: 200MG 60 Cap 3    methocarbamol (ROBAXIN) 750 mg tablet Take  by mouth four (4) times daily.  acyclovir (ZOVIRAX) 200 mg capsule Take 200 mg by mouth two (2) times a day.  atenolol (TENORMIN) 100 mg tablet Take 100 mg by mouth two (2) times a day.  diphenhydrAMINE (BENADRYL) 25 mg capsule Take 25 mg by mouth as needed. Indications:  [facility] - only takes with evette      FLUoxetine (PROZAC) 40 mg capsule Take one cap by mouth daily 30 Cap 11    DIGITEK 125 mcg tablet take 1 tablet by mouth once daily 30 Tab 3       Past History     Past Medical History:  Past Medical History:   Diagnosis Date    Asthma     Diabetes ([facility] [address]     Fibromyalgia     Hypertension     Lower back injury 02/13/2017    Migraines     Nausea and vomiting 1/14/2014    Other ill-defined conditions(799.89)     cerebral tumor, endometriosis, fibromyalgia, herpes    Right lower quadrant abdominal mass 1/14/2014    Seizures ([facility] [address]     Sleep apnea     intolerant to CPAP    Unspecified adverse effect of anesthesia     pt states that she has an asthma attack when coming out of  anesthesia       Past Surgical History:  Past Surgical History:   Procedure Laterality Date    HX CHOLECYSTECTOMY      HX COLECTOMY  3/14/2013    Rt colectomy for perf transverse diverticulum    HX HEENT      wisdom teeth    HX OTHER SURGICAL      laproscopy on ovaries    HX OTHER SURGICAL      left wrist ganglion cystectomy    HX OTHER SURGICAL      lung mass removed       Family History:  Family History   Problem Relation Age of Onset    Other Other      unable to obtain due to AMS    Hypertension Father     Heart Disease Father     Cancer Father     Hypertension Mother     Diabetes Maternal Grandmother     Diabetes Maternal Grandfather        Social History:  Social History   Substance Use Topics    Smoking status: Never Smoker    Smokeless tobacco: Never Used    Alcohol use Yes      Comment: ocasionally       Allergies: Allergies   Allergen Reactions    Lemon Anaphylaxis    Adhesive Tape-Silicones Other (comments)     Pt reports it burns her skin    Dilaudid [Hydromorphone (Pf)] Itching    Fentanyl Shortness of Breath    Percocet [Oxycodone-Acetaminophen] Unknown (comments)     Pt states not allergic         Review of Systems   Review of Systems   Constitutional: Negative for fatigue and fever. HENT: Negative. Eyes: Positive for photophobia. Respiratory: Negative for shortness of breath and wheezing. Cardiovascular: Negative for chest pain and leg swelling. Gastrointestinal: Positive for nausea and vomiting (BRB hematemesis). Negative for abdominal pain, blood in stool, constipation and diarrhea. Endocrine: Negative. Genitourinary: Negative for difficulty urinating and dysuria. Musculoskeletal: Negative. Skin: Negative for rash. Allergic/Immunologic: Negative. Neurological: Positive for dizziness, weakness (generalized) and headaches (occipital). Negative for numbness. Hematological: Negative. Psychiatric/Behavioral: Negative. Physical Exam   Physical Exam   Constitutional: She is oriented to person, place, and time. She appears well-developed and well-nourished. No distress. HENT:   Head: Normocephalic and atraumatic.    Mouth/Throat: Oropharynx is clear and moist.   Eyes: Conjunctivae and EOM are normal.   Neck: Neck supple. No JVD present. No tracheal deviation present. Cardiovascular: Normal rate, regular rhythm and intact distal pulses. Exam reveals no gallop and no friction rub. No murmur heard. Pulmonary/Chest: Effort normal and breath sounds normal. No stridor. No respiratory distress. She has no wheezes. Abdominal: Soft. Bowel sounds are normal. She exhibits no distension and no mass. There is no tenderness. There is no guarding. Musculoskeletal: Normal range of motion. She exhibits no edema or tenderness. No deformity   Neurological: She is alert and oriented to person, place, and time. She has normal strength. No focal deficits, moving all extremities, ambulatory    Skin: Skin is warm, dry and intact. No rash noted. Psychiatric: She has a normal mood and affect. Her behavior is normal. Judgment and thought content normal.   Nursing note and vitals reviewed.       Diagnostic Study Results     Labs -     Recent Results (from the past 12 hour(s))   URINALYSIS W/ REFLEX CULTURE    Collection Time: 10/05/18  3:53 PM   Result Value Ref Range    Color YELLOW/STRAW      Appearance TURBID (A) CLEAR      Specific gravity 1.026 1.003 - 1.030      pH (UA) 6.0 5.0 - 8.0      Protein TRACE (A) NEG mg/dL    Glucose NEGATIVE  NEG mg/dL    Ketone TRACE (A) NEG mg/dL    Blood NEGATIVE  NEG      Urobilinogen 1.0 0.2 - 1.0 EU/dL    Nitrites POSITIVE (A) NEG      Leukocyte Esterase SMALL (A) NEG      WBC 10-20 0 - 4 /hpf    RBC 0-5 0 - 5 /hpf    Epithelial cells MANY (A) FEW /lpf    Bacteria 4+ (A) NEG /hpf    UA:UC IF INDICATED URINE CULTURE ORDERED (A) CNI      CA Oxalate crystals FEW (A) NEG     BILIRUBIN, CONFIRM    Collection Time: 10/05/18  3:53 PM   Result Value Ref Range    Bilirubin UA, confirm NEGATIVE  NEG     CBC WITH AUTOMATED DIFF    Collection Time: 10/05/18  6:19 PM   Result Value Ref Range    WBC 7.2 3.6 - 11.0 K/uL    RBC 5.17 3.80 - 5.20 M/uL    HGB 14.1 11.5 - 16.0 g/dL    HCT 45.9 35.0 - 47.0 %    MCV 88.8 80.0 - 99.0 FL    MCH 27.3 26.0 - 34.0 PG    MCHC 30.7 30.0 - 36.5 g/dL    RDW 15.0 (H) 11.5 - 14.5 %    PLATELET 585 880 - 961 K/uL    MPV 11.2 8.9 - 12.9 FL    NRBC 0.0 0  WBC    ABSOLUTE NRBC 0.00 0.00 - 0.01 K/uL    NEUTROPHILS 63 32 - 75 %    LYMPHOCYTES 28 12 - 49 %    MONOCYTES 7 5 - 13 %    EOSINOPHILS 2 0 - 7 %    BASOPHILS 0 0 - 1 %    IMMATURE GRANULOCYTES 0 0.0 - 0.5 %    ABS. NEUTROPHILS 4.5 1.8 - 8.0 K/UL    ABS. LYMPHOCYTES 2.0 0.8 - 3.5 K/UL    ABS. MONOCYTES 0.5 0.0 - 1.0 K/UL    ABS. EOSINOPHILS 0.1 0.0 - 0.4 K/UL    ABS. BASOPHILS 0.0 0.0 - 0.1 K/UL    ABS. IMM. GRANS. 0.0 0.00 - 0.04 K/UL    DF AUTOMATED     METABOLIC PANEL, COMPREHENSIVE    Collection Time: 10/05/18  6:19 PM   Result Value Ref Range    Sodium 142 136 - 145 mmol/L    Potassium 3.8 3.5 - 5.1 mmol/L    Chloride 112 (H) 97 - 108 mmol/L    CO2 22 21 - 32 mmol/L    Anion gap 8 5 - 15 mmol/L    Glucose 93 65 - 100 mg/dL    BUN 14 6 - 20 MG/DL    Creatinine 0.93 0.55 - 1.02 MG/DL    BUN/Creatinine ratio 15 12 - 20      GFR est AA >60 >60 ml/min/1.73m2    GFR est non-AA >60 >60 ml/min/1.73m2    Calcium 8.8 8.5 - 10.1 MG/DL    Bilirubin, total 0.2 0.2 - 1.0 MG/DL    ALT (SGPT) 21 12 - 78 U/L    AST (SGOT) 14 (L) 15 - 37 U/L    Alk. phosphatase 116 45 - 117 U/L    Protein, total 7.5 6.4 - 8.2 g/dL    Albumin 3.6 3.5 - 5.0 g/dL    Globulin 3.9 2.0 - 4.0 g/dL    A-G Ratio 0.9 (L) 1.1 - 2.2         Radiologic Studies -   CT HEAD WO CONT   Final Result        CT Results  (Last 48 hours)               10/05/18 1910  CT HEAD WO CONT Final result    Impression:  IMPRESSION:  No significant abnormalities. Narrative:  EXAMINATION:  CT HEAD WO CONT       CLINICAL INFORMATION:  Headache, blurred vision, dizziness, vomiting this   morning   COMPARISON:  4/18/2018    TECHNIQUE: Routine axial head CT was performed. IV contrast was not   administered.    Sagittal and coronal reconstructions were generated. CT dose reduction was achieved through use of a standardized protocol tailored   for this examination and automatic exposure control for dose modulation. Images   of the skull base are degraded by artifact from the patient's jewelry which she   was unable to remove. FINDINGS:   No acute infarct, hemorrhage or mass. VENTRICULAR SYSTEM:  Normal for age. BASAL CISTERNS:  Patent. BRAIN PARENCHYMA:  No significant abnormalities. MIDLINE SHIFT:  None. CALVARIUM/ SKULL BASE: Intact. PARANASAL SINUSES AND MASTOID AIR CELLS: Clear. VISUALIZED ORBITS: No significant abnormalities. SELLA: No enlargement. CXR Results  (Last 48 hours)    None            Medical Decision Making   I am the first provider for this patient. I reviewed the vital signs, available nursing notes, past medical history, past surgical history, family history and social history. Vital Signs-Reviewed the patient's vital signs. Patient Vitals for the past 12 hrs:   Temp Pulse Resp BP SpO2   10/05/18 2200 - - - - 95 %   10/05/18 1925 - - - 133/60 99 %   10/05/18 1815 - - - 129/72 100 %   10/05/18 1800 - - - 148/65 99 %   10/05/18 1745 - - - 132/68 97 %   10/05/18 1730 - - - 124/74 99 %   10/05/18 1724 - - - - 99 %   10/05/18 1715 - - - 117/76 97 %   10/05/18 1544 98.4 °F (36.9 °C) 82 16 124/76 100 %       Records Reviewed: Nursing Notes and Old Medical Records    Provider Notes (Medical Decision Making):   DDx: chronic migraines, tension HA, cluster HA, cerebral edema, pseudotumor cerebri    Pt with no neurological deficits. Head CT 4/2018 WNL. Will treat symptomatically, recheck head CT and consult neuro. ED Course:   Initial assessment performed. The patients presenting problems have been discussed, and they are in agreement with the care plan formulated and outlined with them. I have encouraged them to ask questions as they arise throughout their visit.     CONSULT NOTE:   8:12 PM  Nacny Noble DO spoke with Dr. Sincere Cruz,   Specialty: Neurology  Discussed pt's hx, disposition, and available diagnostic and imaging results. Reviewed care plans. Consultant would like to restart diamox 250mg BID and have her call the office on Monday for f/u with Dr. Junior Mason. Written by Idalmis Vaughan ED Scribe, as dictated by Nancy Noble DO.    8:48 PM  JIANG that has not improved, updated on plan of care. Written by Idalmis Vaughan ED Scribe as dictated by Nancy Noble DO    Disposition:  DISCHARGE NOTE:  9:15 PM  Pt has been reexamined. Pt has no new complaints, changes, or physical findings. Care plan outlined and precautions discussed. All available results reviewed with pt. All medications reviewed with pt. All of pts questions and concerns addressed. Pt agrees to f/u as instructed and agrees to return to ED upon further deterioration. Pt is ready to go home. Written by Idalmis Vaughan ED Scribe as dictated by Nancy Noble DO    PLAN:  1. Discharge Medication List as of 10/5/2018  9:07 PM      START taking these medications    Details   acetaZOLAMIDE (DIAMOX) 250 mg tablet Take 1 Tab by mouth two (2) times a day for 14 days. , Normal, Disp-28 Tab, R-0      ondansetron (ZOFRAN ODT) 4 mg disintegrating tablet Take 1 Tab by mouth every eight (8) hours as needed for Nausea., Normal, Disp-16 Tab, R-0         CONTINUE these medications which have NOT CHANGED    Details   metFORMIN (GLUCOPHAGE) 1,000 mg tablet take 1 tablet by mouth twice a day with MORNING AND EVENING MEALS, Normal, Disp-60 Tab, R-4      !! zonisamide (ZONEGRAN) 100 mg capsule take 2 capsules by mouth twice a day, Mail Order, Disp-120 Cap, R-3      QUEtiapine (SEROQUEL) 50 mg tablet Take 1 Tab by mouth nightly., Normal, Disp-30 Tab, R-4      Syringe-Needle, Safety,Disp Un 3 mL 25 gauge x 1\" syrg Use as directed, Normal, Disp-10 Pen Needle, R-1      !! zonisamide (ZONEGRAN) 100 mg capsule take 2 capsules by mouth twice a day, Normal, Disp-120 Cap, R-3      fluticasone-salmeterol (ADVAIR DISKUS) 250-50 mcg/dose diskus inhaler Take 1 Puff by inhalation every twelve (12) hours. , Historical Med      LYRICA 100 mg capsule take 1 capsule by mouth twice a day **MAX DAILY AMOUNT: 200MG, Print, Disp-60 Cap, R-3      methocarbamol (ROBAXIN) 750 mg tablet Take  by mouth four (4) times daily. , Historical Med      acyclovir (ZOVIRAX) 200 mg capsule Take 200 mg by mouth two (2) times a day.  , Historical Med      atenolol (TENORMIN) 100 mg tablet Take 100 mg by mouth two (2) times a day.  , Historical Med      diphenhydrAMINE (BENADRYL) 25 mg capsule Take 25 mg by mouth as needed. Indications: [facility]c to [facility] - only takes with evette, Historical Med      FLUoxetine (PROZAC) 40 mg capsule Take one cap by mouth daily, Normal, Disp-30 Cap, R-11      DIGITEK 125 mcg tablet take 1 tablet by mouth once daily, Mail Order, Disp-30 Tab, R-3       !! - Potential duplicate medications found. Please discuss with provider. STOP taking these medications       promethazine (PHENERGAN) 25 mg tablet Comments:   Reason for Stopping:         acetaZOLAMIDE SR (DIAMOX) 500 mg capsule Comments:   Reason for Stopping:         oxyCODONE IR (ROXICODONE) 5 mg immediate release tablet Comments:   Reason for Stopping:         ondansetron hcl (ZOFRAN, AS HYDROCHLORIDE,) 4 mg tablet Comments:   Reason for Stoppin.   Follow-up Information     Follow up With Details Comments Contact Info    Bobby Oliva MD Schedule an appointment as soon as possible for a visit in 3 days  [address] [facility]  [address]  [facility]  [phone]          Return to ED if worse     Diagnosis     Clinical Impression:   1. Chronic intractable headache, unspecified headache type        Attestations:     This note is prepared by Elza Raphael acting as scribe for Colin Manjarrez DO: The scribe's documentation has been prepared under my direction and personally reviewed by me in its entirety. I confirm that the note above accurately reflects all work, treatment, procedures, and medical decision making performed by me.

## 2018-10-06 NOTE — DISCHARGE INSTRUCTIONS

## 2018-10-06 NOTE — ED NOTES
Discharge instructions reviewed with pt. Discharge instructions given to pt per Dr. Yosef Alfonso. Pt able to return/verbalize discharge instructions. Copy of discharge instructions given. RX given to pt. Pt condition stable, respiratory status within normal limits, neuro status intact. Pt ambulatory out of ER.

## 2018-10-07 LAB
BACTERIA SPEC CULT: ABNORMAL
CC UR VC: ABNORMAL
SERVICE CMNT-IMP: ABNORMAL

## 2018-10-07 RX ORDER — CEPHALEXIN 500 MG/1
500 CAPSULE ORAL 3 TIMES DAILY
Qty: 21 CAP | Refills: 0 | Status: SHIPPED | OUTPATIENT
Start: 2018-10-07 | End: 2018-10-14

## 2018-11-06 ENCOUNTER — OFFICE VISIT (OUTPATIENT)
Dept: NEUROLOGY | Age: 43
End: 2018-11-06

## 2018-11-06 VITALS
DIASTOLIC BLOOD PRESSURE: 82 MMHG | SYSTOLIC BLOOD PRESSURE: 130 MMHG | WEIGHT: 223 LBS | HEIGHT: 62 IN | BODY MASS INDEX: 41.04 KG/M2 | HEART RATE: 92 BPM | OXYGEN SATURATION: 96 %

## 2018-11-06 DIAGNOSIS — M79.7 FIBROMYALGIA: ICD-10-CM

## 2018-11-06 DIAGNOSIS — F51.05 INSOMNIA DUE TO OTHER MENTAL DISORDER: ICD-10-CM

## 2018-11-06 DIAGNOSIS — R11.0 NAUSEA: ICD-10-CM

## 2018-11-06 DIAGNOSIS — J45.40 MODERATE PERSISTENT ASTHMA WITHOUT COMPLICATION: ICD-10-CM

## 2018-11-06 DIAGNOSIS — G43.711 INTRACTABLE CHRONIC MIGRAINE WITHOUT AURA AND WITH STATUS MIGRAINOSUS: ICD-10-CM

## 2018-11-06 DIAGNOSIS — G93.2 PSEUDOTUMOR CEREBRI: ICD-10-CM

## 2018-11-06 DIAGNOSIS — G43.711 CHRONIC MIGRAINE WITHOUT AURA, WITH INTRACTABLE MIGRAINE, SO STATED, WITH STATUS MIGRAINOSUS: Primary | ICD-10-CM

## 2018-11-06 DIAGNOSIS — F51.01 PRIMARY INSOMNIA: ICD-10-CM

## 2018-11-06 DIAGNOSIS — F99 INSOMNIA DUE TO OTHER MENTAL DISORDER: ICD-10-CM

## 2018-11-06 RX ORDER — ACETAZOLAMIDE 250 MG/1
TABLET ORAL
Qty: 60 TAB | Refills: 3 | Status: SHIPPED | OUTPATIENT
Start: 2018-11-06 | End: 2019-03-06 | Stop reason: SDUPTHER

## 2018-11-06 RX ORDER — CYANOCOBALAMIN 1000 UG/ML
INJECTION, SOLUTION INTRAMUSCULAR; SUBCUTANEOUS
Qty: 8 VIAL | Refills: 3 | Status: SHIPPED | OUTPATIENT
Start: 2018-11-06 | End: 2019-02-25 | Stop reason: SDUPTHER

## 2018-11-06 RX ORDER — ZONISAMIDE 100 MG/1
CAPSULE ORAL
Qty: 120 CAP | Refills: 3 | Status: SHIPPED | OUTPATIENT
Start: 2018-11-06 | End: 2019-03-06 | Stop reason: SDUPTHER

## 2018-11-06 RX ORDER — PREGABALIN 150 MG/1
150 CAPSULE ORAL 2 TIMES DAILY
Qty: 60 CAP | Refills: 3 | Status: SHIPPED | OUTPATIENT
Start: 2018-11-06 | End: 2019-05-01 | Stop reason: SDUPTHER

## 2018-11-06 RX ORDER — QUETIAPINE FUMARATE 50 MG/1
50 TABLET, FILM COATED ORAL
Qty: 30 TAB | Refills: 4 | Status: SHIPPED | OUTPATIENT
Start: 2018-11-06 | End: 2019-03-06 | Stop reason: SDUPTHER

## 2018-11-06 NOTE — PATIENT INSTRUCTIONS
Reduce diamox to 250mg twice daily    Take aimovig 140mg monthly    Buy 5000 [employer] units of vitamin D3 daily
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
## 2018-11-13 ENCOUNTER — TELEPHONE (OUTPATIENT)
Dept: NEUROLOGY | Age: 43
End: 2018-11-13

## 2018-12-20 DIAGNOSIS — F51.01 PRIMARY INSOMNIA: ICD-10-CM

## 2018-12-20 RX ORDER — QUETIAPINE FUMARATE 50 MG/1
TABLET, FILM COATED ORAL
Qty: 30 TAB | Refills: 4 | Status: SHIPPED | OUTPATIENT
Start: 2018-12-20 | End: 2019-03-06 | Stop reason: SDUPTHER

## 2019-01-16 ENCOUNTER — HOSPITAL ENCOUNTER (OUTPATIENT)
Dept: ULTRASOUND IMAGING | Age: 44
Discharge: HOME | End: 2019-01-16
Payer: COMMERCIAL

## 2019-01-16 DIAGNOSIS — R10.9 ABDOMINAL PAIN: ICD-10-CM

## 2019-01-16 PROCEDURE — 76700 US EXAM ABDOM COMPLETE: CPT

## 2019-02-13 RX ORDER — ONDANSETRON 4 MG/1
4 TABLET, ORALLY DISINTEGRATING ORAL
Qty: 16 TAB | Refills: 5 | Status: SHIPPED | OUTPATIENT
Start: 2019-02-13 | End: 2019-03-06 | Stop reason: SDUPTHER

## 2019-02-13 NOTE — TELEPHONE ENCOUNTER
Future Appointments   Date Time Provider Blake Soi   3/6/2019  8:40 AM Lucinda Mcduffie MD [address] [place] [address]                         Last Appointment My Department:  1/17/2019    Would you like to refill below? Requested Prescriptions     Pending Prescriptions Disp Refills    ondansetron (ZOFRAN ODT) 4 mg disintegrating tablet 16 Tab 0     Sig: Take 1 Tab by mouth every eight (8) hours as needed for Nausea.

## 2019-02-25 RX ORDER — CYANOCOBALAMIN 1000 UG/ML
INJECTION, SOLUTION INTRAMUSCULAR; SUBCUTANEOUS
Qty: 8 VIAL | Refills: 3
Start: 2019-02-25 | End: 2019-03-06 | Stop reason: SDUPTHER

## 2019-02-25 NOTE — TELEPHONE ENCOUNTER
* Received a fax request for this refill*    Future Appointments   Date Time Provider Blake Ayers   3/6/2019  8:40 AM Magdalena Magallon MD [address] [place] [address]                         Last Appointment My Department:  1/17/2019    Would you like to refill below?     Requested Prescriptions     Pending Prescriptions Disp Refills    cyanocobalamin (VITAMIN B-12) 1,000 mcg/mL injection 8 Vial 3     Sig: Intramuscular injection of 1000 mcg every week for 4 weeks then every month

## 2019-03-06 ENCOUNTER — OFFICE VISIT (OUTPATIENT)
Dept: NEUROLOGY | Age: 44
End: 2019-03-06

## 2019-03-06 VITALS
HEART RATE: 77 BPM | HEIGHT: 62 IN | WEIGHT: 216 LBS | DIASTOLIC BLOOD PRESSURE: 62 MMHG | SYSTOLIC BLOOD PRESSURE: 110 MMHG | BODY MASS INDEX: 39.75 KG/M2

## 2019-03-06 DIAGNOSIS — R11.0 NAUSEA: ICD-10-CM

## 2019-03-06 DIAGNOSIS — E53.8 B12 DEFICIENCY: ICD-10-CM

## 2019-03-06 DIAGNOSIS — G43.711 INTRACTABLE CHRONIC MIGRAINE WITHOUT AURA AND WITH STATUS MIGRAINOSUS: ICD-10-CM

## 2019-03-06 DIAGNOSIS — G93.2 PSEUDOTUMOR CEREBRI SYNDROME: Primary | ICD-10-CM

## 2019-03-06 DIAGNOSIS — F51.01 PRIMARY INSOMNIA: ICD-10-CM

## 2019-03-06 RX ORDER — ONDANSETRON 4 MG/1
4 TABLET, ORALLY DISINTEGRATING ORAL
Qty: 16 TAB | Refills: 5 | Status: SHIPPED | OUTPATIENT
Start: 2019-03-06 | End: 2019-05-01 | Stop reason: SDUPTHER

## 2019-03-06 RX ORDER — CYANOCOBALAMIN 1000 UG/ML
INJECTION, SOLUTION INTRAMUSCULAR; SUBCUTANEOUS
Qty: 8 VIAL | Refills: 3
Start: 2019-03-06 | End: 2019-05-01 | Stop reason: SDUPTHER

## 2019-03-06 RX ORDER — ACETAZOLAMIDE 250 MG/1
TABLET ORAL
Qty: 60 TAB | Refills: 3 | Status: SHIPPED | OUTPATIENT
Start: 2019-03-06 | End: 2019-04-29

## 2019-03-06 RX ORDER — LAMOTRIGINE 25 MG/1
25 TABLET ORAL DAILY
Qty: 60 TAB | Refills: 3 | Status: SHIPPED | OUTPATIENT
Start: 2019-03-06 | End: 2019-05-01 | Stop reason: SDUPTHER

## 2019-03-06 RX ORDER — ZONISAMIDE 100 MG/1
CAPSULE ORAL
Qty: 120 CAP | Refills: 3 | Status: SHIPPED | OUTPATIENT
Start: 2019-03-06 | End: 2021-06-07

## 2019-03-06 RX ORDER — QUETIAPINE FUMARATE 50 MG/1
TABLET, FILM COATED ORAL
Qty: 30 TAB | Refills: 4 | Status: SHIPPED | OUTPATIENT
Start: 2019-03-06 | End: 2019-05-01 | Stop reason: SDUPTHER

## 2019-03-06 NOTE — PATIENT INSTRUCTIONS
1. Stop acetazolamide one week before the spinal tap  2  stop zonisamide after the spinal tap  3. Star lamictal at 25 mg once a day for two weeks then twice a day until I see you    A Healthy Lifestyle: Care Instructions  Your Care Instructions    A healthy lifestyle can help you feel good, stay at a healthy weight, and have plenty of energy for both work and play. A healthy lifestyle is something you can share with your whole family. A healthy lifestyle also can lower your risk for serious health problems, such as high blood pressure, heart disease, and diabetes. You can follow a few steps listed below to improve your health and the health of your family. Follow-up care is a key part of your treatment and safety. Be sure to make and go to all appointments, and call your doctor if you are having problems. It's also a good idea to know your test results and keep a list of the medicines you take. How can you care for yourself at home? · Do not eat too much sugar, fat, or fast foods. You can still have dessert and treats now and then. The goal is moderation. · Start small to improve your eating habits. Pay attention to portion sizes, drink less juice and soda pop, and eat more fruits and vegetables. ? Eat a healthy amount of food. A 3-ounce serving of meat, for example, is about the size of a deck of cards. Fill the rest of your plate with vegetables and whole grains. ? Limit the amount of soda and sports drinks you have every day. Drink more water when you are thirsty. ? Eat at least 5 servings of fruits and vegetables every day. It may seem like a lot, but it is not hard to reach this goal. A serving or helping is 1 piece of fruit, 1 cup of vegetables, or 2 cups of leafy, raw vegetables. Have an apple or some carrot sticks as an afternoon snack instead of a candy bar. Try to have fruits and/or vegetables at every meal.  · Make exercise part of your daily routine.  You may want to start with simple activities, such as walking, bicycling, or slow swimming. Try to be active 30 to 60 minutes every day. You do not need to do all 30 to 60 minutes all at once. For example, you can exercise 3 times a day for 10 or 20 minutes. Moderate exercise is safe for most people, but it is always a good idea to talk to your doctor before starting an exercise program.  · Keep moving. Margarito Cue the lawn, work in the garden, or [employer]. Take the stairs instead of the elevator at work. · If you smoke, quit. People who smoke have an increased risk for heart attack, stroke, cancer, and other lung illnesses. Quitting is hard, but there are ways to boost your chance of quitting tobacco for good. ? Use nicotine gum, patches, or lozenges. ? Ask your doctor about stop-smoking programs and medicines. ? Keep trying. In addition to reducing your risk of diseases in the future, you will notice some benefits soon after you stop using tobacco. If you have shortness of breath or asthma symptoms, they will likely get better within a few weeks after you quit. · Limit how much alcohol you drink. Moderate amounts of alcohol (up to 2 drinks a day for men, 1 drink a day for women) are okay. But drinking too much can lead to liver problems, high blood pressure, and other health problems. Family health  If you have a family, there are many things you can do together to improve your health. · Eat meals together as a family as often as possible. · Eat healthy foods. This includes fruits, vegetables, lean meats and dairy, and whole grains. · Include your family in your fitness plan. Most people think of activities such as jogging or tennis as the way to fitness, but there are many ways you and your family can be more active. Anything that makes you breathe hard and gets your heart pumping is exercise.  Here are some tips:  ? Walk to do errands or to take your child to school or the bus.  ? Go for a family bike ride after dinner instead of watching TV.  Where can you learn more? Go to http://dusty-ari.info/. Enter R866 in the search box to learn more about \"A Healthy Lifestyle: Care Instructions. \"  Current as of: September 11, 2018  Content Version: 11.9  © 9441-5995 [employer]. Care instructions adapted under license by [employer] (which disclaims liability or warranty for this information). If you have questions about a medical condition or this instruction, always ask your healthcare professional. [place] [address] any warranty or liability for your use of this information. Office Policies      Paperwork            Any paperwork that needs to be completed has a 3 WEEK turnaround time. Phone calls/patient messages:    · Please allow up to 24 hours for someone in the office to contact you about your call or message. Be mindful your provider may be out of the office or your message may require further review. We encourage you to use [employer] for your messages as this is a faster, more efficient way to communicate with our office           Medication Refills:    · Prescription medications require up to 48 business hours to process. We encourage you to use [employer] for your refills. · For controlled medications: Please allow up to 72 business hours to process. Certain medications may require you to  a written prescription at our office.   · NO narcotic/controlled medications will be prescribed after 4pm Monday through Friday or on weekends
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
## 2019-03-12 DIAGNOSIS — G43.711 INTRACTABLE CHRONIC MIGRAINE WITHOUT AURA AND WITH STATUS MIGRAINOSUS: ICD-10-CM

## 2019-03-13 RX ORDER — ACETAZOLAMIDE 250 MG/1
TABLET ORAL
Qty: 60 TAB | Refills: 3 | Status: SHIPPED | OUTPATIENT
Start: 2019-03-13 | End: 2019-05-01 | Stop reason: ALTCHOICE

## 2019-03-13 RX ORDER — ZONISAMIDE 100 MG/1
CAPSULE ORAL
Qty: 120 CAP | Refills: 3 | Status: SHIPPED | OUTPATIENT
Start: 2019-03-13 | End: 2019-05-01 | Stop reason: SDUPTHER

## 2019-03-19 ENCOUNTER — HOSPITAL ENCOUNTER (OUTPATIENT)
Dept: GENERAL RADIOLOGY | Age: 44
Discharge: HOME | End: 2019-03-19
Attending: PSYCHIATRY & NEUROLOGY
Payer: COMMERCIAL

## 2019-03-19 VITALS
BODY MASS INDEX: 39.75 KG/M2 | DIASTOLIC BLOOD PRESSURE: 66 MMHG | RESPIRATION RATE: 20 BRPM | SYSTOLIC BLOOD PRESSURE: 125 MMHG | HEIGHT: 62 IN | HEART RATE: 58 BPM | OXYGEN SATURATION: 95 % | TEMPERATURE: 97.8 F | WEIGHT: 216 LBS

## 2019-03-19 DIAGNOSIS — G93.2 PSEUDOTUMOR CEREBRI SYNDROME: ICD-10-CM

## 2019-03-19 LAB
APPEARANCE CSF: CLEAR
COLOR CSF: COLORLESS
COMMENT, HOLDF: NORMAL
GLUCOSE CSF-MCNC: 53 MG/DL (ref 40–70)
PROT CSF-MCNC: 43 MG/DL (ref 15–45)
RBC # CSF: 133 /CU MM
SAMPLES BEING HELD,HOLD: NORMAL
TUBE # CSF: ABNORMAL
TUBE # CSF: NORMAL
TUBE # CSF: NORMAL
WBC # CSF: 0 /CU MM (ref 0–5)

## 2019-03-19 PROCEDURE — 74011250637 HC RX REV CODE- 250/637: Performed by: RADIOLOGY

## 2019-03-19 PROCEDURE — 84157 ASSAY OF PROTEIN OTHER: CPT

## 2019-03-19 PROCEDURE — 74011250636 HC RX REV CODE- 250/636

## 2019-03-19 PROCEDURE — 74011000250 HC RX REV CODE- 250: Performed by: RADIOLOGY

## 2019-03-19 PROCEDURE — 82945 GLUCOSE OTHER FLUID: CPT

## 2019-03-19 PROCEDURE — 89050 BODY FLUID CELL COUNT: CPT

## 2019-03-19 PROCEDURE — 62270 DX LMBR SPI PNXR: CPT

## 2019-03-19 RX ORDER — LIDOCAINE HYDROCHLORIDE 10 MG/ML
INJECTION, SOLUTION EPIDURAL; INFILTRATION; INTRACAUDAL; PERINEURAL
Status: COMPLETED
Start: 2019-03-19 | End: 2019-03-19

## 2019-03-19 RX ORDER — SODIUM BICARBONATE 42 MG/ML
2 INJECTION, SOLUTION INTRAVENOUS
Status: COMPLETED | OUTPATIENT
Start: 2019-03-19 | End: 2019-03-19

## 2019-03-19 RX ORDER — ACETAMINOPHEN 325 MG/1
650 TABLET ORAL ONCE
Status: COMPLETED | OUTPATIENT
Start: 2019-03-19 | End: 2019-03-19

## 2019-03-19 RX ADMIN — SODIUM BICARBONATE 10 MG: 42 INJECTION, SOLUTION INTRAVENOUS at 11:33

## 2019-03-19 RX ADMIN — LIDOCAINE HYDROCHLORIDE 30 ML: 10 INJECTION, SOLUTION EPIDURAL; INFILTRATION; INTRACAUDAL; PERINEURAL at 11:32

## 2019-03-19 RX ADMIN — ACETAMINOPHEN 650 MG: 325 TABLET ORAL at 13:02

## 2019-03-19 NOTE — PROGRESS NOTES
1205 Received patient post lumbar puncture via stretcher from [facility]. States she is having bilateral leg pain \"level 8\" that began with the lumbar puncture procedure. Elevated legs slightly (2 inches) with folded blankets. Patient is dozing. 1300 Patient reports no change in leg pain (level 8) Dr Niru Marley notified, order obtained for tylenol 650 mg, ginger ale given. States she has taken her muscle relaxer and lyrica this morning. Dr Niru Marley will come and check on her when finished with current procedure. 1335 Patient is up to bathroom without difficulty, bandaid on back dry and intact. She does not want to wait for Dr Niru Marley. Discharge instructions reviewed with patient prior to discharge. Transported to discharge area via wheelchair for transport home.

## 2019-03-19 NOTE — DISCHARGE INSTRUCTIONS
[address] [facility]  Department of Interventional Radiology    POST LUMBAR PUNCTURE  DISCHARGE INSTRUCTIONS  General Information:  Lumbar Puncture: A LP is done to help diagnose several disorders, like pseudo tumor, migraines, meningitis, and multiple sclerosis. It involves a puncture (usually in the lower spine) into the sac that protects the spinal column. A sample of the fluid in that space is removed and tested in the lab. You will be asked to lie down after the procedure to prevent complications. You should also rest for 24 hours after you go home, and force fluids. If you have a headache, you should take Tylenol or acetaminophen. Call If:     You should call your Physician and/or the Radiology Nurse if you develop a headache that is not relieved by Tylenol, and worsens when you stand and eases when you lie down, you need to call. You may have developed what is referred to as a spinal headache. Our physician's will probably advise you to be on strict bed rest for 24 hours, to drink lots of fluids and caffeine. If this does not help the head pain, call again the next day. You should call if you have bleeding other than a small spot on your bandage. You should call if you have any numbness, tingling, weakness, fever, chills, urinary retention, severe itching, rash, welts, swelling, or confusion. Follow-Up Instructions: See the doctor who ordered your procedure as he/she has instructed. If you had a Lumbar Puncture or Myelogram, your results should be available to your ordering doctor in 3-5 business days. You can remove your dressing in 24 hours and shower regularly. Do not bathe or swim for 72 hours. To Reach Us:   Side effects of sedation medications and other medications used today have been reviewed. Notify us of nausea, itching, hives, dizziness, or anything else out of the ordinary.        Should you experience any of these significant changes, please call [phone] between the hours of 7:30 am and 10 pm or [phone] after hours.  After hours, ask the  to page the [address] Technologist, and describe the problem to the technologist.        Patient Signature:  Date: 3/19/2019  Discharging Nurse: Tigre Jones RN

## 2019-03-19 NOTE — PROCEDURES
PROCEDURE:LP under flouro. INDICATION:pseudotumor. ANESTHESIA:local.  COMPLICATION:NONE. SPECIMENS REMOVED:foru tubes. BLOOD LOSS:NONE. /ASSISTANT:VICTORINO De La Cruz RECOMMENDATIONS:BRx4hr. CONSENT OBTAINED:YES.  NOTES:none.

## 2019-04-18 NOTE — TELEPHONE ENCOUNTER
Future Appointments   Date Time Provider Blake Ayers   6/5/2019 10:20 AM Greg Aparicio  [facility]                         Last Appointment My Department:  3/6/2019    Would you like to refill below?     Requested Prescriptions     Pending Prescriptions Disp Refills    metFORMIN (GLUCOPHAGE) 1,000 mg tablet 60 Tab 4     Sig: take 1 tablet by mouth twice a day with MORNING AND EVENING MEALS       Received a fax request for this medication

## 2019-04-19 RX ORDER — METFORMIN HYDROCHLORIDE 1000 MG/1
TABLET ORAL
Qty: 60 TAB | Refills: 4 | Status: SHIPPED | OUTPATIENT
Start: 2019-04-19 | End: 2020-03-11 | Stop reason: SDUPTHER

## 2019-04-29 ENCOUNTER — TELEPHONE (OUTPATIENT)
Dept: NEUROLOGY | Age: 44
End: 2019-04-29

## 2019-04-29 NOTE — TELEPHONE ENCOUNTER
----- Message from Kaitlynn Daniels sent at 4/29/2019 11:11 AM EDT -----  Regarding: Dr. Bernice Litten: [phone]  Pt requesting an urgent appt due to her having seizures everyday since Friday. Per Rosendo Mota was told to send a message.

## 2019-04-29 NOTE — TELEPHONE ENCOUNTER
Called patient and asked a few questions in regards to seizure. Patient stated her last seizure was last night, she also stated she was alone during her seizure and not able to tell how long the seizure was. Patient stated she did not go to urgent care or the ED. While speaking with the patient she stated she was on her way to work now, she has still been driving. Advised to will get in contact with Dr. Flynn Hernandez to see what he wants her to do at this time.

## 2019-04-29 NOTE — TELEPHONE ENCOUNTER
Discussed the patient's condition with Dr. Reji Murphy, per Dr. Reji Murphy he wants the patient to go the Emergency room. Patient stated she didn't want to go to the hospital. Offered to place the patient on the cancellation and to offer her a sooner appointment. Patient was okay with that. Patient didn't want to go to the ER.

## 2019-04-30 NOTE — TELEPHONE ENCOUNTER
Scheduled the patient for an appointment on 05/01/19 at 10:20am, patient stated she will be here for that appointment.

## 2019-05-01 ENCOUNTER — OFFICE VISIT (OUTPATIENT)
Dept: NEUROLOGY | Age: 44
End: 2019-05-01

## 2019-05-01 VITALS
SYSTOLIC BLOOD PRESSURE: 124 MMHG | OXYGEN SATURATION: 97 % | HEIGHT: 62 IN | HEART RATE: 76 BPM | BODY MASS INDEX: 40.3 KG/M2 | DIASTOLIC BLOOD PRESSURE: 80 MMHG | WEIGHT: 219 LBS

## 2019-05-01 DIAGNOSIS — E53.8 B12 DEFICIENCY: ICD-10-CM

## 2019-05-01 DIAGNOSIS — M79.7 FIBROMYALGIA: ICD-10-CM

## 2019-05-01 DIAGNOSIS — R11.0 NAUSEA: ICD-10-CM

## 2019-05-01 DIAGNOSIS — G43.711 INTRACTABLE CHRONIC MIGRAINE WITHOUT AURA AND WITH STATUS MIGRAINOSUS: ICD-10-CM

## 2019-05-01 DIAGNOSIS — F51.01 PRIMARY INSOMNIA: ICD-10-CM

## 2019-05-01 DIAGNOSIS — G93.2 PSEUDOTUMOR CEREBRI: ICD-10-CM

## 2019-05-01 DIAGNOSIS — R41.0 DISORIENTATION: Primary | ICD-10-CM

## 2019-05-01 DIAGNOSIS — R53.83 OTHER FATIGUE: ICD-10-CM

## 2019-05-01 RX ORDER — LAMOTRIGINE 25 MG/1
25 TABLET ORAL DAILY
Qty: 42 TAB | Refills: 0 | Status: SHIPPED | OUTPATIENT
Start: 2019-05-01 | End: 2020-03-11 | Stop reason: ALTCHOICE

## 2019-05-01 RX ORDER — CYANOCOBALAMIN 1000 UG/ML
INJECTION, SOLUTION INTRAMUSCULAR; SUBCUTANEOUS
Qty: 8 VIAL | Refills: 3
Start: 2019-05-01 | End: 2020-03-11 | Stop reason: SDUPTHER

## 2019-05-01 RX ORDER — PREGABALIN 150 MG/1
150 CAPSULE ORAL 2 TIMES DAILY
Qty: 60 CAP | Refills: 3 | Status: SHIPPED | OUTPATIENT
Start: 2019-05-01 | End: 2019-05-17 | Stop reason: SDUPTHER

## 2019-05-01 RX ORDER — ZONISAMIDE 100 MG/1
CAPSULE ORAL
Qty: 120 CAP | Refills: 5 | Status: SHIPPED | OUTPATIENT
Start: 2019-05-01 | End: 2020-03-11 | Stop reason: SDUPTHER

## 2019-05-01 RX ORDER — QUETIAPINE FUMARATE 50 MG/1
TABLET, FILM COATED ORAL
Qty: 30 TAB | Refills: 4 | Status: SHIPPED | OUTPATIENT
Start: 2019-05-01 | End: 2019-05-17 | Stop reason: SDUPTHER

## 2019-05-01 RX ORDER — LANCETS
EACH MISCELLANEOUS
Qty: 1 PACKAGE | Refills: 11 | Status: SHIPPED | OUTPATIENT
Start: 2019-05-01 | End: 2020-10-16 | Stop reason: SDUPTHER

## 2019-05-01 RX ORDER — ACETAZOLAMIDE 500 MG/1
500 CAPSULE, EXTENDED RELEASE ORAL 2 TIMES DAILY
Qty: 60 CAP | Refills: 3 | Status: SHIPPED | OUTPATIENT
Start: 2019-05-01 | End: 2019-07-22

## 2019-05-01 RX ORDER — LAMOTRIGINE 50 MG/1
50 TABLET, ORALLY DISINTEGRATING ORAL 2 TIMES DAILY
Qty: 60 TAB | Refills: 3 | Status: SHIPPED | OUTPATIENT
Start: 2019-05-01 | End: 2019-05-17 | Stop reason: SDUPTHER

## 2019-05-01 RX ORDER — METHOCARBAMOL 750 MG/1
750 TABLET, FILM COATED ORAL 3 TIMES DAILY
Qty: 90 TAB | Refills: 3 | Status: SHIPPED | OUTPATIENT
Start: 2019-05-01 | End: 2019-05-17 | Stop reason: SDUPTHER

## 2019-05-01 RX ORDER — ATENOLOL 100 MG/1
100 TABLET ORAL DAILY
Qty: 90 TAB | Refills: 3 | Status: SHIPPED | OUTPATIENT
Start: 2019-05-01 | End: 2021-10-01

## 2019-05-01 RX ORDER — ONDANSETRON 4 MG/1
4 TABLET, ORALLY DISINTEGRATING ORAL
Qty: 16 TAB | Refills: 5 | Status: SHIPPED | OUTPATIENT
Start: 2019-05-01 | End: 2020-11-16 | Stop reason: SDUPTHER

## 2019-05-01 NOTE — PATIENT INSTRUCTIONS
A Healthy Lifestyle: Care Instructions  Your Care Instructions    A healthy lifestyle can help you feel good, stay at a healthy weight, and have plenty of energy for both work and play. A healthy lifestyle is something you can share with your whole family. A healthy lifestyle also can lower your risk for serious health problems, such as high blood pressure, heart disease, and diabetes. You can follow a few steps listed below to improve your health and the health of your family. Follow-up care is a key part of your treatment and safety. Be sure to make and go to all appointments, and call your doctor if you are having problems. It's also a good idea to know your test results and keep a list of the medicines you take. How can you care for yourself at home? · Do not eat too much sugar, fat, or fast foods. You can still have dessert and treats now and then. The goal is moderation. · Start small to improve your eating habits. Pay attention to portion sizes, drink less juice and soda pop, and eat more fruits and vegetables. ? Eat a healthy amount of food. A 3-ounce serving of meat, for example, is about the size of a deck of cards. Fill the rest of your plate with vegetables and whole grains. ? Limit the amount of soda and sports drinks you have every day. Drink more water when you are thirsty. ? Eat at least 5 servings of fruits and vegetables every day. It may seem like a lot, but it is not hard to reach this goal. A serving or helping is 1 piece of fruit, 1 cup of vegetables, or 2 cups of leafy, raw vegetables. Have an apple or some carrot sticks as an afternoon snack instead of a candy bar. Try to have fruits and/or vegetables at every meal.  · Make exercise part of your daily routine. You may want to start with simple activities, such as walking, bicycling, or slow swimming. Try to be active 30 to 60 minutes every day. You do not need to do all 30 to 60 minutes all at once.  For example, you can exercise 3 times a day for 10 or 20 minutes. Moderate exercise is safe for most people, but it is always a good idea to talk to your doctor before starting an exercise program.  · Keep moving. Tiffanie Zazuetae the lawn, work in the garden, or [place]. Take the stairs instead of the elevator at work. · If you smoke, quit. People who smoke have an increased risk for heart attack, stroke, cancer, and other lung illnesses. Quitting is hard, but there are ways to boost your chance of quitting tobacco for good. ? Use nicotine gum, patches, or lozenges. ? Ask your doctor about stop-smoking programs and medicines. ? Keep trying. In addition to reducing your risk of diseases in the future, you will notice some benefits soon after you stop using tobacco. If you have shortness of breath or asthma symptoms, they will likely get better within a few weeks after you quit. · Limit how much alcohol you drink. Moderate amounts of alcohol (up to 2 drinks a day for men, 1 drink a day for women) are okay. But drinking too much can lead to liver problems, high blood pressure, and other health problems. Family health  If you have a family, there are many things you can do together to improve your health. · Eat meals together as a family as often as possible. · Eat healthy foods. This includes fruits, vegetables, lean meats and dairy, and whole grains. · Include your family in your fitness plan. Most people think of activities such as jogging or tennis as the way to fitness, but there are many ways you and your family can be more active. Anything that makes you breathe hard and gets your heart pumping is exercise. Here are some tips:  ? Walk to do errands or to take your child to school or the bus.  ? Go for a family bike ride after dinner instead of watching TV. Where can you learn more? Go to http://dusty-ari.info/. Enter Z090 in the search box to learn more about \"A Healthy Lifestyle: Care Instructions. \"  Current as of: September 11, 2018  Content Version: 11.9  © 3375-6955 [employer], [employer]. Care instructions adapted under license by [employer] (which disclaims liability or warranty for this information). If you have questions about a medical condition or this instruction, always ask your healthcare professional. [address] any warranty or liability for your use of this information. Office Policies      Paperwork            Any paperwork that needs to be completed has a 3 WEEK turnaround time. Phone calls/patient messages:    · Please allow up to 24 hours for someone in the office to contact you about your call or message. Be mindful your provider may be out of the office or your message may require further review. We encourage you to use [employer] for your messages as this is a faster, more efficient way to communicate with our office           Medication Refills:    · Prescription medications require up to 48 business hours to process. We encourage you to use [employer] for your refills. · For controlled medications: Please allow up to 72 business hours to process. Certain medications may require you to  a written prescription at our office.   · NO narcotic/controlled medications will be prescribed after 4pm Monday through Friday or on weekends
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
## 2019-05-17 DIAGNOSIS — M79.7 FIBROMYALGIA: ICD-10-CM

## 2019-05-17 DIAGNOSIS — F51.01 PRIMARY INSOMNIA: ICD-10-CM

## 2019-05-17 NOTE — TELEPHONE ENCOUNTER
Received a fax request from [facility] for a 90 day supply for each medication listed below. Requested Prescriptions     Pending Prescriptions Disp Refills    pregabalin (LYRICA) 150 mg capsule 180 Cap 3     Sig: Take 1 Cap by mouth two (2) times a day. Max Daily Amount: 300 mg.    lamoTRIgine (LAMICTAL) 50 mg disintegrating tablet 180 Tab 3     Sig: Take 1 Tab by mouth two (2) times a day.  QUEtiapine (SEROQUEL) 50 mg tablet 90 Tab 3     Sig: take 1 tablet by mouth NIGHTLY    methocarbamol (ROBAXIN) 750 mg tablet 270 Tab 3     Sig: Take 1 Tab by mouth three (3) times daily.        Future Appointments   Date Time Provider Blake Ayers   5/30/2019  3:45 PM [facility]   7/11/2019  9:00 [facility]   8/15/2019 10:40 AM Diana Roberts  [facility]                         Last Appointment My Department:  5/1/2019

## 2019-05-18 RX ORDER — LAMOTRIGINE 50 MG/1
50 TABLET, ORALLY DISINTEGRATING ORAL 2 TIMES DAILY
Qty: 180 TAB | Refills: 3 | Status: SHIPPED | OUTPATIENT
Start: 2019-05-18 | End: 2020-12-29

## 2019-05-18 RX ORDER — METHOCARBAMOL 750 MG/1
750 TABLET, FILM COATED ORAL 3 TIMES DAILY
Qty: 270 TAB | Refills: 3 | Status: SHIPPED | OUTPATIENT
Start: 2019-05-18 | End: 2020-03-11

## 2019-05-18 RX ORDER — QUETIAPINE FUMARATE 50 MG/1
TABLET, FILM COATED ORAL
Qty: 90 TAB | Refills: 3 | Status: SHIPPED | OUTPATIENT
Start: 2019-05-18 | End: 2019-12-06 | Stop reason: SDUPTHER

## 2019-05-18 RX ORDER — PREGABALIN 150 MG/1
150 CAPSULE ORAL 2 TIMES DAILY
Qty: 180 CAP | Refills: 3 | Status: SHIPPED | OUTPATIENT
Start: 2019-05-18 | End: 2019-12-16 | Stop reason: SDUPTHER

## 2019-05-20 ENCOUNTER — DOCUMENTATION ONLY (OUTPATIENT)
Dept: NEUROLOGY | Age: 44
End: 2019-05-20

## 2019-05-30 ENCOUNTER — HOSPITAL ENCOUNTER (OUTPATIENT)
Dept: MRI IMAGING | Age: 44
Discharge: HOME | End: 2019-05-30
Attending: PAIN MEDICINE
Payer: COMMERCIAL

## 2019-05-30 DIAGNOSIS — M47.816 LUMBAR SPONDYLOSIS: ICD-10-CM

## 2019-05-30 DIAGNOSIS — M70.61 TROCHANTERIC BURSITIS OF RIGHT HIP: ICD-10-CM

## 2019-05-30 DIAGNOSIS — M62.838 SPASM OF MUSCLE: ICD-10-CM

## 2019-05-30 PROCEDURE — 72148 MRI LUMBAR SPINE W/O DYE: CPT

## 2019-06-28 ENCOUNTER — HOSPITAL ENCOUNTER (EMERGENCY)
Age: 44
Discharge: HOME | End: 2019-06-29
Attending: EMERGENCY MEDICINE
Payer: COMMERCIAL

## 2019-06-28 DIAGNOSIS — I10 ACCELERATED HYPERTENSION: ICD-10-CM

## 2019-06-28 DIAGNOSIS — R11.0 NAUSEA WITHOUT VOMITING: ICD-10-CM

## 2019-06-28 DIAGNOSIS — R10.84 ABDOMINAL PAIN, GENERALIZED: ICD-10-CM

## 2019-06-28 DIAGNOSIS — N39.0 URINARY TRACT INFECTION WITHOUT HEMATURIA, SITE UNSPECIFIED: Primary | ICD-10-CM

## 2019-06-28 LAB
ALBUMIN SERPL-MCNC: 3.5 G/DL (ref 3.5–5)
ALBUMIN/GLOB SERPL: 0.8 {RATIO} (ref 1.1–2.2)
ALP SERPL-CCNC: 118 U/L (ref 45–117)
ALT SERPL-CCNC: 27 U/L (ref 12–78)
ANION GAP SERPL CALC-SCNC: 5 MMOL/L (ref 5–15)
APPEARANCE UR: ABNORMAL
AST SERPL-CCNC: 24 U/L (ref 15–37)
BACTERIA URNS QL MICRO: ABNORMAL /HPF
BASOPHILS # BLD: 0 K/UL (ref 0–0.1)
BASOPHILS NFR BLD: 0 % (ref 0–1)
BILIRUB SERPL-MCNC: 0.2 MG/DL (ref 0.2–1)
BILIRUB UR QL: NEGATIVE
BUN SERPL-MCNC: 16 MG/DL (ref 6–20)
BUN/CREAT SERPL: 18 (ref 12–20)
CALCIUM SERPL-MCNC: 9 MG/DL (ref 8.5–10.1)
CHLORIDE SERPL-SCNC: 112 MMOL/L (ref 97–108)
CO2 SERPL-SCNC: 24 MMOL/L (ref 21–32)
COLOR UR: ABNORMAL
CREAT SERPL-MCNC: 0.89 MG/DL (ref 0.55–1.02)
DIFFERENTIAL METHOD BLD: ABNORMAL
EOSINOPHIL # BLD: 0 K/UL (ref 0–0.4)
EOSINOPHIL NFR BLD: 0 % (ref 0–7)
EPITH CASTS URNS QL MICRO: ABNORMAL /LPF
ERYTHROCYTE [DISTWIDTH] IN BLOOD BY AUTOMATED COUNT: 14.5 % (ref 11.5–14.5)
GLOBULIN SER CALC-MCNC: 4.3 G/DL (ref 2–4)
GLUCOSE SERPL-MCNC: 140 MG/DL (ref 65–100)
GLUCOSE UR STRIP.AUTO-MCNC: NEGATIVE MG/DL
HCT VFR BLD AUTO: 42.4 % (ref 35–47)
HGB BLD-MCNC: 13.3 G/DL (ref 11.5–16)
HGB UR QL STRIP: NEGATIVE
HYALINE CASTS URNS QL MICRO: ABNORMAL /LPF (ref 0–5)
IMM GRANULOCYTES # BLD AUTO: 0 K/UL (ref 0–0.04)
IMM GRANULOCYTES NFR BLD AUTO: 0 % (ref 0–0.5)
KETONES UR QL STRIP.AUTO: NEGATIVE MG/DL
LEUKOCYTE ESTERASE UR QL STRIP.AUTO: ABNORMAL
LYMPHOCYTES # BLD: 1.1 K/UL (ref 0.8–3.5)
LYMPHOCYTES NFR BLD: 12 % (ref 12–49)
MCH RBC QN AUTO: 28.4 PG (ref 26–34)
MCHC RBC AUTO-ENTMCNC: 31.4 G/DL (ref 30–36.5)
MCV RBC AUTO: 90.4 FL (ref 80–99)
MONOCYTES # BLD: 0.3 K/UL (ref 0–1)
MONOCYTES NFR BLD: 3 % (ref 5–13)
NEUTS SEG # BLD: 7.4 K/UL (ref 1.8–8)
NEUTS SEG NFR BLD: 85 % (ref 32–75)
NITRITE UR QL STRIP.AUTO: NEGATIVE
NRBC # BLD: 0 K/UL (ref 0–0.01)
NRBC BLD-RTO: 0 PER 100 WBC
PH UR STRIP: 7.5 [PH] (ref 5–8)
PLATELET # BLD AUTO: 212 K/UL (ref 150–400)
PMV BLD AUTO: 12 FL (ref 8.9–12.9)
POTASSIUM SERPL-SCNC: 4.5 MMOL/L (ref 3.5–5.1)
PROT SERPL-MCNC: 7.8 G/DL (ref 6.4–8.2)
PROT UR STRIP-MCNC: NEGATIVE MG/DL
RBC # BLD AUTO: 4.69 M/UL (ref 3.8–5.2)
RBC #/AREA URNS HPF: ABNORMAL /HPF (ref 0–5)
SODIUM SERPL-SCNC: 141 MMOL/L (ref 136–145)
SP GR UR REFRACTOMETRY: 1.03 (ref 1–1.03)
UA: UC IF INDICATED,UAUC: ABNORMAL
UROBILINOGEN UR QL STRIP.AUTO: 1 EU/DL (ref 0.2–1)
WBC # BLD AUTO: 8.8 K/UL (ref 3.6–11)
WBC URNS QL MICRO: ABNORMAL /HPF (ref 0–4)

## 2019-06-28 PROCEDURE — 81001 URINALYSIS AUTO W/SCOPE: CPT

## 2019-06-28 PROCEDURE — 87086 URINE CULTURE/COLONY COUNT: CPT

## 2019-06-28 PROCEDURE — 85025 COMPLETE CBC W/AUTO DIFF WBC: CPT

## 2019-06-28 PROCEDURE — 80053 COMPREHEN METABOLIC PANEL: CPT

## 2019-06-28 PROCEDURE — 96361 HYDRATE IV INFUSION ADD-ON: CPT

## 2019-06-28 PROCEDURE — 74011250636 HC RX REV CODE- 250/636: Performed by: EMERGENCY MEDICINE

## 2019-06-28 PROCEDURE — 96374 THER/PROPH/DIAG INJ IV PUSH: CPT

## 2019-06-28 PROCEDURE — 99283 EMERGENCY DEPT VISIT LOW MDM: CPT

## 2019-06-28 PROCEDURE — 96375 TX/PRO/DX INJ NEW DRUG ADDON: CPT

## 2019-06-28 PROCEDURE — 36415 COLL VENOUS BLD VENIPUNCTURE: CPT

## 2019-06-28 RX ORDER — KETOROLAC TROMETHAMINE 30 MG/ML
15 INJECTION, SOLUTION INTRAMUSCULAR; INTRAVENOUS
Status: COMPLETED | OUTPATIENT
Start: 2019-06-28 | End: 2019-06-28

## 2019-06-28 RX ADMIN — SODIUM CHLORIDE 1000 ML: 900 INJECTION, SOLUTION INTRAVENOUS at 23:12

## 2019-06-28 RX ADMIN — KETOROLAC TROMETHAMINE 15 MG: 30 INJECTION, SOLUTION INTRAMUSCULAR at 23:11

## 2019-06-28 NOTE — LETTER
[address] 
[facility] EMERGENCY DEPT 
[address] [place] [address] [id number] [phone] Work/School Note Date: 6/28/2019 To Whom It May concern: 
 
Sandy Walker was seen and treated today in the emergency room by the following provider(s): 
Attending Provider: Eliseo Pena MD.   
 
Sandy Walker may return to work on 7/1/19. Sincerely, Buck Mcnamara MD

## 2019-06-29 VITALS
DIASTOLIC BLOOD PRESSURE: 87 MMHG | BODY MASS INDEX: 41.54 KG/M2 | RESPIRATION RATE: 16 BRPM | HEART RATE: 81 BPM | HEIGHT: 62 IN | OXYGEN SATURATION: 99 % | SYSTOLIC BLOOD PRESSURE: 132 MMHG | TEMPERATURE: 97.8 F | WEIGHT: 225.75 LBS

## 2019-06-29 PROCEDURE — 74011250636 HC RX REV CODE- 250/636: Performed by: EMERGENCY MEDICINE

## 2019-06-29 PROCEDURE — 96365 THER/PROPH/DIAG IV INF INIT: CPT

## 2019-06-29 PROCEDURE — 74011250637 HC RX REV CODE- 250/637: Performed by: EMERGENCY MEDICINE

## 2019-06-29 PROCEDURE — 74011000258 HC RX REV CODE- 258: Performed by: EMERGENCY MEDICINE

## 2019-06-29 RX ORDER — CEPHALEXIN 500 MG/1
500 CAPSULE ORAL 2 TIMES DAILY
Qty: 14 CAP | Refills: 0 | Status: SHIPPED | OUTPATIENT
Start: 2019-06-29 | End: 2019-07-06

## 2019-06-29 RX ORDER — DICYCLOMINE HYDROCHLORIDE 20 MG/1
20 TABLET ORAL EVERY 6 HOURS
Qty: 20 TAB | Refills: 0 | Status: SHIPPED | OUTPATIENT
Start: 2019-06-29 | End: 2019-07-05

## 2019-06-29 RX ORDER — NAPROXEN 500 MG/1
500 TABLET, DELAYED RELEASE ORAL 2 TIMES DAILY WITH MEALS
Qty: 20 TAB | Refills: 0 | Status: SHIPPED | OUTPATIENT
Start: 2019-06-29 | End: 2020-07-16

## 2019-06-29 RX ORDER — ONDANSETRON 4 MG/1
4 TABLET, ORALLY DISINTEGRATING ORAL
Qty: 20 TAB | Refills: 0 | Status: SHIPPED | OUTPATIENT
Start: 2019-06-29 | End: 2020-10-16 | Stop reason: SDUPTHER

## 2019-06-29 RX ORDER — DICYCLOMINE HYDROCHLORIDE 10 MG/1
10 CAPSULE ORAL
Status: COMPLETED | OUTPATIENT
Start: 2019-06-29 | End: 2019-06-29

## 2019-06-29 RX ADMIN — DICYCLOMINE HYDROCHLORIDE 10 MG: 10 CAPSULE ORAL at 00:41

## 2019-06-29 RX ADMIN — CEFTRIAXONE 1 G: 1 INJECTION, POWDER, FOR SOLUTION INTRAMUSCULAR; INTRAVENOUS at 00:05

## 2019-06-29 NOTE — ED NOTES
Patient discharged by Dr. Jeny Carbajal. IV removed from [address] [place] [address] w/o difficulty, catheter intact, no redness/swelling. Patient ambulated off unit, home via private car.

## 2019-06-29 NOTE — DISCHARGE INSTRUCTIONS
Thank you for allowing us to take care of you today! We hope we addressed all of your concerns and needs. We strive to provide excellent quality care in the Emergency Department. You will receive a survey after your visit to evaluate the care you were provided. Should you receive a survey from us, we invite you to share your experience and tell us what made it excellent. It was a pleasure serving you, we invite you to share your experience with us, in our pursuit for excellence, should you be selected to receive a survey. The exam and treatment you received in the Emergency Department were for an urgent problem and are not intended as complete care. It is important that you follow up with a doctor, nurse practitioner, or physician assistant for ongoing care. If your symptoms become worse or you do not improve as expected and you are unable to reach your usual health care provider, you should return to the Emergency Department. We are available 24 hours a day. Please take your discharge instructions with you when you go to your follow-up appointment. If you have any problem arranging a follow-up appointment, contact the Emergency Department immediately. If a prescription has been provided, please have it filled as soon as possible to prevent a delay in treatment. Read the entire medication instruction sheet provided to you by the pharmacy. If you have any questions or reservations about taking the medication due to side effects or interactions with other medications, please call your primary care physician or contact the ER to speak with the charge nurse. Make an appointment with your family doctor or the physician you were referred to for follow-up of this visit as instructed on your discharge paperwork, as this is mandatory follow-up. Return to the ER if you are unable to be seen or if you are unable to be seen in a timely manner.     If you have any problem arranging the follow-up visit, contact the Emergency Department immediately. I hope you feel better and thank you again for allow us to provide you with excellent care today at [place] [address].! Warmest regards,    Kishor Benites MD  Emergency Medicine Physician  [place] [address].              Patient Education        Abdominal Pain: Care Instructions  Your Care Instructions    Abdominal pain has many possible causes. Some aren't serious and get better on their own in a few days. Others need more testing and treatment. If your pain continues or gets worse, you need to be rechecked and may need more tests to find out what is wrong. You may need surgery to correct the problem. Don't ignore new symptoms, such as fever, nausea and vomiting, urination problems, pain that gets worse, and dizziness. These may be signs of a more serious problem. Your doctor may have recommended a follow-up visit in the next 8 to 12 hours. If you are not getting better, you may need more tests or treatment. The doctor has checked you carefully, but problems can develop later. If you notice any problems or new symptoms, get medical treatment right away. Follow-up care is a key part of your treatment and safety. Be sure to make and go to all appointments, and call your doctor if you are having problems. It's also a good idea to know your test results and keep a list of the medicines you take. How can you care for yourself at home? · Rest until you feel better. · To prevent dehydration, drink plenty of fluids, enough so that your urine is light yellow or clear like water. Choose water and other caffeine-free clear liquids until you feel better. If you have kidney, heart, or liver disease and have to limit fluids, talk with your doctor before you increase the amount of fluids you drink. · If your stomach is upset, eat mild foods, such as rice, dry toast or crackers, bananas, and applesauce.  Try eating several small meals instead of two or three large ones. · Wait until 48 hours after all symptoms have gone away before you have spicy foods, alcohol, and drinks that contain caffeine. · Do not eat foods that are high in fat. · Avoid anti-inflammatory medicines such as aspirin, ibuprofen (Advil, Motrin), and naproxen (Aleve). These can cause stomach upset. Talk to your doctor if you take daily aspirin for another health problem. When should you call for help? Call 911 anytime you think you may need emergency care. For example, call if:    · You passed out (lost consciousness).     · You pass maroon or very bloody stools.     · You vomit blood or what looks like coffee grounds.     · You have new, severe belly pain.    Call your doctor now or seek immediate medical care if:    · Your pain gets worse, especially if it becomes focused in one area of your belly.     · You have a new or higher fever.     · Your stools are black and look like tar, or they have streaks of blood.     · You have unexpected vaginal bleeding.     · You have symptoms of a urinary tract infection. These may include:  ? Pain when you urinate. ? Urinating more often than usual.  ? Blood in your urine.     · You are dizzy or lightheaded, or you feel like you may faint.    Watch closely for changes in your health, and be sure to contact your doctor if:    · You are not getting better after 1 day (24 hours). Where can you learn more? Go to http://dusty-ari.info/. Enter T748 in the search box to learn more about \"Abdominal Pain: Care Instructions. \"  Current as of: September 23, 2018  Content Version: 11.9  © 0863-3687 [employer]. Care instructions adapted under license by [employer] (which disclaims liability or warranty for this information).  If you have questions about a medical condition or this instruction, always ask your healthcare professional. Cecelia Duarte any warranty or liability for your use of this information. Patient Education        Urinary Tract Infection in Women: Care Instructions  Your Care Instructions    A urinary tract infection, or UTI, is a general term for an infection anywhere between the kidneys and the urethra (where urine comes out). Most UTIs are bladder infections. They often cause pain or burning when you urinate. UTIs are caused by bacteria and can be cured with antibiotics. Be sure to complete your treatment so that the infection goes away. Follow-up care is a key part of your treatment and safety. Be sure to make and go to all appointments, and call your doctor if you are having problems. It's also a good idea to know your test results and keep a list of the medicines you take. How can you care for yourself at home? · Take your antibiotics as directed. Do not stop taking them just because you feel better. You need to take the full course of antibiotics. · Drink extra water and other fluids for the next day or two. This may help wash out the bacteria that are causing the infection. (If you have kidney, heart, or liver disease and have to limit fluids, talk with your doctor before you increase your fluid intake.)  · Avoid drinks that are carbonated or have caffeine. They can irritate the bladder. · Urinate often. Try to empty your bladder each time. · To relieve pain, take a hot bath or lay a heating pad set on low over your lower belly or genital area. Never go to sleep with a heating pad in place. To prevent UTIs  · Drink plenty of water each day. This helps you urinate often, which clears bacteria from your system. (If you have kidney, heart, or liver disease and have to limit fluids, talk with your doctor before you increase your fluid intake.)  · Urinate when you need to. · Urinate right after you have sex. · Change sanitary pads often.   · Avoid douches, bubble baths, feminine hygiene sprays, and other feminine hygiene products that have deodorants. · After going to the bathroom, wipe from front to back. When should you call for help? Call your doctor now or seek immediate medical care if:    · Symptoms such as fever, chills, nausea, or vomiting get worse or appear for the first time.     · You have new pain in your back just below your rib cage. This is called flank pain.     · There is new blood or pus in your urine.     · You have any problems with your antibiotic medicine.    Watch closely for changes in your health, and be sure to contact your doctor if:    · You are not getting better after taking an antibiotic for 2 days.     · Your symptoms go away but then come back. Where can you learn more? Go to http://dusty-ari.info/. Enter S486 in the search box to learn more about \"Urinary Tract Infection in Women: Care Instructions. \"  Current as of: March 20, 2018  Content Version: 11.9  © 2211-8804 [employer], [employer]. Care instructions adapted under license by [employer] (which disclaims liability or warranty for this information). If you have questions about a medical condition or this instruction, always ask your healthcare professional. [place] [address] any warranty or liability for your use of this information.

## 2019-06-29 NOTE — ED TRIAGE NOTES
Patient arrives with c/o urinary frequency, stating she urinates and feels like she has to return to bathroom. Hx UTIs. Denies burning, hematuria, fevers, nausea, vomiting, back pain.  Patient has call light in reach, pending MD curiel.

## 2019-06-29 NOTE — ED PROVIDER NOTES
EMERGENCY DEPARTMENT HISTORY AND PHYSICAL EXAM      Date: 6/28/2019  Patient Name: Alphonse Nj  Patient Age and Sex: 37 y.o. female    History of Presenting Illness     Chief Complaint   Patient presents with    Abdominal Pain     pt c/o lower abd pain x2 days; denies n/v/d; states \"I think I have a UTI\"; denies frequency, dysuria, hematuria       History Provided By: Patient    HPI: Alphonse Nj, 37 y.o. female with past medical history as documented below presents to the ED with c/o of two days of persistent, moderate intensity urinary frequency with associated dysuria and lower abdominal pain. Pt states her sx's are consistent with her previous UTI's. She has tried to increase her fluid intake to relieve sx's. She denies taking any other medications for sx's. She does endorse nausea but no emesis. Pt denies any other alleviating or exacerbating factors. Additionally, pt specifically denies any recent fever, chills, headache, vomiting, CP, SOB, lightheadedness, dizziness, numbness, weakness, BLE swelling, heart palpitations, diarrhea, constipation, melena, hematochezia, cough, or congestion. PCP: Destiny Salazar MD    There are no other complaints, changes or physical findings at this time.      Past History   Past Medical History:  Past Medical History:   Diagnosis Date    Asthma     Diabetes ([facility] [address] [facility]     Fibromyalgia     Hypertension     Lower back injury 02/13/2017    Migraines     Nausea and vomiting 1/14/2014    Other ill-defined conditions(799.89)     cerebral tumor, endometriosis, fibromyalgia, herpes    Right lower quadrant abdominal mass 1/14/2014    Seizures ([facility] [address] [facility]     Sleep apnea     intolerant to CPAP    Unspecified adverse effect of anesthesia     pt states that she has an asthma attack when coming out of  anesthesia       Past Surgical History:  Past Surgical History:   Procedure Laterality Date    HX CHOLECYSTECTOMY      HX COLECTOMY  3/14/2013    Rt colectomy for perf transverse diverticulum    HX HEENT      wisdom teeth    HX OTHER SURGICAL      laproscopy on ovaries    HX OTHER SURGICAL      left wrist ganglion cystectomy    HX OTHER SURGICAL      lung mass removed       Family History:  Family History   Problem Relation Age of Onset    Other Other         unable to obtain due to AMS    Hypertension Father     Heart Disease Father     Cancer Father     Hypertension Mother     Diabetes Maternal Grandmother     Diabetes Maternal Grandfather        Social History:  Social History     Tobacco Use    Smoking status: Never Smoker    Smokeless tobacco: Never Used   Substance Use Topics    Alcohol use: Yes     Comment: ocasionally    Drug use: No       Allergies: Allergies   Allergen Reactions    Lemon Anaphylaxis    Adhesive Tape-Silicones Other (comments)     Pt reports it burns her skin    Dilaudid [Hydromorphone (Pf)] Itching    Fentanyl Shortness of Breath    Percocet [Oxycodone-Acetaminophen] Unknown (comments)     Pt states not allergic       Current Medications:  No current facility-administered medications on file prior to encounter. Current Outpatient Medications on File Prior to Encounter   Medication Sig Dispense Refill    pregabalin (LYRICA) 150 mg capsule Take 1 Cap by mouth two (2) times a day. Max Daily Amount: 300 mg. 180 Cap 3    lamoTRIgine (LAMICTAL) 50 mg disintegrating tablet Take 1 Tab by mouth two (2) times a day. 180 Tab 3    QUEtiapine (SEROQUEL) 50 mg tablet take 1 tablet by mouth NIGHTLY 90 Tab 3    methocarbamol (ROBAXIN) 750 mg tablet Take 1 Tab by mouth three (3) times daily. 270 Tab 3    acetaZOLAMIDE SR (DIAMOX) 500 mg capsule Take 1 Cap by mouth two (2) times a day.  60 Cap 3    cyanocobalamin (VITAMIN B-12) 1,000 mcg/mL injection Intramuscular injection of 1000 mcg every week for 4 weeks then every month  Indications: inadequate vitamin B12 8 Vial 3    zonisamide (ZONEGRAN) 100 mg capsule Take two by mouth twice a day 120 Cap 5    ondansetron (ZOFRAN ODT) 4 mg disintegrating tablet Take 1 Tab by mouth every eight (8) hours as needed for Nausea. 16 Tab 5    lamoTRIgine (LAMICTAL) 25 mg tablet Take 1 Tab by mouth daily. STAER: Take one daily for two weeks. Then twice a day for two weeks. . 42 Tab 0    lancets (ONETOUCH ULTRASOFT LANCETS) misc Use as directed 1 Package 11    atenolol (TENORMIN) 100 mg tablet Take 1 Tab by mouth daily. 90 Tab 3    metFORMIN (GLUCOPHAGE) 1,000 mg tablet take 1 tablet by mouth twice a day with MORNING AND EVENING MEALS 60 Tab 4    zonisamide (ZONEGRAN) 100 mg capsule take 2 capsules by mouth twice a day 120 Cap 3    erenumab-aooe (AIMOVIG AUTOINJECTOR, 2 PACK,) 70 mg/mL atIn 140 mg by SubCUTAneous route every thirty (30) days. 2 Syringe 0    erenumab-aooe (AIMOVIG AUTOINJECTOR, 2 PACK,) 70 mg/mL atIn 140 mg by SubCUTAneous route every thirty (30) days. 8 Syringe 3    erenumab-aooe (AIMOVIG AUTOINJECTOR, 2 PACK,) 70 mg/mL atIn 140 mg by SubCUTAneous route every thirty (30) days. 8 Syringe 3    Syringe-Needle, Safety,Disp Un 3 mL 25 gauge x 1\" syrg Use as directed 10 Pen Needle 1    fluticasone-salmeterol (ADVAIR DISKUS) 250-50 mcg/dose diskus inhaler Take 1 Puff by inhalation every twelve (12) hours.  acyclovir (ZOVIRAX) 200 mg capsule Take 200 mg by mouth two (2) times a day.  diphenhydrAMINE (BENADRYL) 25 mg capsule Take 25 mg by mouth as needed. Indications: [facility]erigc to [facility] - only takes with evette         Review of Systems   Review of Systems   Constitutional: Negative. Negative for chills and fever. HENT: Negative. Negative for congestion, facial swelling, rhinorrhea, sore throat, trouble swallowing and voice change. Eyes: Negative. Respiratory: Negative. Negative for apnea, cough, chest tightness, shortness of breath and wheezing. Cardiovascular: Negative. Negative for chest pain, palpitations and leg swelling.    Gastrointestinal: Positive for abdominal pain and nausea. Negative for abdominal distention, blood in stool, constipation, diarrhea and vomiting. Endocrine: Negative. Negative for cold intolerance, heat intolerance and polyuria. Genitourinary: Positive for dysuria and frequency. Negative for difficulty urinating, flank pain, hematuria and urgency. Musculoskeletal: Negative. Negative for arthralgias, back pain, myalgias, neck pain and neck stiffness. Skin: Negative. Negative for color change and rash. Neurological: Negative. Negative for dizziness, syncope, facial asymmetry, speech difficulty, weakness, light-headedness, numbness and headaches. Hematological: Negative. Does not bruise/bleed easily. Psychiatric/Behavioral: Negative. Negative for confusion and self-injury. The patient is not nervous/anxious. Physical Exam   Physical Exam   Constitutional: She is oriented to person, place, and time. She appears well-developed and well-nourished. No distress. HENT:   Head: Normocephalic and atraumatic. Mouth/Throat: Oropharynx is clear and moist. No oropharyngeal exudate. Eyes: Pupils are equal, round, and reactive to light. Conjunctivae and EOM are normal.   Neck: Normal range of motion. Cardiovascular: Normal rate, regular rhythm and normal heart sounds. Exam reveals no gallop and no friction rub. No murmur heard. Pulmonary/Chest: Effort normal and breath sounds normal. No respiratory distress. She has no wheezes. She has no rales. She exhibits no tenderness. Abdominal: Soft. Bowel sounds are normal. She exhibits no distension and no mass. There is no tenderness. There is no rebound and no guarding. Musculoskeletal: Normal range of motion. She exhibits no edema, tenderness or deformity. Neurological: She is alert and oriented to person, place, and time. She displays normal reflexes. No cranial nerve deficit. She exhibits normal muscle tone. Coordination normal.   Skin: Skin is warm. No rash noted.  She is not diaphoretic. Psychiatric: She has a normal mood and affect. Nursing note and vitals reviewed. Diagnostic Study Results     Labs -  Recent Results (from the past 24 hour(s))   CBC WITH AUTOMATED DIFF    Collection Time: 06/28/19 10:28 PM   Result Value Ref Range    WBC 8.8 3.6 - 11.0 K/uL    RBC 4.69 3.80 - 5.20 M/uL    HGB 13.3 11.5 - 16.0 g/dL    HCT 42.4 35.0 - 47.0 %    MCV 90.4 80.0 - 99.0 FL    MCH 28.4 26.0 - 34.0 PG    MCHC 31.4 30.0 - 36.5 g/dL    RDW 14.5 11.5 - 14.5 %    PLATELET 773 652 - 566 K/uL    MPV 12.0 8.9 - 12.9 FL    NRBC 0.0 0  WBC    ABSOLUTE NRBC 0.00 0.00 - 0.01 K/uL    NEUTROPHILS 85 (H) 32 - 75 %    LYMPHOCYTES 12 12 - 49 %    MONOCYTES 3 (L) 5 - 13 %    EOSINOPHILS 0 0 - 7 %    BASOPHILS 0 0 - 1 %    IMMATURE GRANULOCYTES 0 0.0 - 0.5 %    ABS. NEUTROPHILS 7.4 1.8 - 8.0 K/UL    ABS. LYMPHOCYTES 1.1 0.8 - 3.5 K/UL    ABS. MONOCYTES 0.3 0.0 - 1.0 K/UL    ABS. EOSINOPHILS 0.0 0.0 - 0.4 K/UL    ABS. BASOPHILS 0.0 0.0 - 0.1 K/UL    ABS. IMM. GRANS. 0.0 0.00 - 0.04 K/UL    DF AUTOMATED     METABOLIC PANEL, COMPREHENSIVE    Collection Time: 06/28/19 10:28 PM   Result Value Ref Range    Sodium 141 136 - 145 mmol/L    Potassium 4.5 3.5 - 5.1 mmol/L    Chloride 112 (H) 97 - 108 mmol/L    CO2 24 21 - 32 mmol/L    Anion gap 5 5 - 15 mmol/L    Glucose 140 (H) 65 - 100 mg/dL    BUN 16 6 - 20 MG/DL    Creatinine 0.89 0.55 - 1.02 MG/DL    BUN/Creatinine ratio 18 12 - 20      GFR est AA >60 >60 ml/min/1.73m2    GFR est non-AA >60 >60 ml/min/1.73m2    Calcium 9.0 8.5 - 10.1 MG/DL    Bilirubin, total 0.2 0.2 - 1.0 MG/DL    ALT (SGPT) 27 12 - 78 U/L    AST (SGOT) 24 15 - 37 U/L    Alk.  phosphatase 118 (H) 45 - 117 U/L    Protein, total 7.8 6.4 - 8.2 g/dL    Albumin 3.5 3.5 - 5.0 g/dL    Globulin 4.3 (H) 2.0 - 4.0 g/dL    A-G Ratio 0.8 (L) 1.1 - 2.2     URINALYSIS W/ REFLEX CULTURE    Collection Time: 06/28/19 10:28 PM   Result Value Ref Range    Color YELLOW/STRAW      Appearance TURBID (A) CLEAR      Specific gravity 1.026 1.003 - 1.030      pH (UA) 7.5 5.0 - 8.0      Protein NEGATIVE  NEG mg/dL    Glucose NEGATIVE  NEG mg/dL    Ketone NEGATIVE  NEG mg/dL    Bilirubin NEGATIVE  NEG      Blood NEGATIVE  NEG      Urobilinogen 1.0 0.2 - 1.0 EU/dL    Nitrites NEGATIVE  NEG      Leukocyte Esterase SMALL (A) NEG      WBC 5-10 0 - 4 /hpf    RBC 0-5 0 - 5 /hpf    Epithelial cells MODERATE (A) FEW /lpf    Bacteria 1+ (A) NEG /hpf    UA:UC IF INDICATED URINE CULTURE ORDERED (A) CNI      Hyaline cast 2-5 0 - 5 /lpf       Radiologic Studies -   No orders to display     CT Results  (Last 48 hours)    None        CXR Results  (Last 48 hours)    None          Medical Decision Making   I am the first provider for this patient. I reviewed the vital signs, available nursing notes, past medical history, past surgical history, family history and social history. Vital Signs-Reviewed the patient's vital signs. Patient Vitals for the past 24 hrs:   Temp Pulse Resp BP SpO2   06/29/19 0041 -- 81 16 132/87 99 %   06/28/19 2212 97.8 °F (36.6 °C) 61 16 149/84 98 %       Pulse Oximetry Analysis - 98% on RA    Cardiac Monitor:   Rate: 61 bpm  Rhythm: Normal Sinus Rhythm      Records Reviewed: Nursing Notes, Old Medical Records, Previous electrocardiograms, Previous Radiology Studies and Previous Laboratory Studies    Provider Notes (Medical Decision Making):   Patient was presents with dysuria and urinary frequency. DDx: Acute cystitis, pyleonephritis, ureteral stone, STI. Will obtain UA and treat accordingly. Discussed with the patient diagnosis and test results and all questioned fully answered. They understand the importance of staying well hydrated, taking antibiotics as prescribed to completion and using OTC pyridium as desired. She will PCP if any problems arise. ED Course:   Initial assessment performed.  The patients presenting problems have been discussed, and they are in agreement with the care plan formulated and outlined with them. I have encouraged them to ask questions as they arise throughout their visit. ALCOHOL/SUBSTANCE ABUSE COUNSELING:  Upon evaluation, pt endorsed recent alcohol/illicit drug use. For approximately 15 minutes, pt has been counseled on the dangers of alcohol and illicit drug use on their health, and they were encouraged to quit as soon as possible in order to decrease further risks to their health. Pt has conveyed their understanding of the risks involved should they continue to use these products. HYPERTENSION COUNSELING   Education was provided to the patient today regarding their hypertension. Patient is made aware of their elevated blood pressure and is instructed to follow up this week with their Primary Care for a recheck. Patient is counseled regarding consequences of chronic, uncontrolled hypertension including kidney disease, heart disease, stroke or even death. Patient states their understanding and agrees to follow up this week. Additionally, during their visit, I discussed sodium restriction, maintaining ideal body weight and regular exercise program as physiologic means to achieve blood pressure control. The patient will strive towards this. I reviewed our electronic medical record system for any past medical records that were available that may contribute to the patient's current condition, the nursing notes and vital signs from today's visit. Shena Lemons MD    Medications Administered During ED Course:  Medications   ketorolac (TORADOL) injection 15 mg (15 mg IntraVENous Given 6/28/19 2311)   sodium chloride 0.9 % bolus infusion 1,000 mL (0 mL IntraVENous IV Completed 6/29/19 0042)   cefTRIAXone (ROCEPHIN) 1 g in 0.9% sodium chloride (MBP/ADV) 50 mL (0 g IntraVENous IV Completed 6/29/19 0042)   dicyclomine (BENTYL) capsule 10 mg (10 mg Oral Given 6/29/19 0041)     Progress Note  I have re-examined the patient. she feels much better and symptoms improved. Tolerating oral intake. Abdomen is soft and without guarding, rebound or other peritoneal signs. I have discussed with patient the importance of close f/u and to return to the ED if symptoms don't improve or worsen. Progress Note:  Patient has been reassessed and reports feeling better and symptoms have improved after ED treatment. Catie Camarillo is able to tolerate PO and ambulate per baseline. Catie Camarillo's final labs and imaging have been reviewed with her. She has been counseled regarding her diagnosis. She verbally conveys understanding and agreement of the signs, symptoms, diagnosis, treatment and prognosis and additionally agrees to follow up as recommended with Dr. Caitie Arguelles MD in 24 - 48 hours. She also agrees with the care-plan and conveys that all of her questions have been answered. I have also put together some discharge instructions for her that include: 1) educational information regarding their diagnosis, 2) how to care for their diagnosis at home, as well a 3) list of reasons why they would want to return to the ED prior to their follow-up appointment, should their condition change. I have answered all questions to the patient's satisfaction. Strict return precautions given. She both understood and agreed with plan as discussed above. Vital signs stable for discharge. Disposition: DISCHARGE     The pt is ready for discharge. The pt's signs, symptoms, diagnosis, and discharge instructions have been discussed and pt has conveyed their understanding. The pt is to follow up as recommended or return to ER should their symptoms worsen. Plan has been discussed and pt is in agreement. PLAN:  1. Discharge Medication List as of 6/29/2019 12:49 AM      CONTINUE these medications which have NOT CHANGED    Details   pregabalin (LYRICA) 150 mg capsule Take 1 Cap by mouth two (2) times a day.  Max Daily Amount: 300 mg., Print, Disp-180 Cap, R-3      lamoTRIgine (LAMICTAL) 50 mg disintegrating tablet Take 1 Tab by mouth two (2) times a day., Normal, Disp-180 Tab, R-3      QUEtiapine (SEROQUEL) 50 mg tablet take 1 tablet by mouth NIGHTLY, Normal, Disp-90 Tab, R-3      methocarbamol (ROBAXIN) 750 mg tablet Take 1 Tab by mouth three (3) times daily. , Normal, Disp-270 Tab, R-3      acetaZOLAMIDE SR (DIAMOX) 500 mg capsule Take 1 Cap by mouth two (2) times a day., Normal, Disp-60 Cap, R-3      cyanocobalamin (VITAMIN B-12) 1,000 mcg/mL injection Intramuscular injection of 1000 mcg every week for 4 weeks then every month  Indications: inadequate vitamin B12, No Print, Disp-8 Vial, R-3      !! zonisamide (ZONEGRAN) 100 mg capsule Take two by mouth twice a day, Normal, Disp-120 Cap, R-5      ondansetron (ZOFRAN ODT) 4 mg disintegrating tablet Take 1 Tab by mouth every eight (8) hours as needed for Nausea., Normal, Disp-16 Tab, R-5      lamoTRIgine (LAMICTAL) 25 mg tablet Take 1 Tab by mouth daily. STAER: Take one daily for two weeks. Then twice a day for two weeks. ., Normal, Disp-42 Tab, R-0      lancets (ONETOUCH ULTRASOFT LANCETS) misc Use as directed, Normal, Disp-1 Package, R-11      atenolol (TENORMIN) 100 mg tablet Take 1 Tab by mouth daily. , Normal, Disp-90 Tab, R-3      metFORMIN (GLUCOPHAGE) 1,000 mg tablet take 1 tablet by mouth twice a day with MORNING AND EVENING MEALS, Normal, Disp-60 Tab, R-4      !! zonisamide (ZONEGRAN) 100 mg capsule take 2 capsules by mouth twice a day, Normal, Disp-120 Cap, R-3      !! erenumab-aooe (AIMOVIG AUTOINJECTOR, 2 PACK,) 70 mg/mL atIn 140 mg by SubCUTAneous route every thirty (30) days. , Sample, Disp-2 Syringe, R-0      !! erenumab-aooe (AIMOVIG AUTOINJECTOR, 2 PACK,) 70 mg/mL atIn 140 mg by SubCUTAneous route every thirty (30) days. , Normal, Disp-8 Syringe, R-3      !! erenumab-aooe (AIMOVIG AUTOINJECTOR, 2 PACK,) 70 mg/mL atIn 140 mg by SubCUTAneous route every thirty (30) days. , Normal, Disp-8 Syringe, R-3      Syringe-Needle, Safety,Disp Un 3 mL 25 gauge x 1\" syrg Use as directed, Normal, Disp-10 Pen Needle, R-1      fluticasone-salmeterol (ADVAIR DISKUS) 250-50 mcg/dose diskus inhaler Take 1 Puff by inhalation every twelve (12) hours. , Historical Med      acyclovir (ZOVIRAX) 200 mg capsule Take 200 mg by mouth two (2) times a day.  , Historical Med      diphenhydrAMINE (BENADRYL) 25 mg capsule Take 25 mg by mouth as needed. Indications: [facility]erigc to [facility] - only takes with evette, Historical Med       !! - Potential duplicate medications found. Please discuss with provider. 2.   Follow-up Information     Follow up With Specialties Details Why [address] [place] [address], [address] [place] [address], [address] [place], [place] [address]way [facility]  [phone]      [facility] EMERGENCY DEPT Emergency Medicine  As needed, If symptoms worsen [address] [place] [address] Drive  [address] [facility]  [phone]          Return to ED if worse  Diagnosis     Clinical Impression:   1. Urinary tract infection without hematuria, site unspecified    2. Abdominal pain, generalized    3. Nausea without vomiting    4. Accelerated hypertension        Attestation:  I personally performed the services described in this documentation on this date 6/28/2019 for patient Laly Solis. Mike Ham MD    Please note that this dictation was completed with [employer], the computer voice recognition software. Quite often unanticipated grammatical, syntax, homophones, and other interpretive errors are inadvertently transcribed by the computer software. Please disregard these errors. Please excuse any errors that have escaped final proofreading. This note will not be viewable in [address]

## 2019-06-30 LAB
BACTERIA SPEC CULT: NORMAL
CC UR VC: NORMAL
SERVICE CMNT-IMP: NORMAL

## 2019-07-10 ENCOUNTER — HOSPITAL ENCOUNTER (EMERGENCY)
Age: 44
Discharge: HOME | End: 2019-07-10
Attending: EMERGENCY MEDICINE
Payer: COMMERCIAL

## 2019-07-10 VITALS
OXYGEN SATURATION: 99 % | SYSTOLIC BLOOD PRESSURE: 140 MMHG | TEMPERATURE: 97.8 F | RESPIRATION RATE: 16 BRPM | DIASTOLIC BLOOD PRESSURE: 71 MMHG | HEART RATE: 62 BPM

## 2019-07-10 DIAGNOSIS — R55 SYNCOPE, UNSPECIFIED SYNCOPE TYPE: Primary | ICD-10-CM

## 2019-07-10 DIAGNOSIS — R51.9 CHRONIC INTRACTABLE HEADACHE, UNSPECIFIED HEADACHE TYPE: ICD-10-CM

## 2019-07-10 DIAGNOSIS — G89.29 CHRONIC INTRACTABLE HEADACHE, UNSPECIFIED HEADACHE TYPE: ICD-10-CM

## 2019-07-10 LAB
ALBUMIN SERPL-MCNC: 3 G/DL (ref 3.5–5)
ALBUMIN/GLOB SERPL: 0.8 {RATIO} (ref 1.1–2.2)
ALP SERPL-CCNC: 97 U/L (ref 45–117)
ALT SERPL-CCNC: 43 U/L (ref 12–78)
ANION GAP SERPL CALC-SCNC: 5 MMOL/L (ref 5–15)
AST SERPL-CCNC: 22 U/L (ref 15–37)
ATRIAL RATE: 54 BPM
BASOPHILS # BLD: 0 K/UL (ref 0–0.1)
BASOPHILS NFR BLD: 0 % (ref 0–1)
BILIRUB SERPL-MCNC: 0.3 MG/DL (ref 0.2–1)
BUN SERPL-MCNC: 12 MG/DL (ref 6–20)
BUN/CREAT SERPL: 14 (ref 12–20)
CALCIUM SERPL-MCNC: 9.1 MG/DL (ref 8.5–10.1)
CALCULATED P AXIS, ECG09: 26 DEGREES
CALCULATED R AXIS, ECG10: 12 DEGREES
CALCULATED T AXIS, ECG11: 45 DEGREES
CHLORIDE SERPL-SCNC: 112 MMOL/L (ref 97–108)
CO2 SERPL-SCNC: 25 MMOL/L (ref 21–32)
COMMENT, HOLDF: NORMAL
CREAT SERPL-MCNC: 0.88 MG/DL (ref 0.55–1.02)
DIAGNOSIS, 93000: NORMAL
DIFFERENTIAL METHOD BLD: ABNORMAL
EOSINOPHIL # BLD: 0.2 K/UL (ref 0–0.4)
EOSINOPHIL NFR BLD: 2 % (ref 0–7)
ERYTHROCYTE [DISTWIDTH] IN BLOOD BY AUTOMATED COUNT: 14.8 % (ref 11.5–14.5)
GLOBULIN SER CALC-MCNC: 3.7 G/DL (ref 2–4)
GLUCOSE SERPL-MCNC: 94 MG/DL (ref 65–100)
HCT VFR BLD AUTO: 45 % (ref 35–47)
HGB BLD-MCNC: 13.7 G/DL (ref 11.5–16)
IMM GRANULOCYTES # BLD AUTO: 0 K/UL (ref 0–0.04)
IMM GRANULOCYTES NFR BLD AUTO: 0 % (ref 0–0.5)
LYMPHOCYTES # BLD: 2.1 K/UL (ref 0.8–3.5)
LYMPHOCYTES NFR BLD: 23 % (ref 12–49)
MCH RBC QN AUTO: 28.2 PG (ref 26–34)
MCHC RBC AUTO-ENTMCNC: 30.4 G/DL (ref 30–36.5)
MCV RBC AUTO: 92.8 FL (ref 80–99)
MONOCYTES # BLD: 0.6 K/UL (ref 0–1)
MONOCYTES NFR BLD: 7 % (ref 5–13)
NEUTS SEG # BLD: 6.2 K/UL (ref 1.8–8)
NEUTS SEG NFR BLD: 68 % (ref 32–75)
NRBC # BLD: 0 K/UL (ref 0–0.01)
NRBC BLD-RTO: 0 PER 100 WBC
P-R INTERVAL, ECG05: 152 MS
PLATELET # BLD AUTO: 174 K/UL (ref 150–400)
PMV BLD AUTO: 11.4 FL (ref 8.9–12.9)
POTASSIUM SERPL-SCNC: 4.2 MMOL/L (ref 3.5–5.1)
PROT SERPL-MCNC: 6.7 G/DL (ref 6.4–8.2)
Q-T INTERVAL, ECG07: 432 MS
QRS DURATION, ECG06: 82 MS
QTC CALCULATION (BEZET), ECG08: 409 MS
RBC # BLD AUTO: 4.85 M/UL (ref 3.8–5.2)
SAMPLES BEING HELD,HOLD: NORMAL
SODIUM SERPL-SCNC: 142 MMOL/L (ref 136–145)
TSH SERPL DL<=0.05 MIU/L-ACNC: 0.78 UIU/ML (ref 0.36–3.74)
VENTRICULAR RATE, ECG03: 54 BPM
WBC # BLD AUTO: 9.2 K/UL (ref 3.6–11)

## 2019-07-10 PROCEDURE — 80053 COMPREHEN METABOLIC PANEL: CPT

## 2019-07-10 PROCEDURE — 96374 THER/PROPH/DIAG INJ IV PUSH: CPT

## 2019-07-10 PROCEDURE — 36415 COLL VENOUS BLD VENIPUNCTURE: CPT

## 2019-07-10 PROCEDURE — 74011000258 HC RX REV CODE- 258: Performed by: EMERGENCY MEDICINE

## 2019-07-10 PROCEDURE — 85025 COMPLETE CBC W/AUTO DIFF WBC: CPT

## 2019-07-10 PROCEDURE — 74011250636 HC RX REV CODE- 250/636: Performed by: EMERGENCY MEDICINE

## 2019-07-10 PROCEDURE — 84443 ASSAY THYROID STIM HORMONE: CPT

## 2019-07-10 PROCEDURE — 93005 ELECTROCARDIOGRAM TRACING: CPT

## 2019-07-10 PROCEDURE — 99284 EMERGENCY DEPT VISIT MOD MDM: CPT

## 2019-07-10 RX ADMIN — PROCHLORPERAZINE EDISYLATE 10 MG: 5 INJECTION INTRAMUSCULAR; INTRAVENOUS at 15:04

## 2019-07-10 NOTE — ED TRIAGE NOTES
Patient arrives from Dr. Chloe Pereira office ( pain control doctor). She was given a steroid injection in her back and soon after had a syncope episode that lasted about 5 minutes per office staff. 911 was called, patient is alert to painful stimuli and will try to mumble words. 10 minutes post arrival to the ER, patient became more responsive and has been able to hold her head off the bed and respond in full, appropriate sentences. Patient was asked to take off the 30+ bracelets  on her right arm and she was able to do so. Her father at bedside and is a good support for the patient.

## 2019-07-10 NOTE — DISCHARGE INSTRUCTIONS
Patient Education        Fainting: Care Instructions  Your Care Instructions    When you faint, or pass out, you lose consciousness for a short time. A brief drop in blood flow to the brain often causes it. When you fall or lie down, more blood flows to your brain and you regain consciousness. Emotional stress, pain, or overheating--especially if you have been standing--can make you faint. In these cases, fainting is usually not serious. But fainting can be a sign of a more serious problem. Your doctor may want you to have more tests to rule out other causes. The treatment you need depends on the reason why you fainted. The doctor has checked you carefully, but problems can develop later. If you notice any problems or new symptoms, get medical treatment right away. Follow-up care is a key part of your treatment and safety. Be sure to make and go to all appointments, and call your doctor if you are having problems. It's also a good idea to know your test results and keep a list of the medicines you take. How can you care for yourself at home? · Drink plenty of fluids to prevent dehydration. If you have kidney, heart, or liver disease and have to limit fluids, talk with your doctor before you increase your fluid intake. When should you call for help? Call 911 anytime you think you may need emergency care. For example, call if:    · You have symptoms of a heart problem. These may include:  ? Chest pain or pressure. ? Severe trouble breathing. ? A fast or irregular heartbeat. ? Lightheadedness or sudden weakness. ? Coughing up pink, foamy mucus. ? Passing out. After you call 911, the  may tell you to chew 1 adult-strength or 2 to 4 low-dose aspirin. Wait for an ambulance. Do not try to drive yourself.     · You have symptoms of a stroke. These may include:  ? Sudden numbness, tingling, weakness, or loss of movement in your face, arm, or leg, especially on only one side of your body.   ? Sudden vision changes. ? Sudden trouble speaking. ? Sudden confusion or trouble understanding simple statements. ? Sudden problems with walking or balance. ? A sudden, severe headache that is different from past headaches.     · You passed out (lost consciousness) again.    Watch closely for changes in your health, and be sure to contact your doctor if:    · You do not get better as expected. Where can you learn more? Go to http://dusty-ari.info/. Enter Y044 in the search box to learn more about \"Fainting: Care Instructions. \"  Current as of: September 23, 2018  Content Version: 11.9  © 7561-0493 [employer]. Care instructions adapted under license by [employer] (which disclaims liability or warranty for this information). If you have questions about a medical condition or this instruction, always ask your healthcare professional. [address] any warranty or liability for your use of this information.

## 2019-07-10 NOTE — ED PROVIDER NOTES
37 y.o. female with past medical history significant for asthma, HTN, diabetes, seizures, psuedotumor, migraines, sleep apnea, fibromyalgia who presents from Dr. Jluis Aguillon office via EMS for evaluation s/p syncope. Pt was at her Pain Management f/u with Dr. Estefania Mooney and after getting a steroid injection in her back she became lightheaded. She was sitting in the recovery area and the nurse took her BP and she was found to be hypotensive. Pt then experienced a syncopal episode and states that she doesn't remember anything after that until she arrived at the ED. Pt has progressively improved since arriving; however, she states that she is still lightheaded and experiencing a headache. Pt has a h/o pseudotumor and states that she gets frequent migraines. She is followed by Dr. Gabriele Higuera, Neurology and states that she has been compliant with all of her medications. Pt notes that she had a slight headache prior to her injection this morning but it has now worsened. Pt denies fever or chills. There are no other acute medical concerns at this time. Social hx: no tobacco use; occasional EtOH use   PCP: Davie Santos MD    Note written by Georgie Duverney, Scribe, as dictated by Bia Mendez MD 2:23 PM    The history is provided by the patient. No  was used.         Past Medical History:   Diagnosis Date    Asthma     Diabetes ([address]     Fibromyalgia     Hypertension     Lower back injury 02/13/2017    Migraines     Nausea and vomiting 1/14/2014    Other ill-defined conditions(799.89)     cerebral tumor, endometriosis, fibromyalgia, herpes    Right lower quadrant abdominal mass 1/14/2014    Seizures ([address]     Sleep apnea     intolerant to CPAP    Unspecified adverse effect of anesthesia     pt states that she has an asthma attack when coming out of  anesthesia       Past Surgical History:   Procedure Laterality Date    HX CHOLECYSTECTOMY      HX COLECTOMY  3/14/2013    Rt colectomy for perf transverse diverticulum    HX HEENT      wisdom teeth    HX OTHER SURGICAL      laproscopy on ovaries    HX OTHER SURGICAL      left wrist ganglion cystectomy    HX OTHER SURGICAL      lung mass removed         Family History:   Problem Relation Age of Onset    Other Other         unable to obtain due to AMS    Hypertension Father     Heart Disease Father     Cancer Father     Hypertension Mother     Diabetes Maternal Grandmother     Diabetes Maternal Grandfather        Social History     Socioeconomic History    Marital status: LEGALLY      Spouse name: Not on file    Number of children: Not on file    Years of education: Not on file    Highest education level: Not on file   Occupational History    Not on file   Social Needs    Financial resource strain: Not on file    Food insecurity:     Worry: Not on file     Inability: Not on file    Transportation needs:     Medical: Not on file     Non-medical: Not on file   Tobacco Use    Smoking status: Never Smoker    Smokeless tobacco: Never Used   Substance and Sexual Activity    Alcohol use: Yes     Comment: ocasionally    Drug use: No    Sexual activity: Not on file   Lifestyle    Physical activity:     Days per week: Not on file     Minutes per session: Not on file    Stress: Not on file   Relationships    Social connections:     Talks on phone: Not on file     Gets together: Not on file     Attends Judaism service: Not on file     Active member of club or organization: Not on file     Attends meetings of clubs or organizations: Not on file     Relationship status: Not on file    Intimate partner violence:     Fear of current or ex partner: Not on file     Emotionally abused: Not on file     Physically abused: Not on file     Forced sexual activity: Not on file   Other Topics Concern    Not on file   Social History Narrative    Not on file         ALLERGIES: Lemon;  Adhesive tape-silicones; Dilaudid [hydromorphone (pf)]; Fentanyl; and Percocet [oxycodone-acetaminophen]    Review of Systems   Constitutional: Negative for appetite change, chills and fever. HENT: Negative for rhinorrhea, sore throat and trouble swallowing. Eyes: Negative for photophobia. Respiratory: Negative for cough and shortness of breath. Cardiovascular: Negative for chest pain and palpitations. Gastrointestinal: Negative for abdominal pain, nausea and vomiting. Genitourinary: Negative for dysuria, frequency and hematuria. Musculoskeletal: Negative for arthralgias. Neurological: Positive for syncope, light-headedness and headaches. Negative for dizziness and weakness. Psychiatric/Behavioral: Negative for behavioral problems. The patient is not nervous/anxious. All other systems reviewed and are negative. Vitals:    07/10/19 1256   BP: 152/84   Pulse: (!) 54   Resp: 15   Temp: 98.7 °F (37.1 °C)   SpO2: 99%            Physical Exam   Constitutional: She appears well-developed and well-nourished. HENT:   Head: Normocephalic and atraumatic. Mouth/Throat: Oropharynx is clear and moist.   Eyes: Pupils are equal, round, and reactive to light. EOM are normal.   Neck: Normal range of motion. Neck supple. Cardiovascular: Normal rate, regular rhythm, normal heart sounds and intact distal pulses. Exam reveals no gallop and no friction rub. No murmur heard. HR 55.    Pulmonary/Chest: Effort normal. No respiratory distress. She has no wheezes. She has no rales. Abdominal: Soft. There is no tenderness. There is no rebound. Musculoskeletal: Normal range of motion. She exhibits no tenderness. Neurological: She is alert. No cranial nerve deficit. Motor; symmetric   Skin: No erythema. Psychiatric: She has a normal mood and affect. Her behavior is normal.   Nursing note and vitals reviewed.     Note written by Marcio Peng, as dictated by Radha Antoine MD 2:24 PM     MDM       Procedures          Note: Patient is on multiple medications and has pseudo-tumor cerebri. She also has a chronic headache disorder. She takes eranumab and many other medications. Patient had an injection for chronic back pain. She went to he recovery room. Her blood pressure was noted to be quite low. Patient felt lightheaded and fainted. She has gradually improved in the emergency department. There is no known seizure activity and episode was witnessed. Gabriela Dodson MD  2:29 PM       ED EKG interpretation:  Rhythm: sinus bradycardia; and regular . Rate (approx.): 55; Axis: normal; P wave: normal; QRS interval: normal ; ST/T wave: normal; in  Lead: ; Other findings: . This EKG was interpreted by Gabriela Dodson MD,ED Provider.  3:00 PM

## 2019-07-10 NOTE — ED NOTES
Discharge instructions given to patient by MD. Pt has been given counseling and verbalizes understanding. IV d/c. Pt ambulated off of unit in no signs of distress.

## 2019-07-11 ENCOUNTER — HOSPITAL ENCOUNTER (OUTPATIENT)
Dept: NEUROLOGY | Age: 44
Discharge: HOME | End: 2019-07-11
Attending: PSYCHIATRY & NEUROLOGY
Payer: COMMERCIAL

## 2019-07-11 DIAGNOSIS — R41.0 DISORIENTATION: ICD-10-CM

## 2019-07-11 PROCEDURE — 95953 NEURO EEG 24 HR: CPT

## 2019-07-13 LAB
ALBUMIN SERPL-MCNC: 3.5 G/DL (ref 3.5–5.5)
ALBUMIN/GLOB SERPL: 1.6 {RATIO} (ref 1.2–2.2)
ALP SERPL-CCNC: 80 IU/L (ref 39–117)
ALT SERPL-CCNC: 25 IU/L (ref 0–32)
AST SERPL-CCNC: 15 IU/L (ref 0–40)
BILIRUB SERPL-MCNC: <0.2 MG/DL (ref 0–1.2)
BUN SERPL-MCNC: 18 MG/DL (ref 6–24)
BUN/CREAT SERPL: 18 (ref 9–23)
CALCIUM SERPL-MCNC: 8.9 MG/DL (ref 8.7–10.2)
CHLORIDE SERPL-SCNC: 113 MMOL/L (ref 96–106)
CO2 SERPL-SCNC: 21 MMOL/L (ref 20–29)
CREAT SERPL-MCNC: 1.01 MG/DL (ref 0.57–1)
ERYTHROCYTE [DISTWIDTH] IN BLOOD BY AUTOMATED COUNT: 15.5 % (ref 12.3–15.4)
GLOBULIN SER CALC-MCNC: 2.2 G/DL (ref 1.5–4.5)
GLUCOSE SERPL-MCNC: 81 MG/DL (ref 65–99)
HCT VFR BLD AUTO: 41.7 % (ref 34–46.6)
HGB BLD-MCNC: 13 G/DL (ref 11.1–15.9)
MCH RBC QN AUTO: 28.4 PG (ref 26.6–33)
MCHC RBC AUTO-ENTMCNC: 31.2 G/DL (ref 31.5–35.7)
MCV RBC AUTO: 91 FL (ref 79–97)
PLATELET # BLD AUTO: 189 X10E3/UL (ref 150–450)
POTASSIUM SERPL-SCNC: 3.8 MMOL/L (ref 3.5–5.2)
PROT SERPL-MCNC: 5.7 G/DL (ref 6–8.5)
RBC # BLD AUTO: 4.57 X10E6/UL (ref 3.77–5.28)
SODIUM SERPL-SCNC: 146 MMOL/L (ref 134–144)
WBC # BLD AUTO: 8.5 X10E3/UL (ref 3.4–10.8)

## 2019-07-19 ENCOUNTER — DOCUMENTATION ONLY (OUTPATIENT)
Dept: NEUROLOGY | Age: 44
End: 2019-07-19

## 2019-07-19 DIAGNOSIS — R41.3 MEMORY LOSS: ICD-10-CM

## 2019-07-19 DIAGNOSIS — R10.84 GENERALIZED ABDOMINAL PAIN: Primary | ICD-10-CM

## 2019-07-19 DIAGNOSIS — Z98.84 HX OF BARIATRIC SURGERY: ICD-10-CM

## 2019-07-19 NOTE — TELEPHONE ENCOUNTER
Received a fax request for a 90 day supply for this script. Future Appointments   Date Time Provider Blake Soi   8/15/2019 10:40 AM Wilda Traore MD [address]                         Last Appointment My Department:  5/1/2019    Would you like to refill below? Requested Prescriptions     Pending Prescriptions Disp Refills    acetaZOLAMIDE SR (DIAMOX) 500 mg capsule 180 Cap 3     Sig: Take 1 Cap by mouth two (2) times a day.

## 2019-07-19 NOTE — PROGRESS NOTES
Called patient. She has been suffering abdominal pain. She has been suffering memory loss and is having someone  for her. She feels generally ill and unwell. RDW levels are creeping up.

## 2019-07-22 RX ORDER — ACETAZOLAMIDE 500 MG/1
500 CAPSULE, EXTENDED RELEASE ORAL 2 TIMES DAILY
Qty: 180 CAP | Refills: 3 | OUTPATIENT
Start: 2019-07-22

## 2019-07-23 ENCOUNTER — TELEPHONE (OUTPATIENT)
Dept: NEUROLOGY | Age: 44
End: 2019-07-23

## 2019-07-23 NOTE — TELEPHONE ENCOUNTER
Patient called in regards to  calling her on Friday and wanting her to get blood work asap. She has not heard anything and he wanted her to have it done.

## 2019-07-23 NOTE — TELEPHONE ENCOUNTER
Refused Medications      acetaZOLAMIDE SR (DIAMOX) 500 mg capsule         Sig: Take 1 Cap by mouth two (2) times a day.     Disp:  180 Cap    Refills:  3    Start: 7/22/2019    Class: Normal    Refused by: Charli Richards MD    Refusal reason: Medication Discontinued        Fill requested from: [id number]

## 2019-07-25 ENCOUNTER — HOSPITAL ENCOUNTER (OUTPATIENT)
Dept: ULTRASOUND IMAGING | Age: 44
Discharge: HOME | End: 2019-07-25
Attending: PSYCHIATRY & NEUROLOGY
Payer: COMMERCIAL

## 2019-07-25 DIAGNOSIS — Z98.84 HX OF BARIATRIC SURGERY: ICD-10-CM

## 2019-07-25 DIAGNOSIS — R10.84 GENERALIZED ABDOMINAL PAIN: ICD-10-CM

## 2019-07-25 PROCEDURE — 76705 ECHO EXAM OF ABDOMEN: CPT

## 2019-07-26 LAB
ALBUMIN SERPL-MCNC: 3.9 G/DL (ref 3.5–5.5)
ALBUMIN/GLOB SERPL: 1.6 {RATIO} (ref 1.2–2.2)
ALP SERPL-CCNC: 86 IU/L (ref 39–117)
ALT SERPL-CCNC: 16 IU/L (ref 0–32)
AMMONIA PLAS-MCNC: 78 UG/DL (ref 19–87)
AST SERPL-CCNC: 16 IU/L (ref 0–40)
BILIRUB SERPL-MCNC: 0.2 MG/DL (ref 0–1.2)
BUN SERPL-MCNC: 21 MG/DL (ref 6–24)
BUN/CREAT SERPL: 24 (ref 9–23)
CALCIUM SERPL-MCNC: 9 MG/DL (ref 8.7–10.2)
CHLORIDE SERPL-SCNC: 112 MMOL/L (ref 96–106)
CO2 SERPL-SCNC: 22 MMOL/L (ref 20–29)
CREAT SERPL-MCNC: 0.86 MG/DL (ref 0.57–1)
ERYTHROCYTE [DISTWIDTH] IN BLOOD BY AUTOMATED COUNT: 14.7 % (ref 12.3–15.4)
FERRITIN SERPL-MCNC: 52 NG/ML (ref 15–150)
FOLATE SERPL-MCNC: >20 NG/ML
GGT SERPL-CCNC: 23 IU/L (ref 0–60)
GLOBULIN SER CALC-MCNC: 2.4 G/DL (ref 1.5–4.5)
GLUCOSE SERPL-MCNC: 84 MG/DL (ref 65–99)
HCT VFR BLD AUTO: 41.8 % (ref 34–46.6)
HGB BLD-MCNC: 12.6 G/DL (ref 11.1–15.9)
INR PPP: 1 (ref 0.8–1.2)
MCH RBC QN AUTO: 27 PG (ref 26.6–33)
MCHC RBC AUTO-ENTMCNC: 30.1 G/DL (ref 31.5–35.7)
MCV RBC AUTO: 90 FL (ref 79–97)
PLATELET # BLD AUTO: 242 X10E3/UL (ref 150–450)
POTASSIUM SERPL-SCNC: 4.2 MMOL/L (ref 3.5–5.2)
PROT SERPL-MCNC: 6.3 G/DL (ref 6–8.5)
PROTHROMBIN TIME: 10.2 SEC (ref 9.1–12)
RBC # BLD AUTO: 4.67 X10E6/UL (ref 3.77–5.28)
SODIUM SERPL-SCNC: 146 MMOL/L (ref 134–144)
TRANSFERRIN SERPL-MCNC: 209 MG/DL (ref 200–370)
VIT B12 SERPL-MCNC: 1502 PG/ML (ref 232–1245)
WBC # BLD AUTO: 6.3 X10E3/UL (ref 3.4–10.8)

## 2019-08-02 ENCOUNTER — TELEPHONE (OUTPATIENT)
Dept: NEUROLOGY | Age: 44
End: 2019-08-02

## 2019-08-02 NOTE — TELEPHONE ENCOUNTER
----- Message from [facility] sent at 8/2/2019 11:36 AM EDT -----  Regarding: Dr Lisbeth Ayon telephone  The pt is requesting a callback to receive her blood work and ultrasound results.       Best contact number is [phone]

## 2019-08-15 ENCOUNTER — OFFICE VISIT (OUTPATIENT)
Dept: NEUROLOGY | Age: 44
End: 2019-08-15

## 2019-08-15 VITALS
SYSTOLIC BLOOD PRESSURE: 132 MMHG | HEIGHT: 62 IN | OXYGEN SATURATION: 97 % | HEART RATE: 84 BPM | DIASTOLIC BLOOD PRESSURE: 70 MMHG | BODY MASS INDEX: 42.88 KG/M2 | WEIGHT: 233 LBS

## 2019-08-15 DIAGNOSIS — F51.03 PARADOXICAL INSOMNIA: ICD-10-CM

## 2019-08-15 DIAGNOSIS — G47.33 OBSTRUCTIVE SLEEP APNEA SYNDROME: Primary | ICD-10-CM

## 2019-08-15 DIAGNOSIS — G93.2 PSEUDOTUMOR CEREBRI SYNDROME: ICD-10-CM

## 2019-08-15 DIAGNOSIS — M79.7 FIBROMYALGIA: ICD-10-CM

## 2019-08-15 DIAGNOSIS — G43.711 CHRONIC MIGRAINE WITHOUT AURA, WITH INTRACTABLE MIGRAINE, SO STATED, WITH STATUS MIGRAINOSUS: ICD-10-CM

## 2019-08-15 NOTE — LETTER
8/15/2019 11:15 AM 
 
Ms. Buck Pierce [address] [place] [address] To whom it may concern;  
 
Buck Pierce will be out of work on Friday 8/16/19 and Saturday 8/17/19 Any questions or concerns please contact our office at [phone] Sincerely, Goldy Nowak MD

## 2019-08-15 NOTE — PATIENT INSTRUCTIONS
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
## 2019-11-05 ENCOUNTER — TELEPHONE (OUTPATIENT)
Dept: SURGERY | Age: 44
End: 2019-11-05

## 2019-11-05 NOTE — TELEPHONE ENCOUNTER
Spoke with patient requesting to see Dr. Silvia Mcgee S/P exp lap,rt colectomy ileocolostomy 3/4/13, was told by another provider to see GI regarding polyps. Encouraged patient to see GI first and if she need a surgeon they will consult Dr. Silvia Mcgee. Express understanding.

## 2019-11-05 NOTE — TELEPHONE ENCOUNTER
Patient is calling had surgery with Dr Duran White, has been having pain and was told she has polyps on her intestines, shes been bleeding when she goes to the bathroom. She states she was told to see GI doctor but wants to schedule with Dr Duran White, please call patient to let her know who she should see.

## 2019-11-27 ENCOUNTER — HOSPITAL ENCOUNTER (OUTPATIENT)
Dept: CT IMAGING | Age: 44
Discharge: HOME | End: 2019-11-27
Attending: INTERNAL MEDICINE
Payer: COMMERCIAL

## 2019-11-27 DIAGNOSIS — R11.0 NAUSEA: ICD-10-CM

## 2019-11-27 DIAGNOSIS — K57.92 DIVERTICULITIS: ICD-10-CM

## 2019-11-27 DIAGNOSIS — K92.1 HEMATOCHEZIA: ICD-10-CM

## 2019-11-27 PROCEDURE — 74177 CT ABD & PELVIS W/CONTRAST: CPT

## 2019-11-27 PROCEDURE — 74011636320 HC RX REV CODE- 636/320: Performed by: INTERNAL MEDICINE

## 2019-11-27 PROCEDURE — 82565 ASSAY OF CREATININE: CPT

## 2019-11-27 RX ORDER — SODIUM CHLORIDE 0.9 % (FLUSH) 0.9 %
10 SYRINGE (ML) INJECTION
Status: COMPLETED | OUTPATIENT
Start: 2019-11-27 | End: 2019-11-27

## 2019-11-27 RX ADMIN — Medication 10 ML: at 09:21

## 2019-11-27 RX ADMIN — IOPAMIDOL 100 ML: 755 INJECTION, SOLUTION INTRAVENOUS at 09:21

## 2019-11-27 RX ADMIN — IOHEXOL 50 ML: 240 INJECTION, SOLUTION INTRATHECAL; INTRAVASCULAR; INTRAVENOUS; ORAL at 09:21

## 2019-11-29 LAB — CREAT BLD-MCNC: 1.1 MG/DL (ref 0.6–1.3)

## 2019-12-06 DIAGNOSIS — F51.01 PRIMARY INSOMNIA: ICD-10-CM

## 2019-12-06 RX ORDER — QUETIAPINE FUMARATE 50 MG/1
TABLET, FILM COATED ORAL
Qty: 90 TAB | Refills: 1 | Status: SHIPPED | OUTPATIENT
Start: 2019-12-06 | End: 2020-03-11 | Stop reason: SDUPTHER

## 2019-12-10 DIAGNOSIS — M79.7 FIBROMYALGIA: ICD-10-CM

## 2019-12-16 DIAGNOSIS — M79.7 FIBROMYALGIA: ICD-10-CM

## 2019-12-16 NOTE — TELEPHONE ENCOUNTER
Received a refill request from [employer] for    Requested Prescriptions     Pending Prescriptions Disp Refills    pregabalin (LYRICA) 150 mg capsule 180 Cap 3     Sig: Take 1 Cap by mouth two (2) times a day. Max Daily Amount: 300 mg.      Future Appointments   Date Time Provider Blake Ayers   3/11/2020  8:40 AM Tiana Tomas MD [address] [place] [address]                         Last Appointment My Department:  12/3/2019    Please review

## 2019-12-19 RX ORDER — PREGABALIN 150 MG/1
150 CAPSULE ORAL 2 TIMES DAILY
Qty: 180 CAP | Refills: 3 | Status: SHIPPED | OUTPATIENT
Start: 2019-12-19 | End: 2020-03-11 | Stop reason: SDUPTHER

## 2020-03-11 ENCOUNTER — OFFICE VISIT (OUTPATIENT)
Dept: NEUROLOGY | Age: 45
End: 2020-03-11

## 2020-03-11 VITALS
HEIGHT: 62 IN | WEIGHT: 229 LBS | BODY MASS INDEX: 42.14 KG/M2 | SYSTOLIC BLOOD PRESSURE: 112 MMHG | DIASTOLIC BLOOD PRESSURE: 72 MMHG

## 2020-03-11 DIAGNOSIS — F51.01 PRIMARY INSOMNIA: ICD-10-CM

## 2020-03-11 DIAGNOSIS — M25.552 PAIN OF LEFT HIP JOINT: Primary | ICD-10-CM

## 2020-03-11 DIAGNOSIS — G43.711 INTRACTABLE CHRONIC MIGRAINE WITHOUT AURA AND WITH STATUS MIGRAINOSUS: ICD-10-CM

## 2020-03-11 DIAGNOSIS — M79.7 FIBROMYALGIA: ICD-10-CM

## 2020-03-11 DIAGNOSIS — E53.8 B12 DEFICIENCY: ICD-10-CM

## 2020-03-11 DIAGNOSIS — E11.9 TYPE 2 DIABETES MELLITUS WITHOUT COMPLICATION, WITHOUT LONG-TERM CURRENT USE OF INSULIN (HCC): ICD-10-CM

## 2020-03-11 RX ORDER — BUSPIRONE HYDROCHLORIDE 5 MG/1
15 TABLET ORAL 2 TIMES DAILY
COMMUNITY
End: 2022-07-13 | Stop reason: SDUPTHER

## 2020-03-11 RX ORDER — OMEPRAZOLE 20 MG/1
20 CAPSULE, DELAYED RELEASE ORAL DAILY
COMMUNITY

## 2020-03-11 RX ORDER — CYCLOBENZAPRINE HCL 10 MG
10 TABLET ORAL
Qty: 90 TAB | Refills: 3 | Status: SHIPPED | OUTPATIENT
Start: 2020-03-11 | End: 2021-07-02

## 2020-03-11 RX ORDER — CYANOCOBALAMIN 1000 UG/ML
INJECTION, SOLUTION INTRAMUSCULAR; SUBCUTANEOUS
Qty: 8 VIAL | Refills: 3
Start: 2020-03-11 | End: 2020-07-21 | Stop reason: SDUPTHER

## 2020-03-11 RX ORDER — METFORMIN HYDROCHLORIDE 1000 MG/1
TABLET ORAL
Qty: 60 TAB | Refills: 4 | Status: SHIPPED | OUTPATIENT
Start: 2020-03-11 | End: 2020-04-03

## 2020-03-11 RX ORDER — ACETAZOLAMIDE 500 MG/1
500 CAPSULE, EXTENDED RELEASE ORAL 2 TIMES DAILY
Qty: 60 CAP | Refills: 5 | Status: SHIPPED | OUTPATIENT
Start: 2020-03-11 | End: 2021-01-14

## 2020-03-11 RX ORDER — ZONISAMIDE 100 MG/1
CAPSULE ORAL
Qty: 120 CAP | Refills: 5 | Status: SHIPPED | OUTPATIENT
Start: 2020-03-11 | End: 2020-07-21 | Stop reason: SDUPTHER

## 2020-03-11 RX ORDER — ERENUMAB-AOOE 140 MG/ML
140 INJECTION, SOLUTION SUBCUTANEOUS ONCE
Qty: 1 EACH | Refills: 11 | Status: SHIPPED | OUTPATIENT
Start: 2020-03-11 | End: 2021-04-01

## 2020-03-11 RX ORDER — QUETIAPINE FUMARATE 50 MG/1
TABLET, FILM COATED ORAL
Qty: 90 TAB | Refills: 3 | Status: SHIPPED | OUTPATIENT
Start: 2020-03-11 | End: 2020-07-21 | Stop reason: SDUPTHER

## 2020-03-11 RX ORDER — PREGABALIN 150 MG/1
150 CAPSULE ORAL 2 TIMES DAILY
Qty: 180 CAP | Refills: 3 | Status: SHIPPED | OUTPATIENT
Start: 2020-03-11 | End: 2020-07-21 | Stop reason: SDUPTHER

## 2020-03-11 RX ORDER — TRAZODONE HYDROCHLORIDE 100 MG/1
100 TABLET ORAL
Qty: 30 TAB | Refills: 5 | Status: SHIPPED | OUTPATIENT
Start: 2020-03-11 | End: 2020-07-21 | Stop reason: SDUPTHER
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
## 2020-03-11 NOTE — LETTER
3/11/2020 9:34 AM 
 
Ms. Phyllis Aguilar [address] [place] [address] To whom it may concern, 
 
Ms. Meron Ibanez was seen in clinic for a serious medical condition and was advised not to return to work for the day. She may return to work tomorrow without restrictions. Sincerely, Aby Sanchez MD

## 2020-03-23 ENCOUNTER — HOSPITAL ENCOUNTER (OUTPATIENT)
Dept: CT IMAGING | Age: 45
Discharge: HOME | End: 2020-03-23
Attending: PSYCHIATRY & NEUROLOGY
Payer: COMMERCIAL

## 2020-03-23 DIAGNOSIS — M25.552 PAIN OF LEFT HIP JOINT: ICD-10-CM

## 2020-03-23 PROCEDURE — 72192 CT PELVIS W/O DYE: CPT

## 2020-03-23 PROCEDURE — 73701 CT LOWER EXTREMITY W/DYE: CPT

## 2020-04-03 DIAGNOSIS — E11.9 TYPE 2 DIABETES MELLITUS WITHOUT COMPLICATION, WITHOUT LONG-TERM CURRENT USE OF INSULIN (HCC): ICD-10-CM

## 2020-04-03 RX ORDER — METFORMIN HYDROCHLORIDE 1000 MG/1
TABLET ORAL
Qty: 240 TAB | Refills: 1 | Status: SHIPPED | OUTPATIENT
Start: 2020-04-03 | End: 2021-05-14 | Stop reason: SDUPTHER

## 2020-04-16 ENCOUNTER — TELEPHONE (OUTPATIENT)
Dept: NEUROLOGY | Age: 45
End: 2020-04-16

## 2020-06-23 ENCOUNTER — TELEPHONE (OUTPATIENT)
Dept: NEUROLOGY | Age: 45
End: 2020-06-23

## 2020-06-23 NOTE — TELEPHONE ENCOUNTER
----- Message from Keltontyree Romeo sent at 2020 11:52 AM EDT -----  Regarding: JANINA/TELEPHONE  Contact: [phone]  General Message/Vendor Call      Patient's first and last name: Walter Gil  : [phone]  ID numbers: #[id number]    Caller's first and last name:Catie Camarillo  Reason for call: Schedule an appt for Botox  Callback required yes/no and why: Yes  Best contact number(s): [phone]  Details to clarify the request: Patient requesting to schedule an appt for Botox.

## 2020-06-24 ENCOUNTER — TELEPHONE (OUTPATIENT)
Dept: NEUROLOGY | Age: 45
End: 2020-06-24

## 2020-06-24 NOTE — TELEPHONE ENCOUNTER
----- Message from Lizeth Cronin sent at 6/24/2020  4:07 PM EDT -----  Regarding: dr sams/ addison  General Message/Vendor Calls    Caller's first and last name: pt      Reason for call: she needs to get her botox appt       Callback required yes/no and why: yes      Best contact number(s): [phone]      Details to clarify the request:      Roselia Hughes

## 2020-07-13 ENCOUNTER — TELEPHONE (OUTPATIENT)
Dept: NEUROLOGY | Age: 45
End: 2020-07-13

## 2020-07-13 NOTE — TELEPHONE ENCOUNTER
Patient is listed on Botox schedule for 7-31-20. I was unaware of this order. No Rx in the chart. Please advise.

## 2020-07-14 RX ORDER — ONABOTULINUMTOXINA 200 [USP'U]/1
INJECTION, POWDER, LYOPHILIZED, FOR SOLUTION INTRADERMAL; INTRAMUSCULAR
Qty: 1 VIAL | Refills: 3 | Status: SHIPPED | OUTPATIENT
Start: 2020-07-14 | End: 2021-10-01

## 2020-07-15 ENCOUNTER — TELEPHONE (OUTPATIENT)
Dept: NEUROLOGY | Age: 45
End: 2020-07-15

## 2020-07-15 NOTE — TELEPHONE ENCOUNTER
Botox P. A. request    Called pharmacy help desk which is [facility] for [facility] [employer]. Set up case PA [phone].      Faxed clinicals to [phone]

## 2020-07-16 ENCOUNTER — TELEPHONE (OUTPATIENT)
Dept: NEUROLOGY | Age: 45
End: 2020-07-16

## 2020-07-16 NOTE — TELEPHONE ENCOUNTER
Botox denied by [facility]. Letter scanned in chart. Forwarding to Dr. Tylor Lara for next steps  C: Lorraine Slater.

## 2020-07-16 NOTE — TELEPHONE ENCOUNTER
Botox  approved on appeal by Dr. Kiera Aparicio. 7/16/20 - 10/16/20 200 units per procedure. CPT 34439 - called medical plan s/w Manuel Kerr for [employer] Ref [id number], [id number] is a billable and valid code. \"PA and Pre D is not recommended\" per Maki Grider. Today at 2:15 pm    He identified [facility] has [facility] and gave ph number as [phone]. Forward to [address] [place] [address] for setting up delivery. Appt is 7/30.

## 2020-07-17 ENCOUNTER — DOCUMENTATION ONLY (OUTPATIENT)
Dept: NEUROLOGY | Age: 45
End: 2020-07-17

## 2020-07-21 ENCOUNTER — OFFICE VISIT (OUTPATIENT)
Dept: NEUROLOGY | Age: 45
End: 2020-07-21

## 2020-07-21 VITALS — HEIGHT: 62 IN | WEIGHT: 229 LBS | BODY MASS INDEX: 42.14 KG/M2

## 2020-07-21 DIAGNOSIS — G43.711 INTRACTABLE CHRONIC MIGRAINE WITHOUT AURA AND WITH STATUS MIGRAINOSUS: Primary | ICD-10-CM

## 2020-07-21 DIAGNOSIS — E11.8 CONTROLLED TYPE 2 DIABETES MELLITUS WITH COMPLICATION, WITHOUT LONG-TERM CURRENT USE OF INSULIN (HCC): ICD-10-CM

## 2020-07-21 DIAGNOSIS — G93.2 PSEUDOTUMOR CEREBRI: ICD-10-CM

## 2020-07-21 DIAGNOSIS — E53.8 B12 DEFICIENCY: ICD-10-CM

## 2020-07-21 DIAGNOSIS — M79.7 FIBROMYALGIA: ICD-10-CM

## 2020-07-21 DIAGNOSIS — F51.01 PRIMARY INSOMNIA: ICD-10-CM

## 2020-07-21 RX ORDER — CYANOCOBALAMIN 1000 UG/ML
INJECTION, SOLUTION INTRAMUSCULAR; SUBCUTANEOUS
Qty: 8 VIAL | Refills: 3
Start: 2020-07-21 | End: 2021-10-01

## 2020-07-21 RX ORDER — TRAZODONE HYDROCHLORIDE 50 MG/1
50 TABLET ORAL 2 TIMES DAILY
Qty: 60 TAB | Refills: 3 | Status: SHIPPED | OUTPATIENT
Start: 2020-07-21 | End: 2021-07-02 | Stop reason: SDUPTHER

## 2020-07-21 RX ORDER — QUETIAPINE FUMARATE 50 MG/1
TABLET, FILM COATED ORAL
Qty: 90 TAB | Refills: 3 | Status: SHIPPED | OUTPATIENT
Start: 2020-07-21 | End: 2021-03-22

## 2020-07-21 RX ORDER — PREGABALIN 200 MG/1
200 CAPSULE ORAL 2 TIMES DAILY
Qty: 60 CAP | Refills: 3 | Status: SHIPPED | OUTPATIENT
Start: 2020-07-21 | End: 2021-07-02 | Stop reason: SDUPTHER

## 2020-07-21 RX ORDER — DULOXETIN HYDROCHLORIDE 60 MG/1
60 CAPSULE, DELAYED RELEASE ORAL DAILY
Qty: 30 CAP | Refills: 3 | Status: SHIPPED | OUTPATIENT
Start: 2020-07-21 | End: 2020-12-18
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
## 2020-07-23 ENCOUNTER — DOCUMENTATION ONLY (OUTPATIENT)
Dept: NEUROLOGY | Age: 45
End: 2020-07-23

## 2020-07-27 ENCOUNTER — TELEPHONE (OUTPATIENT)
Dept: NEUROLOGY | Age: 45
End: 2020-07-27

## 2020-07-28 ENCOUNTER — DOCUMENTATION ONLY (OUTPATIENT)
Dept: NEUROLOGY | Age: 45
End: 2020-07-28

## 2020-07-28 NOTE — PROGRESS NOTES
Spoke with patient and explained that because she was unable to figure out why it cost so much and we were unable to order I was going to cancel her appointmetn for July 30th. Told her that once we had it figured out to let me know and I would order it and fit her in somewhere. Patient stated understanding.

## 2020-07-30 ENCOUNTER — OFFICE VISIT (OUTPATIENT)
Dept: NEUROLOGY | Age: 45
End: 2020-07-30

## 2020-07-31 ENCOUNTER — TELEPHONE (OUTPATIENT)
Dept: NEUROLOGY | Age: 45
End: 2020-07-31

## 2020-07-31 NOTE — TELEPHONE ENCOUNTER
----- Message from Bita Willis sent at 7/31/2020  1:19 PM EDT -----  Regarding: Dr. Nav Mcgowan Message/Vendor Calls    Caller's first and last name:  [facility]    Reason for call:  Status of medications    Callback required yes/no and why:  yew    Best contact number(s):  [phone]    Details to clarify the request:  Pt has informed the pharmacy the office should be sending medication order fo \"B-12 injection\". No medication has been received.  Requesting to speak with the nurse in regards to this matter    Bita Willis

## 2020-09-23 ENCOUNTER — TELEPHONE (OUTPATIENT)
Dept: ENDOCRINOLOGY | Age: 45
End: 2020-09-23

## 2020-09-23 NOTE — TELEPHONE ENCOUNTER
Diana,    Can you reschedule this pt for an appt with Dr Joao Jordan around 10 am Tue-Friday, then forward the appt information back to me?

## 2020-10-04 ENCOUNTER — TELEPHONE (OUTPATIENT)
Dept: NEUROLOGY | Age: 45
End: 2020-10-04

## 2020-10-05 ENCOUNTER — TELEPHONE (OUTPATIENT)
Dept: NEUROLOGY | Age: 45
End: 2020-10-05

## 2020-10-05 NOTE — TELEPHONE ENCOUNTER
[facility] denied it - because they assumed she had the first procedure because of the approval on file - I will reiterate there is no improvement in headaches because she has not had the 1st one. please confirm.

## 2020-10-09 RX ORDER — METHOCARBAMOL 750 MG/1
TABLET, FILM COATED ORAL
Qty: 270 TAB | Refills: 1 | Status: SHIPPED | OUTPATIENT
Start: 2020-10-09 | End: 2021-07-02

## 2020-10-12 ENCOUNTER — TELEPHONE (OUTPATIENT)
Dept: NEUROLOGY | Age: 45
End: 2020-10-12

## 2020-10-16 ENCOUNTER — VIRTUAL VISIT (OUTPATIENT)
Dept: ENDOCRINOLOGY | Age: 45
End: 2020-10-16
Payer: COMMERCIAL

## 2020-10-16 DIAGNOSIS — E11.40 TYPE 2 DIABETES MELLITUS WITH DIABETIC NEUROPATHY, WITHOUT LONG-TERM CURRENT USE OF INSULIN (HCC): Primary | ICD-10-CM

## 2020-10-16 PROCEDURE — 99214 OFFICE O/P EST MOD 30 MIN: CPT | Performed by: INTERNAL MEDICINE

## 2020-10-16 RX ORDER — LANCETS
EACH MISCELLANEOUS
Qty: 1 PACKAGE | Refills: 11 | Status: SHIPPED | OUTPATIENT
Start: 2020-10-16 | End: 2020-10-16 | Stop reason: SDUPTHER

## 2020-10-16 RX ORDER — PEN NEEDLE, DIABETIC 31 GX3/16"
100 NEEDLE, DISPOSABLE MISCELLANEOUS DAILY
Qty: 100 PEN NEEDLE | Refills: 4 | Status: SHIPPED | OUTPATIENT
Start: 2020-10-16 | End: 2021-10-01

## 2020-10-16 RX ORDER — LANCETS
EACH MISCELLANEOUS
Qty: 1 PACKAGE | Refills: 11 | Status: SHIPPED | OUTPATIENT
Start: 2020-10-16 | End: 2020-11-03 | Stop reason: SDUPTHER

## 2020-10-16 NOTE — TELEPHONE ENCOUNTER
Please stated on the rx of how often the  patient is to use her lancets and test strips and the reason of the usage.

## 2020-10-16 NOTE — PROGRESS NOTES
Chief Complaint   Patient presents with    New Patient     doxy:[id number]    Diabetes    Other     pharmacy: [facility]     History of Present Illness: Chavez Hutchinson is a 53 y.o. female with a past medical hx significant for migraine headaches, BMI 41,  presenting in referral from Vilma Hubbard MD for discussion related to medication management of diabetes mellitus. Was diagnosed with DMII at age 34. Is tolerating metformin well. Current weight is 240 lbs, baseline was about 222. Gained 20 lbs with COVID. Just started new job  and is working 16 hour days- starts at [employer] and finishes at 0700. Has a history of migraine headaches and has nausea with them. Cannot drink barium for a swallow eval and has not had an upper endoscopy. Was begun on saxenda previously and did well with this. Is not planning for more children.      Diabetes Mellitus Type II    * Diagnosed (year): Age 34   * Last Hb A1C: \"haven't had one in a minute\"     - Current DM medications:    - Orals: metformin 1000mg BID   - Injectables: none currently, previously saxenda     - Monitors glucose: 1 time a day- 1800   Examples (range):    - fastin-130 mg/dL        - History of hypoglycemia: down to 89 in the past at work- feels like she can't think when BG is low    - Hospitalized for DM: never    Diabetes-related complications:    * Nephropathy: Not available   * Retinopathy: Did not discuss   * Neuropathy: Did not discuss   * Autonomic dysfunction: Did not discuss   * History of amputations or foot ulcers: none     Lifestyle:    24-hour diet history:    1 meals/day 0 snacks/day   * Breakfast: none   * Lunch: none   * Dinner: 8-9 1 meal daily- yesterday:  4 drummettes      * Snacks:  none   * Desserts: none   * Drinks: Water    2019QI  Exercise:  Did not discuss     Support and Resources:   - Visit with dietician in the last 2 years: did not discuss    Past Medical History:   Diagnosis Date    Asthma     Diabetes ([facility] [address]  Fibromyalgia     Hypertension     Lower back injury 02/13/2017    Migraines     Nausea and vomiting 1/14/2014    Other ill-defined conditions(799.89)     cerebral tumor, endometriosis, fibromyalgia, herpes    Right lower quadrant abdominal mass 1/14/2014    Seizures ([facility] [address]     Sleep apnea     intolerant to CPAP    Unspecified adverse effect of anesthesia     pt states that she has an asthma attack when coming out of  anesthesia       Past Surgical History:   Procedure Laterality Date    HX CHOLECYSTECTOMY      HX COLECTOMY  3/14/2013    Rt colectomy for perf transverse diverticulum    HX HEENT      wisdom teeth    HX OTHER SURGICAL      laproscopy on ovaries    HX OTHER SURGICAL      left wrist ganglion cystectomy    HX OTHER SURGICAL      lung mass removed       Current Outpatient Medications   Medication Sig    methocarbamoL (ROBAXIN) 750 mg tablet TAKE 1 TABLET BY MOUTH THREE TIMES DAILY    QUEtiapine (SEROquel) 50 mg tablet Take one by mouth every night    traZODone (DESYREL) 50 mg tablet Take 1 Tab by mouth two (2) times a day.  Syringe-Needle, Safety,Disp Un 3 mL 25 gauge x 1\" syrg Use as directed    cyanocobalamin (Vitamin B-12) 1,000 mcg/mL injection Intramuscular injection of 1000 mcg every week for 4 weeks then every month  Indications: inadequate vitamin B12    pregabalin (LYRICA) 200 mg capsule Take 1 Cap by mouth two (2) times a day. Max Daily Amount: 400 mg.    DULoxetine (CYMBALTA) 60 mg capsule Take 1 Cap by mouth daily.  onabotulinumtoxinA (Botox) 200 unit injection 200 units by IM route once every 12 weeks. Inject IM neck/face, 31 FDA approved sites. Indication: Migraine prevention. DX code: G43.71    metFORMIN (GLUCOPHAGE) 1,000 mg tablet TAKE 1 TABLET BY MOUTH TWICE A DAY(WITH MORNING AND EVENING MEAL)    omeprazole (PRILOSEC) 20 mg capsule Take 20 mg by mouth daily.  busPIRone (BUSPAR) 5 mg tablet Take  by mouth.     topiramate ER (Trokendi XR) 50 mg capsule Take 2 Caps by mouth daily.  cyclobenzaprine (FLEXERIL) 10 mg tablet Take 1 Tab by mouth three (3) times daily as needed for Muscle Spasm(s).  acetaZOLAMIDE SR (DIAMOX) 500 mg capsule Take 1 Cap by mouth two (2) times a day.  lamoTRIgine (LAMICTAL) 50 mg disintegrating tablet Take 1 Tab by mouth two (2) times a day.  ondansetron (ZOFRAN ODT) 4 mg disintegrating tablet Take 1 Tab by mouth every eight (8) hours as needed for Nausea.  lancets (ONETOUCH ULTRASOFT LANCETS) misc Use as directed    atenolol (TENORMIN) 100 mg tablet Take 1 Tab by mouth daily.  zonisamide (ZONEGRAN) 100 mg capsule take 2 capsules by mouth twice a day    fluticasone-salmeterol (ADVAIR DISKUS) 250-50 mcg/dose diskus inhaler Take 1 Puff by inhalation every twelve (12) hours.  acyclovir (ZOVIRAX) 200 mg capsule Take 200 mg by mouth two (2) times a day.  diphenhydrAMINE (BENADRYL) 25 mg capsule Take 25 mg by mouth as needed. Indications: [facility]igc to [facility] - only takes with evette     No current facility-administered medications for this visit.         Allergies   Allergen Reactions    Lemon Anaphylaxis    Adhesive Tape-Silicones Other (comments)     Pt reports it burns her skin    Dilaudid [Hydromorphone (Pf)] Itching    Fentanyl Shortness of Breath    Percocet [Oxycodone-Acetaminophen] Unknown (comments)     Pt states not allergic       Family History   Problem Relation Age of Onset    Other Other         unable to obtain due to AMS    Hypertension Father     Heart Disease Father     Cancer Father     Elevated Lipids Father     Hypertension Mother     Diabetes Maternal Grandmother     Diabetes Maternal Grandfather     Lupus Sister     Hypertension Sister     Elevated Lipids Sister          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- takes a shower, gets ready  Seldom alcohol     Review of Systems:  - Constitutional Symptoms: weight gain as per HPI  - Eyes: no blurry vision or double vision  - Cardiovascular: no chest pain or palpitations  - Respiratory: no cough or shortness of breath  - Gastrointestinal: no dysphagia or abdominal pain, nausea with headaches  - Musculoskeletal: no joint pains or weakness  - Integumentary: no rashes  - Neurological: significant migraine headaches  - Psychiatric: no depression or anxiety  - Endocrine: no heat or cold intolerance, no polyuria or polydipsia    Physical Examination:  (challenging as pt was in a very dark room while we were on the video chat - had to switch to a phone call)    Data Reviewed:   -lipids done 3 weeks ago  -      Assessment/Plan: This is a 54 yo female with a past medical hx significant for BMI 41, migraine headaches, sleep apnea intolerant of CPAP, and HTN presenting in referral from Kourtney Huddleston MD for discussion related to DMII. Herman Linn reports that she has not had an HbA1c for some time. I did not have time to review microvascular complications with Herman Linn today and did not receive her labs; will obtain those at this time. With respect to medication options for Harjinder diabetes, I discussed with her the fact that both GLP1 agonists and SGLT2 inhibitors are associated with weight loss and cardiovascular mortality benefits. She previously did well with saxenda (liraglutide), despite her daily nausea. Will re-trial liraglutide for both it's weight loss and cardiovascular mortality benefits. Explained the low but real risk of pancreatitis with this medication.     #DMII, no HbA1c available at this time  -goal HbA1c is 7.0% or less given age  -continue metformin 1000mg BID  -initiate liraglutide 0.6mg-->1.2mg-->1.8mg, cautioned pt against nausea with this- she previously has tolerated saxenda which is 3.0mg daily  -obtain outside records including lipid panel, HbA1c, urinary microalbumin level  -pt likely would benefit from a discussion with a diabetes educator, will discuss this at the next visit  -sounds as though she was recently begun on a statin      Copy sent to: Danna Burrell MD        Return to care:November 16th at 12:10      Patricia Correia is a 53 y.o. female being evaluated by a Virtual Visit (video visit) encounter to address concerns as mentioned above. A caregiver was present when appropriate. Due to this being a TeleHealth encounter (During SZNGW-02 public health emergency), evaluation of the following organ systems was limited:     Vitals/Constitutional/EENT/Resp/CV/GI//MS/Neuro/Skin/Heme-Lymph-Imm. Pursuant to the emergency declaration under the [address] [place] [address], [address] [place] [address] authority and the [employer] and [employer] Act, this Virtual Visit was conducted with patient's (and/or legal guardian's) consent, to reduce the risk of exposure to COVID-19 and provide necessary medical care. Services were provided through a video synchronous discussion virtually to substitute for in-person encounter. Rodo Moreira MD  [facility]     An electronic signature was used to authenticate this note. We spent 25 minutes together and > 50% of the time was spent in counseling regarding management of all the conditions above.

## 2020-10-16 NOTE — Clinical Note
11/16 12:10  Diana, could you try to obtain recent lipid panel, urinary microalbumin if present, and HbA1c? TY!

## 2020-11-03 RX ORDER — LANCETS
EACH MISCELLANEOUS
Qty: 1 PACKAGE | Refills: 11 | Status: SHIPPED | OUTPATIENT
Start: 2020-11-03 | End: 2021-10-01

## 2020-11-03 NOTE — TELEPHONE ENCOUNTER
Pharmacy is requesting direction for the use of the test strips as well as how often they're checking bs

## 2020-11-04 ENCOUNTER — TELEPHONE (OUTPATIENT)
Dept: NEUROLOGY | Age: 45
End: 2020-11-04

## 2020-11-04 NOTE — TELEPHONE ENCOUNTER
The previous [employer] PA  and I received a request to renew it from the insurance plan. Last seen as a follow up in . She did not utilize the Botox authorization previously obtained. fyi to Dr. Lorenzo Butterfield.

## 2020-11-16 ENCOUNTER — VIRTUAL VISIT (OUTPATIENT)
Dept: ENDOCRINOLOGY | Age: 45
End: 2020-11-16
Payer: COMMERCIAL

## 2020-11-16 DIAGNOSIS — E11.40 TYPE 2 DIABETES MELLITUS WITH DIABETIC NEUROPATHY, WITHOUT LONG-TERM CURRENT USE OF INSULIN (HCC): Primary | ICD-10-CM

## 2020-11-16 PROCEDURE — 99214 OFFICE O/P EST MOD 30 MIN: CPT | Performed by: INTERNAL MEDICINE

## 2020-11-16 RX ORDER — ONDANSETRON 4 MG/1
4 TABLET, FILM COATED ORAL
Qty: 30 TAB | Refills: 0 | Status: SHIPPED | OUTPATIENT
Start: 2020-11-16 | End: 2020-12-17

## 2020-11-16 RX ORDER — LANCETS
EACH MISCELLANEOUS
Qty: 1 PACKAGE | Refills: 11 | Status: CANCELLED | OUTPATIENT
Start: 2020-11-16

## 2020-11-16 RX ORDER — LANCETS
EACH MISCELLANEOUS
Qty: 1 EACH | Refills: 11 | Status: SHIPPED | OUTPATIENT
Start: 2020-11-16 | End: 2021-10-01

## 2020-11-16 NOTE — PROGRESS NOTES
Chief Complaint   Patient presents with    Follow Up Appointment     Doxy: [phone]    Diabetes     History of Present Illness: Latrice Barger is a 50 y.o. female with a past medical hx significant for migraine headaches, BMI 41,  presenting in follow-up for continued discussion related to medication management of diabetes mellitus. Previously:  Was diagnosed with DMII at age 34. Is tolerating metformin well. Current weight is 240 lbs, baseline was about 222. Gained 20 lbs with COVID. Just started new job  and is working 16 hour days- starts at [employer] and finishes at 0700. Has a history of migraine headaches and has nausea with them. Cannot drink barium for a swallow eval and has not had an upper endoscopy. Was begun on saxenda previously and did well with this. Is not planning for more children. Today:  Reports that her BG was low today - 88. Last couple of weeks has been going low minimally to the 80s. Has lost 10 lbs, does have nausea that is a little worse. Has been on victoza for a month. Is going to have an implant in the spine in  and orthopedic surgeon would like her to lose 20 pounds prior to this. Insurance previously paid for Saxenda. Is wondering if she can have a prescription for nausea.     Diabetes Mellitus Type II    * Diagnosed (year): Age 34   * Last Hb A1C:  5.8 2020     - Current DM medications:    - Orals: metformin 1000mg BID   - Injectables: Liraglutide 1.8 mg daily    - Monitors glucose: 1 time a day- 1800   Examples (range):    - fastin-130 mg/dL        - History of hypoglycemia: down to 89 in the past at work- feels like she can't think when BG is low    - Hospitalized for DM: never    Diabetes-related complications:    * Nephropathy:  Will obtain   * Retinopathy: Did not discuss   * Neuropathy: Did not discuss   * Autonomic dysfunction: Did not discuss   * History of amputations or foot ulcers: none     Lifestyle:  Previously   24-hour diet history: 1 meals/day 0 snacks/day   * Breakfast: none   * Lunch: none   * Dinner: 8-9 1 meal daily- yesterday:  4 drummettes      * Snacks:  none   * Desserts: none   * Drinks: Water    2019QI  Exercise:  Did not discuss     Support and Resources:   - Visit with dietician in the last 2 years: did not discuss    Current Outpatient Medications   Medication Sig    glucose blood VI test strips (ASCENSIA AUTODISC VI, ONE TOUCH ULTRA TEST VI) strip Check blood glucose level once daily for diabetes E 11.9    lancets (OneTouch UltraSoft Lancets) misc Check blood glucose once daily for diabetes, E 11.9    liraglutide (VICTOZA) 0.6 mg/0.1 mL (18 mg/3 mL) pnij 0.6 mg daily for 7 days, THEN 1.2 mg daily for 7 days, THEN 1.8 mg daily for 200 days. (Patient taking differently: 1.8 mg daily for 200 days.)    Insulin Needles, Disposable, 32 gauge x 5/32\" ndle 100 Each by Does Not Apply route daily.  methocarbamoL (ROBAXIN) 750 mg tablet TAKE 1 TABLET BY MOUTH THREE TIMES DAILY    QUEtiapine (SEROquel) 50 mg tablet Take one by mouth every night    traZODone (DESYREL) 50 mg tablet Take 1 Tab by mouth two (2) times a day.  Syringe-Needle, Safety,Disp Un 3 mL 25 gauge x 1\" syrg Use as directed    cyanocobalamin (Vitamin B-12) 1,000 mcg/mL injection Intramuscular injection of 1000 mcg every week for 4 weeks then every month  Indications: inadequate vitamin B12    pregabalin (LYRICA) 200 mg capsule Take 1 Cap by mouth two (2) times a day. Max Daily Amount: 400 mg.    DULoxetine (CYMBALTA) 60 mg capsule Take 1 Cap by mouth daily.  onabotulinumtoxinA (Botox) 200 unit injection 200 units by IM route once every 12 weeks. Inject IM neck/face, 31 FDA approved sites. Indication: Migraine prevention. DX code: G43.71    metFORMIN (GLUCOPHAGE) 1,000 mg tablet TAKE 1 TABLET BY MOUTH TWICE A DAY(WITH MORNING AND EVENING MEAL)    omeprazole (PRILOSEC) 20 mg capsule Take 20 mg by mouth daily.     busPIRone (BUSPAR) 5 mg tablet Take  by mouth.    topiramate ER (Trokendi XR) 50 mg capsule Take 2 Caps by mouth daily.  cyclobenzaprine (FLEXERIL) 10 mg tablet Take 1 Tab by mouth three (3) times daily as needed for Muscle Spasm(s).  acetaZOLAMIDE SR (DIAMOX) 500 mg capsule Take 1 Cap by mouth two (2) times a day.  lamoTRIgine (LAMICTAL) 50 mg disintegrating tablet Take 1 Tab by mouth two (2) times a day.  ondansetron (ZOFRAN ODT) 4 mg disintegrating tablet Take 1 Tab by mouth every eight (8) hours as needed for Nausea.  atenolol (TENORMIN) 100 mg tablet Take 1 Tab by mouth daily.  zonisamide (ZONEGRAN) 100 mg capsule take 2 capsules by mouth twice a day    fluticasone-salmeterol (ADVAIR DISKUS) 250-50 mcg/dose diskus inhaler Take 1 Puff by inhalation every twelve (12) hours.  acyclovir (ZOVIRAX) 200 mg capsule Take 200 mg by mouth two (2) times a day.  diphenhydrAMINE (BENADRYL) 25 mg capsule Take 25 mg by mouth as needed. Indications: [facility]igc to [facility] - only takes with evette     No current facility-administered medications for this visit. Review of Systems:  - Constitutional Symptoms: As per HPI is lost 10 pounds   - eyes: no blurry vision or double vision  - Gastrointestinal: Endorses nausea   - musculoskeletal: no joint pains or weakness  - Neurological: significant migraine headaches  - Endocrine: no heat or cold intolerance, no polyuria or polydipsia    Physical Examination:  Pleasant female wearing sunglasses and a mask at the time of the visit  Breathing comfortably on room air      Assessment/Plan: This is a 52 yo female with a past medical hx significant for BMI 41, migraine headaches, sleep apnea intolerant of CPAP, and HTN presenting in follow-up for continued discussion regarding type 2 diabetes and obesity. Since her last visit, we started Milagros Ramos on liraglutide on have uptitrated to 1.8 mg daily. She is tolerating this well with a bit of nausea.   Previously explained the low but real risk of pancreatitis with this medication. It appears that [employer] will pay for Saxenda so we will uptitrate to 3.0 mg daily. Also explained the fact that the body becomes accustomed to blood glucose levels in the 80s once they have consistently been at that level. Will prescribe Zofran for nausea but did discourage her from using this daily. Will reassess A1c 3 months following initiation of liraglutide. #DMII, no HbA1c available at this time  -goal HbA1c is 7.0% or less given age  -continue metformin 1000mg BID  -Continue to uptitrate liraglutide from 1.8 mg to 2.4 up to 3.0, cautioned pt against nausea with this- she previously has tolerated saxenda which is 3.0mg daily  -Repeat hemoglobin A1c in 3 months following initiation of liraglutide  -PCP did not perform urinary microalbumin creatinine ratio, obtain with next set of labs  -pt likely would benefit from a discussion with a diabetes educator, will discuss this at the next visit  -sounds as though she was recently begun on a statin      Copy sent to: Madelyn Mooney MD      Return to care:12/29 at 12:10       Sulma Ni is a 50 y.o. female being evaluated by a Virtual Visit (video visit) encounter to address concerns as mentioned above. A caregiver was present when appropriate. Due to this being a TeleHealth encounter (During PUVVU-39 public health emergency), evaluation of the following organ systems was limited:     Vitals/Constitutional/EENT/Resp/CV/GI//MS/Neuro/Skin/Heme-Lymph-Imm. Pursuant to the emergency declaration under the [address] [place] [address], [address] [place] [address] authority and the [employer] Act, this Virtual Visit was conducted with patient's (and/or legal guardian's) consent, to reduce the risk of exposure to COVID-19 and provide necessary medical care.       Services were provided through a video synchronous discussion virtually to substitute for in-person encounter. Hansa Lizama MD  [facility]     An electronic signature was used to authenticate this note.

## 2020-11-21 ENCOUNTER — TELEPHONE (OUTPATIENT)
Dept: NEUROLOGY | Age: 45
End: 2020-11-21

## 2020-11-21 NOTE — TELEPHONE ENCOUNTER
A[employer] PA submitted via CMM to [facility]    Per CMM her BIN is [id number] and PCN is [id number]    Sent w/ last office note 7/21/20.  (Key: [id number])  Aimovig 140MG/ML auto-injectors

## 2020-12-16 DIAGNOSIS — M79.7 FIBROMYALGIA: ICD-10-CM

## 2020-12-17 DIAGNOSIS — F51.01 PRIMARY INSOMNIA: ICD-10-CM

## 2020-12-17 DIAGNOSIS — M79.7 FIBROMYALGIA: ICD-10-CM

## 2020-12-17 RX ORDER — ONDANSETRON 4 MG/1
TABLET, FILM COATED ORAL
Qty: 30 TAB | Refills: 0 | Status: SHIPPED | OUTPATIENT
Start: 2020-12-17 | End: 2021-07-02 | Stop reason: SDUPTHER

## 2020-12-18 RX ORDER — DULOXETIN HYDROCHLORIDE 60 MG/1
CAPSULE, DELAYED RELEASE ORAL
Qty: 30 CAP | Refills: 3 | Status: SHIPPED | OUTPATIENT
Start: 2020-12-18 | End: 2021-07-02 | Stop reason: SDUPTHER

## 2020-12-18 RX ORDER — TRAZODONE HYDROCHLORIDE 100 MG/1
TABLET ORAL
Qty: 30 TAB | Refills: 5 | Status: SHIPPED | OUTPATIENT
Start: 2020-12-18 | End: 2021-07-02 | Stop reason: SDUPTHER

## 2020-12-18 RX ORDER — PREGABALIN 150 MG/1
CAPSULE ORAL
Qty: 180 CAP | Refills: 3 | Status: SHIPPED | OUTPATIENT
Start: 2020-12-18 | End: 2021-04-02

## 2020-12-23 ENCOUNTER — TELEPHONE (OUTPATIENT)
Dept: NEUROLOGY | Age: 45
End: 2020-12-23

## 2020-12-23 ENCOUNTER — TRANSCRIBE ORDER (OUTPATIENT)
Dept: NEUROLOGY | Age: 45
End: 2020-12-23

## 2020-12-23 NOTE — TELEPHONE ENCOUNTER
I called the pharmacy and found several different strengths of her medications.   I gave the ones that were sent in on 12/18/2020 as clarified and asked them to have the patient call and make a follow up appointment

## 2020-12-29 ENCOUNTER — VIRTUAL VISIT (OUTPATIENT)
Dept: ENDOCRINOLOGY | Age: 45
End: 2020-12-29
Payer: COMMERCIAL

## 2020-12-29 DIAGNOSIS — G47.33 OSA (OBSTRUCTIVE SLEEP APNEA): Primary | ICD-10-CM

## 2020-12-29 PROCEDURE — 99213 OFFICE O/P EST LOW 20 MIN: CPT | Performed by: INTERNAL MEDICINE

## 2020-12-29 NOTE — PROGRESS NOTES
Chief Complaint   Patient presents with    Diabetes     doxy: [id number]    Medication Evaluation     History of Present Illness: Abel Han is a 50 y.o. female with a past medical hx significant for migraine headaches, BMI 41, fibromyalgia presenting in follow-up for continued discussion related to medication management of diabetes mellitus and obesity. 10/16/2020: Was diagnosed with DMII at age 34. Is tolerating metformin well. Current weight is 240 lbs, baseline was about 222. Gained 20 lbs with COVID. Just started new job  and is working 16 hour days- starts at [employer] and finishes at 0700. Has a history of migraine headaches and has nausea with them. Cannot drink barium for a swallow eval and has not had an upper endoscopy. Was begun on saxenda previously and did well with this. Is not planning for more children. 2020:  Reports that her BG was low today - 88. Last couple of weeks has been going low minimally to the 80s. Has lost 10 lbs, does have nausea that is a little worse. Has been on victoza for a month. Is going to have an implant in the spine in  and orthopedic surgeon would like her to lose 20 pounds prior to this. Insurance previously paid for Saxenda. Is wondering if she can have a prescription for nausea. 2020: Is at work right now. Is having a lot of back pain and headaches. Insurance needed a prior [place] for saxenda. Currently is at 230- wants to be 200 lbs. Is taking seroquel, not sleeping for more than 2 hours a night. Does have untreated LULU. Leida, neurologist is prescribing quetiapine, trazodone. Also takes pregabalin for fibromyalgia.      Diabetes Mellitus Type II    * Diagnosed (year): Age 34   * Last Hb A1C:  5.8 2020     - Current DM medications:    - Orals: metformin 1000mg BID   - Injectables: Liraglutide 1.8 mg daily    - Monitors glucose: 1 time a day- 1800   Examples (range):    - fastin-130 mg/dL        - History of hypoglycemia: down to 89 in the past at work- feels like she can't think when BG is low    - Hospitalized for DM: never    Diabetes-related complications:    * Nephropathy:  Will obtain   * Retinopathy: Did not discuss   * Neuropathy: Did not discuss   * Autonomic dysfunction: Did not discuss   * History of amputations or foot ulcers: none     Lifestyle:  Previously   24-hour diet history:    1 meals/day 0 snacks/day   * Breakfast: none   * Lunch: none   * Dinner: 8-9 1 meal daily- yesterday:  4 drummettes      * Snacks:  none   * Desserts: none   * Drinks: Water    2019QI  Exercise:  Did not discuss     Support and Resources:   - Visit with dietician in the last 2 years: did not discuss    Current Outpatient Medications   Medication Sig    traZODone (DESYREL) 100 mg tablet TAKE 1 TABLET BY MOUTH EVERY NIGHT    DULoxetine (CYMBALTA) 60 mg capsule TAKE 1 CAPSULE BY MOUTH DAILY    ondansetron hcl (ZOFRAN) 4 mg tablet TAKE 1 TABLET BY MOUTH DAILY AS NEEDED FOR NAUSEA OR VOMITING    glucose blood VI test strips (ASCENSIA AUTODISC VI, ONE TOUCH ULTRA TEST VI) strip Check blood glucose level once daily for diabetes E 11.9    lancets (OneTouch UltraSoft Lancets) misc Check blood glucose once daily for diabetes, E 11.9    Insulin Needles, Disposable, 32 gauge x 5/32\" ndle 100 Each by Does Not Apply route daily.  methocarbamoL (ROBAXIN) 750 mg tablet TAKE 1 TABLET BY MOUTH THREE TIMES DAILY    QUEtiapine (SEROquel) 50 mg tablet Take one by mouth every night    Syringe-Needle, Safety,Disp Un 3 mL 25 gauge x 1\" syrg Use as directed    cyanocobalamin (Vitamin B-12) 1,000 mcg/mL injection Intramuscular injection of 1000 mcg every week for 4 weeks then every month  Indications: inadequate vitamin B12    pregabalin (LYRICA) 200 mg capsule Take 1 Cap by mouth two (2) times a day.  Max Daily Amount: 400 mg.    metFORMIN (GLUCOPHAGE) 1,000 mg tablet TAKE 1 TABLET BY MOUTH TWICE A DAY(WITH MORNING AND EVENING MEAL)    omeprazole (PRILOSEC) 20 mg capsule Take 20 mg by mouth daily.  busPIRone (BUSPAR) 5 mg tablet Take  by mouth.  topiramate ER (Trokendi XR) 50 mg capsule Take 2 Caps by mouth daily.  cyclobenzaprine (FLEXERIL) 10 mg tablet Take 1 Tab by mouth three (3) times daily as needed for Muscle Spasm(s).  atenolol (TENORMIN) 100 mg tablet Take 1 Tab by mouth daily.  zonisamide (ZONEGRAN) 100 mg capsule take 2 capsules by mouth twice a day    fluticasone-salmeterol (ADVAIR DISKUS) 250-50 mcg/dose diskus inhaler Take 1 Puff by inhalation every twelve (12) hours.  acyclovir (ZOVIRAX) 200 mg capsule Take 200 mg by mouth two (2) times a day.  diphenhydrAMINE (BENADRYL) 25 mg capsule Take 25 mg by mouth as needed. Indications: [facility] - only takes with evette    pregabalin (LYRICA) 150 mg capsule TAKE 1 CAPSULE BY MOUTH TWICE DAILY. MAX DAILY AMOUNT: 300 MG    lancets misc Check blood glucose once daily. Diagnosis code E 11.9    liraglutide, weight loss, (SAXENDA) 3 mg/0.5 mL (18 mg/3 mL) pen 0.4 mL daily for 7 days, THEN 0.5 mL daily for 200 days.  traZODone (DESYREL) 50 mg tablet Take 1 Tab by mouth two (2) times a day.  onabotulinumtoxinA (Botox) 200 unit injection 200 units by IM route once every 12 weeks. Inject IM neck/face, 31 FDA approved sites. Indication: Migraine prevention. DX code: G43.71    acetaZOLAMIDE SR (DIAMOX) 500 mg capsule Take 1 Cap by mouth two (2) times a day. No current facility-administered medications for this visit. Review of Systems:  - Constitutional Symptoms: As per HPI is lost 10 pounds, feels like she is hitting a plateau, is only sleeping about 2 hours a night  - eyes: no blurry vision or double vision  - Gastrointestinal: Endorses nausea   - musculoskeletal: no joint pains or weakness  - Neurological: significant migraine headaches  - Endocrine: no heat or cold intolerance, no polyuria or polydipsia    Physical Examination:   This was a telephone call      Assessment/Plan: This is a 52 yo female with a past medical hx significant for BMI 41, migraine headaches, sleep apnea previously intolerant of CPAP, fibromyalgia and HTN presenting in follow-up for continued discussion regarding type 2 diabetes and obesity. Ninoska Husbands is tolerating liraglutide well with a bit of nausea. Previously explained the low but real risk of pancreatitis with this medication. Unfortunately, her weight loss has plateaued. Review of Catie's medication list reveals several medications that are weight positive. Specifically, trazodone, olanzapine, pregabalin. More recently, Ninoska Husbands has not slept for more than 2 hours at night. I will place another referral to sleep medicine for discussion regarding untreated sleep apnea and sleep latency disorder. #DMII, no HbA1c available at this time  -goal HbA1c is 7.0% or less given age  -continue metformin 1000mg BID  -Continue liraglutide 1.8 mg daily, attempt to obtain Saxenda  -Referral placed to sleep medicine for treatment of uncontrolled obstructive sleep apnea, recommend discontinuing trazodone, olanzapine, pregabalin if possible as these are weight positive medications  -Repeat hemoglobin A1c in 3 months following initiation of liraglutide  -PCP did not perform urinary microalbumin creatinine ratio, obtain with next set of labs  -pt likely would benefit from a discussion with a diabetes educator, will discuss this at the next visit  -sounds as though she was recently begun on a statin      Copy sent to: Jarvis Blanton MD    Return to care: In 6 months      Olaf Amezquita is a 50 y.o. female being evaluated by a Virtual Visit (video visit) encounter to address concerns as mentioned above. A caregiver was present when appropriate.  Due to this being a TeleHealth encounter (During YEALX-60 public health emergency), evaluation of the following organ systems was limited: Vitals/Constitutional/EENT/Resp/CV/GI//MS/Neuro/Skin/Heme-Lymph-Imm. Pursuant to the emergency declaration under the [address] [place] [address] authority and the [employer] Act, this Virtual Visit was conducted with patient's (and/or legal guardian's) consent, to reduce the risk of exposure to COVID-19 and provide necessary medical care. Services were provided through a video synchronous discussion virtually to substitute for in-person encounter. Marcelle Ramos MD  [address] [place] [address] Diabetes & Endocrinology     An electronic signature was used to authenticate this note.

## 2021-01-12 DIAGNOSIS — G43.711 INTRACTABLE CHRONIC MIGRAINE WITHOUT AURA AND WITH STATUS MIGRAINOSUS: ICD-10-CM

## 2021-01-14 RX ORDER — ACETAZOLAMIDE 500 MG/1
CAPSULE, EXTENDED RELEASE ORAL
Qty: 60 CAP | Refills: 5 | Status: SHIPPED | OUTPATIENT
Start: 2021-01-14 | End: 2021-07-02 | Stop reason: SDUPTHER

## 2021-03-21 DIAGNOSIS — F51.01 PRIMARY INSOMNIA: ICD-10-CM

## 2021-03-22 RX ORDER — QUETIAPINE FUMARATE 50 MG/1
TABLET, FILM COATED ORAL
Qty: 90 TAB | Refills: 3 | Status: SHIPPED | OUTPATIENT
Start: 2021-03-22 | End: 2021-07-02 | Stop reason: SDUPTHER

## 2021-03-31 ENCOUNTER — TELEPHONE (OUTPATIENT)
Dept: NEUROLOGY | Age: 46
End: 2021-03-31

## 2021-04-01 DIAGNOSIS — G43.711 INTRACTABLE CHRONIC MIGRAINE WITHOUT AURA AND WITH STATUS MIGRAINOSUS: ICD-10-CM

## 2021-04-01 RX ORDER — ERENUMAB-AOOE 140 MG/ML
INJECTION, SOLUTION SUBCUTANEOUS
Qty: 1 ML | Refills: 11 | Status: SHIPPED | OUTPATIENT
Start: 2021-04-01 | End: 2021-05-26 | Stop reason: SDUPTHER

## 2021-04-02 ENCOUNTER — OFFICE VISIT (OUTPATIENT)
Dept: NEUROLOGY | Age: 46
End: 2021-04-02
Payer: COMMERCIAL

## 2021-04-02 VITALS
HEIGHT: 62 IN | DIASTOLIC BLOOD PRESSURE: 72 MMHG | OXYGEN SATURATION: 100 % | BODY MASS INDEX: 44.72 KG/M2 | TEMPERATURE: 97.8 F | HEART RATE: 98 BPM | SYSTOLIC BLOOD PRESSURE: 110 MMHG | WEIGHT: 243 LBS

## 2021-04-02 DIAGNOSIS — M79.7 FIBROMYALGIA: ICD-10-CM

## 2021-04-02 DIAGNOSIS — G43.101 MIGRAINE WITH AURA AND WITH STATUS MIGRAINOSUS, NOT INTRACTABLE: ICD-10-CM

## 2021-04-02 DIAGNOSIS — G43.719 INTRACTABLE CHRONIC MIGRAINE WITHOUT AURA AND WITHOUT STATUS MIGRAINOSUS: Primary | ICD-10-CM

## 2021-04-02 DIAGNOSIS — R40.4 TRANSIENT ALTERATION OF AWARENESS: ICD-10-CM

## 2021-04-02 PROCEDURE — 99215 OFFICE O/P EST HI 40 MIN: CPT | Performed by: PSYCHIATRY & NEUROLOGY

## 2021-04-02 RX ORDER — DULOXETIN HYDROCHLORIDE 30 MG/1
30 CAPSULE, DELAYED RELEASE ORAL DAILY
Qty: 90 CAP | Refills: 1 | Status: SHIPPED | OUTPATIENT
Start: 2021-04-02 | End: 2021-07-02 | Stop reason: SDUPTHER

## 2021-04-02 NOTE — PATIENT INSTRUCTIONS
As per discussion We are going to increase your duloxetine also known as Cymbalta by 30 mg for total of 90 mg/day you will have 2 prescriptions one is for your 60 mg which you already have and I sent a 30 mg prescription to your pharmacy. EEG and MRI of the brain have been ordered Central scheduling should be calling you to set up the test that have been ordered. If you do not hear from them you can reach out to them at [phone] to get that completed. We will look to get you set up for Botox I do want you to follow-up with Dr. Brennon salcido is your normal provider for the results of your test and other treatment options going forward Office Policies 
 
o Phone calls/patient messages: Please allow up to 24 hours for someone in the office to contact you about your call or message. Be mindful your provider may be out of the office or your message may require further review. We encourage you to use [employer] for your messages as this is a faster, more efficient way to communicate with our office 
 
o Medication Refills: 
Prescription medications require up to 48 business hours to process. We encourage you to use [employer] for your refills. For controlled medications: Please allow up to 72 business hours to process. Certain medications may require you to  a written prescription at our office. NO narcotic/controlled medications will be prescribed after 4pm Monday through Friday or on weekends 
 
o Form/Paperwork Completion: We ask that you allow 7-14 business days. You may also download your forms to [employer] to have your doctor print off. Okay
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
## 2021-04-09 ENCOUNTER — TRANSCRIBE ORDER (OUTPATIENT)
Dept: SCHEDULING | Age: 46
End: 2021-04-09

## 2021-04-09 DIAGNOSIS — R31.9 HEMATURIA: Primary | ICD-10-CM

## 2021-04-13 ENCOUNTER — HOSPITAL ENCOUNTER (OUTPATIENT)
Dept: ULTRASOUND IMAGING | Age: 46
Discharge: HOME | End: 2021-04-13
Attending: NURSE PRACTITIONER
Payer: COMMERCIAL

## 2021-04-13 DIAGNOSIS — R31.9 HEMATURIA: ICD-10-CM

## 2021-04-13 PROCEDURE — 76770 US EXAM ABDO BACK WALL COMP: CPT

## 2021-04-27 ENCOUNTER — HOSPITAL ENCOUNTER (OUTPATIENT)
Dept: NEUROLOGY | Age: 46
Discharge: HOME | End: 2021-04-27
Payer: COMMERCIAL

## 2021-04-27 ENCOUNTER — HOSPITAL ENCOUNTER (OUTPATIENT)
Dept: MRI IMAGING | Age: 46
Discharge: HOME | End: 2021-04-27
Payer: COMMERCIAL

## 2021-04-27 DIAGNOSIS — R40.4 TRANSIENT ALTERATION OF AWARENESS: ICD-10-CM

## 2021-04-27 DIAGNOSIS — G43.101 MIGRAINE WITH AURA AND WITH STATUS MIGRAINOSUS, NOT INTRACTABLE: ICD-10-CM

## 2021-04-27 PROCEDURE — 74011250636 HC RX REV CODE- 250/636

## 2021-04-27 PROCEDURE — 70553 MRI BRAIN STEM W/O & W/DYE: CPT

## 2021-04-27 PROCEDURE — 95819 EEG AWAKE AND ASLEEP: CPT | Performed by: PSYCHIATRY & NEUROLOGY

## 2021-04-27 PROCEDURE — 95816 EEG AWAKE AND DROWSY: CPT

## 2021-04-27 PROCEDURE — A9575 INJ GADOTERATE MEGLUMI 0.1ML: HCPCS

## 2021-04-27 RX ORDER — GADOTERATE MEGLUMINE 376.9 MG/ML
20 INJECTION INTRAVENOUS
Status: COMPLETED | OUTPATIENT
Start: 2021-04-27 | End: 2021-04-27

## 2021-04-27 RX ADMIN — GADOTERATE MEGLUMINE 20 ML: 376.9 INJECTION INTRAVENOUS at 09:17

## 2021-04-30 NOTE — PROCEDURES
Patient Name: Henry Mclean  : 1975  Age: 50 y.o. Ordering physician: Tirso Lucas  Date of EE2021  EEG procedure number: [id number]  Diagnosis: Altered mental status   Interpreting physician: Joyce Nelson MD       ELECTROENCEPHALOGRAM REPORT     PROCEDURE: EEG. CLINICAL INDICATION: The patient is a 50 y.o. female who is being evaluated for baseline electro cerebral activities and to rule out seizure focus. EEG CLASSIFICATION: Normal    DESCRIPTION OF THE RECORD:   The background of this recording contains a posteriorly-located occipital alpha rhythm of 8-9 Hz that attenuates with eye opening. Throughout the recording, there were no clear areas of focal slowing nor spike or spike-and-wave discharges seen. Hyperventilation was not performed. Photic stimulation produced no response in the posterior head regions. During the recording, the patient did enter prolonged states of sleep with K-complexes and symmetric sleep spindles seen in the central head regions. INTERPRETATION: This is a normal electroencephalogram with the patient awake and asleep, showing no clear focal abnormalities or epileptiform activity. A normal EEG does not rule out a diagnosis of epilepsy or seizures. One may consider pursuing a prolonged study. Clinical correlation recommended.     Joyce Nelson MD

## 2021-05-14 DIAGNOSIS — E11.9 TYPE 2 DIABETES MELLITUS WITHOUT COMPLICATION, WITHOUT LONG-TERM CURRENT USE OF INSULIN (HCC): ICD-10-CM

## 2021-05-18 RX ORDER — METFORMIN HYDROCHLORIDE 1000 MG/1
TABLET ORAL
Qty: 240 TAB | Refills: 1 | Status: SHIPPED | OUTPATIENT
Start: 2021-05-18 | End: 2022-09-23

## 2021-05-26 ENCOUNTER — TELEPHONE (OUTPATIENT)
Dept: NEUROLOGY | Age: 46
End: 2021-05-26

## 2021-05-26 DIAGNOSIS — G43.711 INTRACTABLE CHRONIC MIGRAINE WITHOUT AURA AND WITH STATUS MIGRAINOSUS: ICD-10-CM

## 2021-05-26 RX ORDER — ERENUMAB-AOOE 140 MG/ML
INJECTION, SOLUTION SUBCUTANEOUS
Qty: 1 ML | Refills: 11 | Status: SHIPPED | OUTPATIENT
Start: 2021-05-26 | End: 2021-07-02 | Stop reason: SDUPTHER

## 2021-05-26 NOTE — TELEPHONE ENCOUNTER
----- Message from Ramón  sent at 5/26/2021 10:20 AM EDT -----  Regarding: /telephone  Medication Refill    Caller (if not patient):      Relationship of caller (if not patient):      Best contact number(s):[phone]      Name of medication and dosage if known:Aimovig      Is patient out of this medication (yes/no):Yes. Pharmacy name: [facility]. Pharmacy listed in chart? (yes/no):Yes.   Pharmacy phone number:

## 2021-06-01 DIAGNOSIS — G43.711 INTRACTABLE CHRONIC MIGRAINE WITHOUT AURA AND WITH STATUS MIGRAINOSUS: ICD-10-CM

## 2021-06-07 RX ORDER — ZONISAMIDE 100 MG/1
CAPSULE ORAL
Qty: 120 CAPSULE | Refills: 3 | Status: SHIPPED | OUTPATIENT
Start: 2021-06-07 | End: 2021-07-02 | Stop reason: SDUPTHER

## 2021-07-02 ENCOUNTER — OFFICE VISIT (OUTPATIENT)
Dept: NEUROLOGY | Age: 46
End: 2021-07-02
Payer: COMMERCIAL

## 2021-07-02 VITALS
WEIGHT: 243 LBS | SYSTOLIC BLOOD PRESSURE: 104 MMHG | OXYGEN SATURATION: 97 % | DIASTOLIC BLOOD PRESSURE: 70 MMHG | RESPIRATION RATE: 16 BRPM | BODY MASS INDEX: 44.72 KG/M2 | HEIGHT: 62 IN | HEART RATE: 76 BPM

## 2021-07-02 DIAGNOSIS — F51.01 PRIMARY INSOMNIA: ICD-10-CM

## 2021-07-02 DIAGNOSIS — R11.0 NAUSEA: ICD-10-CM

## 2021-07-02 DIAGNOSIS — G93.2 PSEUDOTUMOR CEREBRI: ICD-10-CM

## 2021-07-02 DIAGNOSIS — M79.7 FIBROMYALGIA: ICD-10-CM

## 2021-07-02 DIAGNOSIS — G43.711 INTRACTABLE CHRONIC MIGRAINE WITHOUT AURA AND WITH STATUS MIGRAINOSUS: Primary | ICD-10-CM

## 2021-07-02 DIAGNOSIS — G89.4 CHRONIC PAIN SYNDROME: Chronic | ICD-10-CM

## 2021-07-02 PROCEDURE — 20553 NJX 1/MLT TRIGGER POINTS 3/>: CPT | Performed by: PSYCHIATRY & NEUROLOGY

## 2021-07-02 PROCEDURE — 99215 OFFICE O/P EST HI 40 MIN: CPT | Performed by: PSYCHIATRY & NEUROLOGY

## 2021-07-02 RX ORDER — TRAZODONE HYDROCHLORIDE 50 MG/1
50 TABLET ORAL 2 TIMES DAILY
Qty: 180 TABLET | Refills: 3 | Status: SHIPPED | OUTPATIENT
Start: 2021-07-02 | End: 2021-10-01

## 2021-07-02 RX ORDER — ACETAZOLAMIDE 500 MG/1
CAPSULE, EXTENDED RELEASE ORAL
Qty: 180 CAPSULE | Refills: 3 | Status: SHIPPED | OUTPATIENT
Start: 2021-07-02 | End: 2021-10-01

## 2021-07-02 RX ORDER — DULOXETIN HYDROCHLORIDE 30 MG/1
30 CAPSULE, DELAYED RELEASE ORAL DAILY
Qty: 90 CAPSULE | Refills: 3 | Status: SHIPPED | OUTPATIENT
Start: 2021-07-02 | End: 2021-10-01

## 2021-07-02 RX ORDER — RIMEGEPANT SULFATE 75 MG/75MG
75 TABLET, ORALLY DISINTEGRATING ORAL EVERY OTHER DAY
Qty: 15 TABLET | Refills: 5 | Status: SHIPPED | OUTPATIENT
Start: 2021-07-02 | End: 2021-10-01

## 2021-07-02 RX ORDER — TIZANIDINE 4 MG/1
TABLET ORAL
Qty: 90 TABLET | Refills: 3 | Status: SHIPPED | OUTPATIENT
Start: 2021-07-02 | End: 2022-10-26 | Stop reason: SDUPTHER

## 2021-07-02 RX ORDER — DULOXETIN HYDROCHLORIDE 60 MG/1
60 CAPSULE, DELAYED RELEASE ORAL DAILY
Qty: 90 CAPSULE | Refills: 3 | Status: SHIPPED | OUTPATIENT
Start: 2021-07-02 | End: 2021-10-01

## 2021-07-02 RX ORDER — ERENUMAB-AOOE 140 MG/ML
INJECTION, SOLUTION SUBCUTANEOUS
Qty: 1 ML | Refills: 11 | Status: SHIPPED | OUTPATIENT
Start: 2021-07-02 | End: 2021-10-01

## 2021-07-02 RX ORDER — ONDANSETRON 4 MG/1
TABLET, FILM COATED ORAL
Qty: 90 TABLET | Refills: 3 | Status: SHIPPED | OUTPATIENT
Start: 2021-07-02

## 2021-07-02 RX ORDER — ELETRIPTAN HYDROBROMIDE 40 MG/1
40 TABLET, FILM COATED ORAL
Qty: 36 TABLET | Refills: 3 | Status: SHIPPED | OUTPATIENT
Start: 2021-07-02 | End: 2022-08-31 | Stop reason: SDUPTHER

## 2021-07-02 RX ORDER — ZONISAMIDE 100 MG/1
CAPSULE ORAL
Qty: 360 CAPSULE | Refills: 3 | Status: SHIPPED | OUTPATIENT
Start: 2021-07-02 | End: 2022-08-31 | Stop reason: SDUPTHER

## 2021-07-02 RX ORDER — QUETIAPINE FUMARATE 50 MG/1
TABLET, FILM COATED ORAL
Qty: 90 TABLET | Refills: 3 | Status: SHIPPED | OUTPATIENT
Start: 2021-07-02 | End: 2022-01-12 | Stop reason: SDUPTHER

## 2021-07-02 RX ORDER — TRAZODONE HYDROCHLORIDE 100 MG/1
TABLET ORAL
Qty: 90 TABLET | Refills: 3 | Status: SHIPPED | OUTPATIENT
Start: 2021-07-02 | End: 2022-01-12 | Stop reason: SDUPTHER

## 2021-07-02 RX ORDER — PREGABALIN 200 MG/1
200 CAPSULE ORAL 2 TIMES DAILY
Qty: 180 CAPSULE | Refills: 3 | Status: SHIPPED | OUTPATIENT
Start: 2021-07-02 | End: 2022-09-22 | Stop reason: ALTCHOICE

## 2021-07-02 NOTE — PROGRESS NOTES
Chief Complaint   Patient presents with    Follow-up     patient states that her migraines and has had this one for 2 weeks.

## 2021-07-02 NOTE — PROGRESS NOTES
Follow-up Visit    Name Emery Dubois Age 50 y.o. MRN [id number]  1975       Chief Complaint: head pain    Unrelenting headache for two weeks. Compliant with medications. No recent health changes. No changes with regards to the symptoms associated with or the presentation of the current migraine. No lateralizing signs. Migraine is right retrorbital mostly. It is throbbing  We discussed the available medications and treatments which have been exhausted. We discussed the availability of an infusion which she was not too keen on. Discussed possibly starting Nurtec on every other day basis which is a new dictation for chronic migraine prevention. Assesment and Plan  1. Intractable chronic migraine without aura and with status migrainosus    - rimegepant (Nurtec ODT) 75 mg disintegrating tablet; Take 1 Tablet by mouth every other day. Dispense: 15 Tablet; Refill: 5  - zonisamide (ZONEGRAN) 100 mg capsule; TAKE 2 CAPSULES BY MOUTH TWICE DAILY  Dispense: 360 Capsule; Refill: 3  - erenumab-aooe (Aimovig Autoinjector) 140 mg/mL injection; ADMINISTER 1 ML UNDER THE SKIN 1 TIME FOR 1 DOSE  Dispense: 1 mL; Refill: 11  - acetaZOLAMIDE SR (DIAMOX) 500 mg capsule; TAKE 1 CAPSULE BY MOUTH TWICE DAILY  Dispense: 180 Capsule; Refill: 3  - eletriptan (RELPAX) 40 mg tablet; Take 1 Tablet by mouth daily as needed (migraine. May repeat in 40 minutes no more than 2 in 24 hours). Dispense: 36 Tablet; Refill: 3    Medication, rationale, route of administration, adverse reactions and alternatives were discussed in detail and all questions were answered. 2. Pseudotumor cerebri  Continue diamox      3. Fibromyalgia    - DULoxetine (CYMBALTA) 30 mg capsule; Take 1 Capsule by mouth daily. Indications: chronic muscle or bone pain  Dispense: 90 Capsule; Refill: 3  - DULoxetine (CYMBALTA) 60 mg capsule; Take 1 Capsule by mouth daily. Dispense: 90 Capsule; Refill: 3  - pregabalin (LYRICA) 200 mg capsule;  Take 1 Capsule by mouth two (2) times a day. Max Daily Amount: 400 mg. Dispense: 180 Capsule; Refill: 3  - tiZANidine (ZANAFLEX) 4 mg tablet; Take one in the day and two at night  Dispense: 90 Tablet; Refill: 3      Trigger points -performed    4. Primary insomnia    - QUEtiapine (SEROquel) 50 mg tablet; TAKE 1 TABLET BY MOUTH EVERY NIGHT  Dispense: 90 Tablet; Refill: 3  - traZODone (DESYREL) 100 mg tablet; TAKE 1 TABLET BY MOUTH EVERY NIGHT  Dispense: 90 Tablet; Refill: 3  - traZODone (DESYREL) 50 mg tablet; Take 1 Tablet by mouth two (2) times a day. Dispense: 180 Tablet; Refill: 3    5. Nausea  History of gastric bypass  - ondansetron hcl (ZOFRAN) 4 mg tablet; TAKE 1 TABLET BY MOUTH DAILY AS NEEDED FOR NAUSEA OR VOMITING  Dispense: 90 Tablet; Refill: 3      Allergies  Lemon, Adhesive tape-silicones, Dilaudid [hydromorphone (pf)], Fentanyl, and Percocet [oxycodone-acetaminophen]     Medications  Current Outpatient Medications   Medication Sig    rimegepant (Nurtec ODT) 75 mg disintegrating tablet Take 1 Tablet by mouth every other day.  zonisamide (ZONEGRAN) 100 mg capsule TAKE 2 CAPSULES BY MOUTH TWICE DAILY    erenumab-aooe (Aimovig Autoinjector) 140 mg/mL injection ADMINISTER 1 ML UNDER THE SKIN 1 TIME FOR 1 DOSE    DULoxetine (CYMBALTA) 30 mg capsule Take 1 Capsule by mouth daily. Indications: chronic muscle or bone pain    QUEtiapine (SEROquel) 50 mg tablet TAKE 1 TABLET BY MOUTH EVERY NIGHT    acetaZOLAMIDE SR (DIAMOX) 500 mg capsule TAKE 1 CAPSULE BY MOUTH TWICE DAILY    traZODone (DESYREL) 100 mg tablet TAKE 1 TABLET BY MOUTH EVERY NIGHT    DULoxetine (CYMBALTA) 60 mg capsule Take 1 Capsule by mouth daily.  ondansetron hcl (ZOFRAN) 4 mg tablet TAKE 1 TABLET BY MOUTH DAILY AS NEEDED FOR NAUSEA OR VOMITING    traZODone (DESYREL) 50 mg tablet Take 1 Tablet by mouth two (2) times a day.  pregabalin (LYRICA) 200 mg capsule Take 1 Capsule by mouth two (2) times a day. Max Daily Amount: 400 mg.     tiZANidine (ZANAFLEX) 4 mg tablet Take one in the day and two at night    eletriptan (RELPAX) 40 mg tablet Take 1 Tablet by mouth daily as needed (migraine. May repeat in 40 minutes no more than 2 in 24 hours).  metFORMIN (GLUCOPHAGE) 1,000 mg tablet TAKE 1 TABLET BY MOUTH TWICE A DAY(WITH MORNING AND EVENING MEAL)    lancets misc Check blood glucose once daily. Diagnosis code E 11.9    glucose blood VI test strips (ASCENSIA AUTODISC VI, ONE TOUCH ULTRA TEST VI) strip Check blood glucose level once daily for diabetes E 11.9    lancets (OneTouch UltraSoft Lancets) misc Check blood glucose once daily for diabetes, E 11.9    Insulin Needles, Disposable, 32 gauge x 5/32\" ndle 100 Each by Does Not Apply route daily.  Syringe-Needle, Safety,Disp Un 3 mL 25 gauge x 1\" syrg Use as directed    cyanocobalamin (Vitamin B-12) 1,000 mcg/mL injection Intramuscular injection of 1000 mcg every week for 4 weeks then every month  Indications: inadequate vitamin B12    onabotulinumtoxinA (Botox) 200 unit injection 200 units by IM route once every 12 weeks. Inject IM neck/face, 31 FDA approved sites. Indication: Migraine prevention. DX code: G43.71    omeprazole (PRILOSEC) 20 mg capsule Take 20 mg by mouth daily.  busPIRone (BUSPAR) 5 mg tablet Take  by mouth.  atenolol (TENORMIN) 100 mg tablet Take 1 Tab by mouth daily.  fluticasone-salmeterol (ADVAIR DISKUS) 250-50 mcg/dose diskus inhaler Take 1 Puff by inhalation every twelve (12) hours.  acyclovir (ZOVIRAX) 200 mg capsule Take 200 mg by mouth two (2) times a day.  diphenhydrAMINE (BENADRYL) 25 mg capsule Take 25 mg by mouth as needed. Indications: Allerigc to [facility] - only takes with evette     No current facility-administered medications for this visit.         Medical History  Past Medical History:   Diagnosis Date    Asthma     Diabetes ([facility] [address]     Fibromyalgia     Hypertension     Lower back injury 02/13/2017    Migraines     Nausea and vomiting 1/14/2014    Other ill-defined conditions(799.89)     cerebral tumor, endometriosis, fibromyalgia, herpes    Right lower quadrant abdominal mass 1/14/2014    Seizures (HCC)     Sleep apnea     intolerant to CPAP    Unspecified adverse effect of anesthesia     pt states that she has an asthma attack when coming out of  anesthesia       Review of Systems   Eyes: Negative for blurred vision and double vision. Respiratory: Negative for cough and shortness of breath. Cardiovascular: Negative for chest pain, palpitations and orthopnea. Gastrointestinal: Negative for nausea and vomiting. Musculoskeletal: Positive for myalgias. Neurological: Positive for tingling and headaches. Negative for dizziness. Psychiatric/Behavioral: Positive for memory loss. Negative for depression and suicidal ideas. The patient has insomnia. The patient is not nervous/anxious. Exam:  Visit Vitals  /70 (BP 1 Location: Left upper arm, BP Patient Position: Sitting, BP Cuff Size: Adult)   Pulse 76   Resp 16   Ht 5' 2\" (1.575 m)   Wt 243 lb (110.2 kg)   SpO2 97%   BMI 44.45 kg/m²        General: Well developed, well nourished. Patient in no apparent distress   Head: Normocephalic, atraumatic, anicteric sclera   Neck Normal ROM, No thyromegally   Lungs:  Clear to auscultation    Cardiac: Regular rate and rhythm with no murmurs. Abd: Bowel sounds were audible. Ext: No pedal edema   Skin: Supple no rash     NeurologicExam:  Mental Status: Alert and oriented to person place and time   Speech: Fluent no aphasia or dysarthria. Cranial Nerves:  II - XII Intact   Motor:  Full and symmetric strength of upper and lower extremities. Normal bulk and tone. Reflexes:   Deep tendon reflexes 2+/4 and symmetric. Sensory:   Symmetric and intact with no perceived deficits . Gait:  Gait is balanced  with normal arm swing. Tremor:   No tremor noted. Cerebellar:  Coordination intact. Neurovascular: No carotid bruits.  No JVD          Lab Review  Lab Results   Component Value Date/Time    WBC 6.3 07/25/2019 11:35 AM    HCT 41.8 07/25/2019 11:35 AM    HGB 12.6 07/25/2019 11:35 AM    PLATELET 704 68/50/1624 11:35 AM       Lab Results   Component Value Date/Time    Sodium 146 (H) 07/25/2019 11:35 AM    Potassium 4.2 07/25/2019 11:35 AM    Chloride 112 (H) 07/25/2019 11:35 AM    CO2 22 07/25/2019 11:35 AM    Glucose 84 07/25/2019 11:35 AM    BUN 21 07/25/2019 11:35 AM    Creatinine 0.86 07/25/2019 11:35 AM    Calcium 9.0 07/25/2019 11:35 AM       Lab Results   Component Value Date/Time    Vitamin B12 1,502 (H) 07/25/2019 11:35 AM    Folate >20.0 07/25/2019 11:35 AM     60  minutes spent more than 50% spent in chart review and face to face  examination, discussion of treatment options and approach

## 2021-07-06 NOTE — PROCEDURES
A trigger point injection was performed at the site of maximal tenderness using 10ml  0.75 % plain marcaine. This was well tolerated, and followed by excellent relief of pain.   These were performed at bilateral trapezii as well as bilateral occipitalis

## 2021-07-13 ENCOUNTER — TELEPHONE (OUTPATIENT)
Dept: NEUROLOGY | Age: 46
End: 2021-07-13

## 2021-09-22 ENCOUNTER — TRANSCRIBE ORDER (OUTPATIENT)
Dept: SCHEDULING | Age: 46
End: 2021-09-22

## 2021-09-22 DIAGNOSIS — K92.1 HEMATOCHEZIA: Primary | ICD-10-CM

## 2021-09-22 DIAGNOSIS — R10.32 LLQ ABDOMINAL PAIN: ICD-10-CM

## 2021-10-01 ENCOUNTER — HOSPITAL ENCOUNTER (OUTPATIENT)
Age: 46
Setting detail: OBSERVATION
Discharge: HOME | End: 2021-10-02
Attending: EMERGENCY MEDICINE | Admitting: INTERNAL MEDICINE
Payer: COMMERCIAL

## 2021-10-01 DIAGNOSIS — K62.5 BRIGHT RED BLOOD PER RECTUM: ICD-10-CM

## 2021-10-01 DIAGNOSIS — R42 LIGHTHEADEDNESS: ICD-10-CM

## 2021-10-01 DIAGNOSIS — N17.9 ACUTE KIDNEY INJURY (HCC): Primary | ICD-10-CM

## 2021-10-01 DIAGNOSIS — R25.1 TREMORS OF NERVOUS SYSTEM: ICD-10-CM

## 2021-10-01 PROBLEM — R53.1 WEAKNESS GENERALIZED: Status: ACTIVE | Noted: 2021-10-01

## 2021-10-01 LAB
ALBUMIN SERPL-MCNC: 2.8 G/DL (ref 3.5–5)
ALBUMIN/GLOB SERPL: 0.7 {RATIO} (ref 1.1–2.2)
ALP SERPL-CCNC: 129 U/L (ref 45–117)
ALT SERPL-CCNC: 16 U/L (ref 12–78)
ANION GAP SERPL CALC-SCNC: 3 MMOL/L (ref 5–15)
APPEARANCE UR: CLEAR
AST SERPL-CCNC: 16 U/L (ref 15–37)
ATRIAL RATE: 70 BPM
BACTERIA URNS QL MICRO: NEGATIVE /HPF
BASOPHILS # BLD: 0 K/UL (ref 0–0.1)
BASOPHILS NFR BLD: 0 % (ref 0–1)
BILIRUB SERPL-MCNC: 0.3 MG/DL (ref 0.2–1)
BILIRUB UR QL: NEGATIVE
BUN SERPL-MCNC: 25 MG/DL (ref 6–20)
BUN/CREAT SERPL: 12 (ref 12–20)
CALCIUM SERPL-MCNC: 8.2 MG/DL (ref 8.5–10.1)
CALCULATED P AXIS, ECG09: 32 DEGREES
CALCULATED R AXIS, ECG10: 10 DEGREES
CALCULATED T AXIS, ECG11: 16 DEGREES
CHLORIDE SERPL-SCNC: 117 MMOL/L (ref 97–108)
CO2 SERPL-SCNC: 22 MMOL/L (ref 21–32)
COLOR UR: NORMAL
CREAT SERPL-MCNC: 2.1 MG/DL (ref 0.55–1.02)
DIAGNOSIS, 93000: NORMAL
DIFFERENTIAL METHOD BLD: ABNORMAL
EOSINOPHIL # BLD: 0.2 K/UL (ref 0–0.4)
EOSINOPHIL NFR BLD: 3 % (ref 0–7)
EPITH CASTS URNS QL MICRO: NORMAL /LPF
ERYTHROCYTE [DISTWIDTH] IN BLOOD BY AUTOMATED COUNT: 15.4 % (ref 11.5–14.5)
GLOBULIN SER CALC-MCNC: 3.8 G/DL (ref 2–4)
GLUCOSE BLD STRIP.AUTO-MCNC: 112 MG/DL (ref 65–117)
GLUCOSE BLD STRIP.AUTO-MCNC: 143 MG/DL (ref 65–117)
GLUCOSE BLD STRIP.AUTO-MCNC: 268 MG/DL (ref 65–117)
GLUCOSE BLD STRIP.AUTO-MCNC: 83 MG/DL (ref 65–117)
GLUCOSE BLD STRIP.AUTO-MCNC: 99 MG/DL (ref 65–117)
GLUCOSE SERPL-MCNC: 88 MG/DL (ref 65–100)
GLUCOSE UR STRIP.AUTO-MCNC: NEGATIVE MG/DL
HCT VFR BLD AUTO: 40.9 % (ref 35–47)
HGB BLD-MCNC: 12.2 G/DL (ref 11.5–16)
HGB UR QL STRIP: NEGATIVE
HYALINE CASTS URNS QL MICRO: NORMAL /LPF (ref 0–5)
IMM GRANULOCYTES # BLD AUTO: 0 K/UL (ref 0–0.04)
IMM GRANULOCYTES NFR BLD AUTO: 0 % (ref 0–0.5)
KETONES UR QL STRIP.AUTO: NEGATIVE MG/DL
LEUKOCYTE ESTERASE UR QL STRIP.AUTO: NEGATIVE
LYMPHOCYTES # BLD: 2.1 K/UL (ref 0.8–3.5)
LYMPHOCYTES NFR BLD: 27 % (ref 12–49)
MAGNESIUM SERPL-MCNC: 2.2 MG/DL (ref 1.6–2.4)
MCH RBC QN AUTO: 27.1 PG (ref 26–34)
MCHC RBC AUTO-ENTMCNC: 29.8 G/DL (ref 30–36.5)
MCV RBC AUTO: 90.9 FL (ref 80–99)
MONOCYTES # BLD: 0.8 K/UL (ref 0–1)
MONOCYTES NFR BLD: 10 % (ref 5–13)
NEUTS SEG # BLD: 4.4 K/UL (ref 1.8–8)
NEUTS SEG NFR BLD: 60 % (ref 32–75)
NITRITE UR QL STRIP.AUTO: NEGATIVE
NRBC # BLD: 0 K/UL (ref 0–0.01)
NRBC BLD-RTO: 0 PER 100 WBC
P-R INTERVAL, ECG05: 156 MS
PH UR STRIP: 5.5 [PH] (ref 5–8)
PLATELET # BLD AUTO: 209 K/UL (ref 150–400)
PMV BLD AUTO: 11.3 FL (ref 8.9–12.9)
POTASSIUM SERPL-SCNC: 4.4 MMOL/L (ref 3.5–5.1)
PROT SERPL-MCNC: 6.6 G/DL (ref 6.4–8.2)
PROT UR STRIP-MCNC: NEGATIVE MG/DL
Q-T INTERVAL, ECG07: 388 MS
QRS DURATION, ECG06: 84 MS
QTC CALCULATION (BEZET), ECG08: 419 MS
RBC # BLD AUTO: 4.5 M/UL (ref 3.8–5.2)
RBC #/AREA URNS HPF: NORMAL /HPF (ref 0–5)
SERVICE CMNT-IMP: ABNORMAL
SERVICE CMNT-IMP: ABNORMAL
SERVICE CMNT-IMP: NORMAL
SODIUM SERPL-SCNC: 142 MMOL/L (ref 136–145)
SP GR UR REFRACTOMETRY: 1.01 (ref 1–1.03)
TROPONIN I SERPL-MCNC: <0.05 NG/ML
UA: UC IF INDICATED,UAUC: NORMAL
UROBILINOGEN UR QL STRIP.AUTO: 0.2 EU/DL (ref 0.2–1)
VENTRICULAR RATE, ECG03: 70 BPM
WBC # BLD AUTO: 7.5 K/UL (ref 3.6–11)
WBC URNS QL MICRO: NORMAL /HPF (ref 0–4)

## 2021-10-01 PROCEDURE — 82962 GLUCOSE BLOOD TEST: CPT

## 2021-10-01 PROCEDURE — C9113 INJ PANTOPRAZOLE SODIUM, VIA: HCPCS | Performed by: INTERNAL MEDICINE

## 2021-10-01 PROCEDURE — 83735 ASSAY OF MAGNESIUM: CPT

## 2021-10-01 PROCEDURE — 81001 URINALYSIS AUTO W/SCOPE: CPT

## 2021-10-01 PROCEDURE — 85025 COMPLETE CBC W/AUTO DIFF WBC: CPT

## 2021-10-01 PROCEDURE — 74011250636 HC RX REV CODE- 250/636: Performed by: INTERNAL MEDICINE

## 2021-10-01 PROCEDURE — 2709999900 HC NON-CHARGEABLE SUPPLY

## 2021-10-01 PROCEDURE — 96374 THER/PROPH/DIAG INJ IV PUSH: CPT

## 2021-10-01 PROCEDURE — 93005 ELECTROCARDIOGRAM TRACING: CPT

## 2021-10-01 PROCEDURE — 74011250636 HC RX REV CODE- 250/636: Performed by: EMERGENCY MEDICINE

## 2021-10-01 PROCEDURE — 36415 COLL VENOUS BLD VENIPUNCTURE: CPT

## 2021-10-01 PROCEDURE — 99218 HC RM OBSERVATION: CPT

## 2021-10-01 PROCEDURE — 94640 AIRWAY INHALATION TREATMENT: CPT

## 2021-10-01 PROCEDURE — 74011000250 HC RX REV CODE- 250: Performed by: NURSE PRACTITIONER

## 2021-10-01 PROCEDURE — 65660000000 HC RM CCU STEPDOWN

## 2021-10-01 PROCEDURE — 96360 HYDRATION IV INFUSION INIT: CPT

## 2021-10-01 PROCEDURE — 80053 COMPREHEN METABOLIC PANEL: CPT

## 2021-10-01 PROCEDURE — 74011250637 HC RX REV CODE- 250/637: Performed by: EMERGENCY MEDICINE

## 2021-10-01 PROCEDURE — 74011250637 HC RX REV CODE- 250/637: Performed by: INTERNAL MEDICINE

## 2021-10-01 PROCEDURE — 84484 ASSAY OF TROPONIN QUANT: CPT

## 2021-10-01 PROCEDURE — 96361 HYDRATE IV INFUSION ADD-ON: CPT

## 2021-10-01 PROCEDURE — 74011636637 HC RX REV CODE- 636/637: Performed by: INTERNAL MEDICINE

## 2021-10-01 PROCEDURE — 77030029684 HC NEB SM VOL KT MONA -A

## 2021-10-01 PROCEDURE — 99284 EMERGENCY DEPT VISIT MOD MDM: CPT

## 2021-10-01 PROCEDURE — 74011000250 HC RX REV CODE- 250: Performed by: INTERNAL MEDICINE

## 2021-10-01 RX ORDER — DULOXETIN HYDROCHLORIDE 30 MG/1
30 CAPSULE, DELAYED RELEASE ORAL DAILY
Status: DISCONTINUED | OUTPATIENT
Start: 2021-10-01 | End: 2021-10-01

## 2021-10-01 RX ORDER — ONDANSETRON 4 MG/1
4 TABLET, ORALLY DISINTEGRATING ORAL
Status: DISCONTINUED | OUTPATIENT
Start: 2021-10-01 | End: 2021-10-02 | Stop reason: HOSPADM

## 2021-10-01 RX ORDER — ATORVASTATIN CALCIUM 20 MG/1
20 TABLET, FILM COATED ORAL DAILY
COMMUNITY
End: 2022-01-12 | Stop reason: SDUPTHER

## 2021-10-01 RX ORDER — POLYETHYLENE GLYCOL 3350 17 G/17G
17 POWDER, FOR SOLUTION ORAL DAILY PRN
Status: DISCONTINUED | OUTPATIENT
Start: 2021-10-01 | End: 2021-10-02 | Stop reason: HOSPADM

## 2021-10-01 RX ORDER — ENOXAPARIN SODIUM 100 MG/ML
40 INJECTION SUBCUTANEOUS DAILY
Status: DISCONTINUED | OUTPATIENT
Start: 2021-10-01 | End: 2021-10-01

## 2021-10-01 RX ORDER — PREGABALIN 100 MG/1
100 CAPSULE ORAL 2 TIMES DAILY
Status: DISCONTINUED | OUTPATIENT
Start: 2021-10-01 | End: 2021-10-02 | Stop reason: HOSPADM

## 2021-10-01 RX ORDER — ONDANSETRON 2 MG/ML
4 INJECTION INTRAMUSCULAR; INTRAVENOUS
Status: DISCONTINUED | OUTPATIENT
Start: 2021-10-01 | End: 2021-10-02 | Stop reason: HOSPADM

## 2021-10-01 RX ORDER — MELATONIN
1000 DAILY
COMMUNITY

## 2021-10-01 RX ORDER — QUETIAPINE FUMARATE 25 MG/1
50 TABLET, FILM COATED ORAL
Status: DISCONTINUED | OUTPATIENT
Start: 2021-10-01 | End: 2021-10-02 | Stop reason: HOSPADM

## 2021-10-01 RX ORDER — BUSPIRONE HYDROCHLORIDE 5 MG/1
5 TABLET ORAL 2 TIMES DAILY
Status: DISCONTINUED | OUTPATIENT
Start: 2021-10-01 | End: 2021-10-02 | Stop reason: HOSPADM

## 2021-10-01 RX ORDER — BUTALBITAL, ACETAMINOPHEN AND CAFFEINE 50; 325; 40 MG/1; MG/1; MG/1
1 TABLET ORAL
Status: DISCONTINUED | OUTPATIENT
Start: 2021-10-01 | End: 2021-10-02 | Stop reason: HOSPADM

## 2021-10-01 RX ORDER — ASCORBIC ACID 500 MG
500 TABLET ORAL DAILY
COMMUNITY

## 2021-10-01 RX ORDER — BUTALBITAL, ACETAMINOPHEN AND CAFFEINE 50; 325; 40 MG/1; MG/1; MG/1
1 TABLET ORAL
Status: COMPLETED | OUTPATIENT
Start: 2021-10-01 | End: 2021-10-01

## 2021-10-01 RX ORDER — ACETAMINOPHEN 650 MG/1
650 SUPPOSITORY RECTAL
Status: DISCONTINUED | OUTPATIENT
Start: 2021-10-01 | End: 2021-10-02 | Stop reason: HOSPADM

## 2021-10-01 RX ORDER — ACETAMINOPHEN 325 MG/1
650 TABLET ORAL
Status: DISCONTINUED | OUTPATIENT
Start: 2021-10-01 | End: 2021-10-02 | Stop reason: HOSPADM

## 2021-10-01 RX ORDER — ATENOLOL 50 MG/1
100 TABLET ORAL DAILY
COMMUNITY
End: 2022-07-13 | Stop reason: SDUPTHER

## 2021-10-01 RX ORDER — DULOXETIN HYDROCHLORIDE 30 MG/1
60 CAPSULE, DELAYED RELEASE ORAL 2 TIMES DAILY
Status: DISCONTINUED | OUTPATIENT
Start: 2021-10-01 | End: 2021-10-02 | Stop reason: HOSPADM

## 2021-10-01 RX ORDER — THERA TABS 400 MCG
1 TAB ORAL DAILY
COMMUNITY

## 2021-10-01 RX ORDER — ALBUTEROL SULFATE 0.83 MG/ML
2.5 SOLUTION RESPIRATORY (INHALATION)
Status: DISCONTINUED | OUTPATIENT
Start: 2021-10-01 | End: 2021-10-02 | Stop reason: HOSPADM

## 2021-10-01 RX ORDER — MAGNESIUM SULFATE 100 %
4 CRYSTALS MISCELLANEOUS AS NEEDED
Status: DISCONTINUED | OUTPATIENT
Start: 2021-10-01 | End: 2021-10-02 | Stop reason: HOSPADM

## 2021-10-01 RX ORDER — DEXTROSE 50 % IN WATER (D50W) INTRAVENOUS SYRINGE
12.5-25 AS NEEDED
Status: DISCONTINUED | OUTPATIENT
Start: 2021-10-01 | End: 2021-10-02 | Stop reason: HOSPADM

## 2021-10-01 RX ORDER — METAXALONE 800 MG/1
800 TABLET ORAL 3 TIMES DAILY
COMMUNITY
End: 2022-07-13 | Stop reason: SDUPTHER

## 2021-10-01 RX ORDER — ACETAZOLAMIDE 500 MG/1
1000 CAPSULE, EXTENDED RELEASE ORAL 2 TIMES DAILY
COMMUNITY
End: 2022-09-22 | Stop reason: ALTCHOICE

## 2021-10-01 RX ORDER — ALBUTEROL SULFATE 90 UG/1
1 AEROSOL, METERED RESPIRATORY (INHALATION)
COMMUNITY

## 2021-10-01 RX ORDER — INSULIN LISPRO 100 [IU]/ML
INJECTION, SOLUTION INTRAVENOUS; SUBCUTANEOUS
Status: DISCONTINUED | OUTPATIENT
Start: 2021-10-01 | End: 2021-10-02 | Stop reason: HOSPADM

## 2021-10-01 RX ORDER — TRAZODONE HYDROCHLORIDE 100 MG/1
100 TABLET ORAL
Status: DISCONTINUED | OUTPATIENT
Start: 2021-10-01 | End: 2021-10-02 | Stop reason: HOSPADM

## 2021-10-01 RX ORDER — SODIUM CHLORIDE 9 MG/ML
125 INJECTION, SOLUTION INTRAVENOUS CONTINUOUS
Status: DISCONTINUED | OUTPATIENT
Start: 2021-10-01 | End: 2021-10-02 | Stop reason: HOSPADM

## 2021-10-01 RX ORDER — CYANOCOBALAMIN 1000 UG/ML
1000 INJECTION, SOLUTION INTRAMUSCULAR; SUBCUTANEOUS
COMMUNITY

## 2021-10-01 RX ADMIN — INSULIN LISPRO 2 UNITS: 100 INJECTION, SOLUTION INTRAVENOUS; SUBCUTANEOUS at 17:44

## 2021-10-01 RX ADMIN — DULOXETINE HYDROCHLORIDE 60 MG: 30 CAPSULE, DELAYED RELEASE ORAL at 17:44

## 2021-10-01 RX ADMIN — PREGABALIN 100 MG: 100 CAPSULE ORAL at 17:44

## 2021-10-01 RX ADMIN — BUSPIRONE HYDROCHLORIDE 5 MG: 5 TABLET ORAL at 17:44

## 2021-10-01 RX ADMIN — SODIUM CHLORIDE 125 ML/HR: 9 INJECTION, SOLUTION INTRAVENOUS at 12:24

## 2021-10-01 RX ADMIN — SODIUM CHLORIDE 125 ML/HR: 9 INJECTION, SOLUTION INTRAVENOUS at 20:32

## 2021-10-01 RX ADMIN — ALBUTEROL SULFATE 2.5 MG: 2.5 SOLUTION RESPIRATORY (INHALATION) at 22:50

## 2021-10-01 RX ADMIN — PREGABALIN 100 MG: 100 CAPSULE ORAL at 12:28

## 2021-10-01 RX ADMIN — BUTALBITAL, ACETAMINOPHEN, AND CAFFEINE 1 TABLET: 50; 325; 40 TABLET ORAL at 09:02

## 2021-10-01 RX ADMIN — TRAZODONE HYDROCHLORIDE 100 MG: 100 TABLET ORAL at 22:07

## 2021-10-01 RX ADMIN — DULOXETINE HYDROCHLORIDE 60 MG: 30 CAPSULE, DELAYED RELEASE ORAL at 14:43

## 2021-10-01 RX ADMIN — SODIUM CHLORIDE 500 ML: 9 INJECTION, SOLUTION INTRAVENOUS at 09:02

## 2021-10-01 RX ADMIN — SODIUM CHLORIDE 40 MG: 9 INJECTION, SOLUTION INTRAMUSCULAR; INTRAVENOUS; SUBCUTANEOUS at 12:25

## 2021-10-01 RX ADMIN — BUSPIRONE HYDROCHLORIDE 5 MG: 5 TABLET ORAL at 12:30

## 2021-10-01 RX ADMIN — BUTALBITAL, ACETAMINOPHEN, AND CAFFEINE 1 TABLET: 50; 325; 40 TABLET ORAL at 19:11

## 2021-10-01 RX ADMIN — ACETAMINOPHEN 650 MG: 325 TABLET ORAL at 12:30

## 2021-10-01 RX ADMIN — QUETIAPINE FUMARATE 50 MG: 25 TABLET ORAL at 22:07

## 2021-10-01 NOTE — H&P
Hospitalist Admission Note    NAME: Mau Leon   :  1975   MRN:  [id number]     Date/Time:  10/1/2021 9:50 AM    Patient PCP: Hanane Mckeon MD  _____________________________________________________________________  Given the patient's current clinical presentation, I have a high level of concern for decompensation if discharged from the emergency department. Complex decision making was performed, which includes reviewing the patient's available past medical records, laboratory results, and x-ray films. My assessment of this patient's clinical condition and my plan of care is as follows. Assessment / Plan:    Intermittent rectal bleeding x4 weeks  Recent nausea and vomiting  Hx Failed colonoscopy due to poor prep 2020  Rectal bleeding noted with bowel movements. Denies hematemesis  Hemoglobin 12. Follow daily counts  PPI  Allow clear liquids for now  Discussed with Dr Felicia Burkett. Patient has scheduled colonoscopy with Dr Terell Lazaro on . If she remains stable over the weekend, she can just keep the appointment. NAVI  Volume depletion  Normal saline volume expansion. Follow creatinine and urine output  Hold Diamox    Generalized weakness  Likely related to volume depletion/NAVI  PT OT eval and treat    DM2   Hold Metformin  Sliding scale insulin for now. Check A1c    Fibromyalgia  Pseudotumor cerebri  Prior admissions for encephalopathy and/or respiratory failure from opiate overdose  On Cymbalta, Seroquel, trazodone, Lyrica, BuSpar  Holding Diamox. I believe this was prescribed for her pseudotumor    Kidney stones  S/p right colectomy       Code Status:  Full  Surrogate Decision Maker:    DVT Prophylaxis: SCD  GI Prophylaxis: not indicated    Baseline: . Lives alone.   Works as a CNA        Subjective:   CHIEF COMPLAINT: Fatigue and feeling shaky    HISTORY OF PRESENT ILLNESS:     Mau Leon is a 50 y.o. female who presents with the above complaint. This started yesterday and she describes it as feeling lightheaded, easily fatigued and having difficulty standing or ambulating. She also reports experiencing bright red blood per rectum with bowel movements for the last 4 weeks. She has had occasional nausea and vomiting but no coffee-ground emesis or hematemesis. She saw her GI physician recently and she is scheduled for an outpatient colonoscopy. She also has a history of migraines and chronic headaches and currently has a headache. Denies taking aspirin or anticoagulants. We were asked to admit for work up and evaluation of the above problems.      Past Medical History:   Diagnosis Date    Asthma     Diabetes ([facility] [address]     Fibromyalgia     Hypertension     Lower back injury 02/13/2017    Migraines     Nausea and vomiting 1/14/2014    Other ill-defined conditions(799.89)     cerebral tumor, endometriosis, fibromyalgia, herpes    Right lower quadrant abdominal mass 1/14/2014    Seizures ([facility] [address]     Sleep apnea     intolerant to CPAP    Unspecified adverse effect of anesthesia     pt states that she has an asthma attack when coming out of  anesthesia        Past Surgical History:   Procedure Laterality Date    HX CHOLECYSTECTOMY      HX HEENT      wisdom teeth    HX OTHER SURGICAL      laproscopy on ovaries    HX OTHER SURGICAL      left wrist ganglion cystectomy    HX OTHER SURGICAL      lung mass removed    HX TOTAL COLECTOMY  3/14/2013    Rt colectomy for perf transverse diverticulum       Social History     Tobacco Use    Smoking status: Former Smoker    Smokeless tobacco: Current User    Tobacco comment: vapor   Substance Use Topics    Alcohol use: Not Currently     Comment: ocasionally        Family History   Problem Relation Age of Onset    Other Other         unable to obtain due to AMS    Hypertension Father     Heart Disease Father     Cancer Father     Elevated Lipids Father     Hypertension Mother    [facility] Diabetes Maternal Grandmother     Diabetes Maternal Grandfather     Lupus Sister     Hypertension Sister     Elevated Lipids Sister      Allergies   Allergen Reactions    Lemon Anaphylaxis    Adhesive Tape-Silicones Other (comments)     Pt reports it burns her skin    Dilaudid [Hydromorphone (Pf)] Itching    Fentanyl Shortness of Breath    Percocet [Oxycodone-Acetaminophen] Nausea and Vomiting     Pt states not allergic! Prior to Admission medications    Medication Sig Start Date End Date Taking? Authorizing Provider   rimegepant (Nurtec ODT) 75 mg disintegrating tablet Take 1 Tablet by mouth every other day. 7/2/21   Alex Arcos MD   zonisamide (ZONEGRAN) 100 mg capsule TAKE 2 CAPSULES BY MOUTH TWICE DAILY 7/2/21   Alex Arcos MD   erenumab-aooe (Aimovig Autoinjector) 140 mg/mL injection ADMINISTER 1 ML UNDER THE SKIN 1 TIME FOR 1 DOSE 7/2/21   Alex Arcos MD   DULoxetine (CYMBALTA) 30 mg capsule Take 1 Capsule by mouth daily. Indications: chronic muscle or bone pain 7/2/21   Alex Arcos MD   QUEtiapine (SEROquel) 50 mg tablet TAKE 1 TABLET BY MOUTH EVERY NIGHT 7/2/21   Alex Arcos MD   acetaZOLAMIDE SR (DIAMOX) 500 mg capsule TAKE 1 CAPSULE BY MOUTH TWICE DAILY 7/2/21   Alex Arcos MD   traZODone (DESYREL) 100 mg tablet TAKE 1 TABLET BY MOUTH EVERY NIGHT 7/2/21   Alex Arcos MD   DULoxetine (CYMBALTA) 60 mg capsule Take 1 Capsule by mouth daily. 7/2/21   Alex Arcos MD   ondansetron hcl (ZOFRAN) 4 mg tablet TAKE 1 TABLET BY MOUTH DAILY AS NEEDED FOR NAUSEA OR VOMITING 7/2/21   Alex Arcos MD   traZODone (DESYREL) 50 mg tablet Take 1 Tablet by mouth two (2) times a day. 7/2/21   Alex Arcos MD   pregabalin (LYRICA) 200 mg capsule Take 1 Capsule by mouth two (2) times a day.  Max Daily Amount: 400 mg. 7/2/21   Alex Arcos MD   tiZANidine (ZANAFLEX) 4 mg tablet Take one in the day and two at night 7/2/21   Alex Arcos MD   eletriptan (RELPAX) 40 mg tablet Take 1 Tablet by mouth daily as needed (migraine. May repeat in 40 minutes no more than 2 in 24 hours). 7/2/21   Gabino Montes De Oca MD   metFORMIN (GLUCOPHAGE) 1,000 mg tablet TAKE 1 TABLET BY MOUTH TWICE A DAY(WITH MORNING AND EVENING MEAL) 5/18/21   Gabino Montes De Oca MD   lancets misc Check blood glucose once daily. Diagnosis code E 11.9 11/16/20   Emanuel Weeks MD   glucose blood VI test strips (ASCENSIA AUTODISC VI, ONE TOUCH ULTRA TEST VI) strip Check blood glucose level once daily for diabetes E 11.9 11/3/20   Emanuel Weeks MD   lancets (OneTouch UltraSoft Lancets) misc Check blood glucose once daily for diabetes, E 11.9 11/3/20   Emanuel Weeks MD   Insulin Needles, Disposable, 32 gauge x 5/32\" ndle 100 Each by Does Not Apply route daily. 10/16/20   Emanuel Weeks MD   Syringe-Needle, Safety,Disp Un 3 mL 25 gauge x 1\" syrg Use as directed 7/21/20   Gabino Montes De Oca MD   cyanocobalamin (Vitamin B-12) 1,000 mcg/mL injection Intramuscular injection of 1000 mcg every week for 4 weeks then every month  Indications: inadequate vitamin B12 7/21/20   Gabino Montes De Oca MD   onabotulinumtoxinA (Botox) 200 unit injection 200 units by IM route once every 12 weeks. Inject IM neck/face, 31 FDA approved sites. Indication: Migraine prevention. DX code: G43.71 7/14/20   Gabino Montes De Oca MD   omeprazole (PRILOSEC) 20 mg capsule Take 20 mg by mouth daily. Provider, Historical   busPIRone (BUSPAR) 5 mg tablet Take  by mouth. Provider, Historical   atenolol (TENORMIN) 100 mg tablet Take 1 Tab by mouth daily. 5/1/19   Gabino Montes De Oca MD   fluticasone-salmeterol (ADVAIR DISKUS) 250-50 mcg/dose diskus inhaler Take 1 Puff by inhalation every twelve (12) hours. Other, MD Arthur   acyclovir (ZOVIRAX) 200 mg capsule Take 200 mg by mouth two (2) times a day. Other, MD Arthur   diphenhydrAMINE (BENADRYL) 25 mg capsule Take 25 mg by mouth as needed. Indications:  Allerigc to [facility] - only takes with evette    Provider, Historical REVIEW OF SYSTEMS:       Total of 12 systems reviewed as follows:       POSITIVE= underlined text  Negative = text not underlined  General:  fever, chills, sweats, generalized weakness, weight loss/gain,      loss of appetite   Eyes:    blurred vision, eye pain, loss of vision, double vision  ENT:    rhinorrhea, pharyngitis   Respiratory:   cough, sputum production, SOB, SPENCER, wheezing, pleuritic pain   Cardiology:   chest pain, palpitations, orthopnea, PND, edema, syncope   Gastrointestinal:  abdominal pain , N/V, diarrhea, dysphagia, constipation, bleeding   Genitourinary:  frequency, urgency, dysuria, hematuria, incontinence   Muskuloskeletal :  arthralgia, myalgia, back pain  Hematology:  easy bruising, nose or gum bleeding, lymphadenopathy   Dermatological: rash, ulceration, pruritis, color change / jaundice  Endocrine:   hot flashes or polydipsia   Neurological:  headache, dizziness, confusion, focal weakness, paresthesia,     Speech difficulties, memory loss, gait difficulty, shaking / tremors  Psychological: Feelings of anxiety, depression, agitation    Objective:   VITALS:    Visit Vitals  BP 99/60   Pulse 73   Temp 98.4 °F (36.9 °C)   Resp 14   Ht 5' 1\" (1.549 m)   Wt 108.9 kg (240 lb)   SpO2 99%   BMI 45.35 kg/m²       PHYSICAL EXAM:    General:    Alert, cooperative, no distress, appears stated age. HEENT: Atraumatic, anicteric sclerae, pink conjunctivae     No oral ulcers, mucosa moist, throat clear, dentition fair  Neck:  Supple, symmetrical,  thyroid: non tender  Lungs:   Clear to auscultation bilaterally. No Wheezing or Rhonchi. No rales. Chest wall:  No tenderness  No Accessory muscle use. Heart:   Regular  rhythm,  No  murmur   No edema  Abdomen:   Soft, non-tender. Not distended. Bowel sounds normal  Extremities: No cyanosis. No clubbing,      Skin turgor normal, Capillary refill normal, Radial dial pulse 2+  Skin:     Not pale. Not Jaundiced  No rashes   Psych:  Good insight.   Not depressed. Not anxious or agitated. Neurologic: EOMs intact. No facial asymmetry. No aphasia or slurred speech. Symmetrical strength, Sensation grossly intact. Alert and oriented X 4.     _______________________________________________________________________  Care Plan discussed with:    Comments   Patient x    Family      RN     Care Manager                    Consultant:      _______________________________________________________________________  Expected  Disposition:   Home with Family x   HH/PT/OT/RN    SNF/LTC    [facility]    ________________________________________________________________________  TOTAL TIME: 39   Minutes    Critical Care Provided     Minutes non procedure based      Comments    x Reviewed previous records   >50% of visit spent in counseling and coordination of care  Discussion with patient and/or family and questions answered       Given the patient's current clinical presentation, I have a high level of concern for decompensation if discharged from the ED. Complex decision making was performed which includes reviewing the patient's available past medical records, laboratory results, and Xray films. I have also directly communicated my plan and discussed this case with the involved ED physician.         ____________________________________________________________________  Valere Landau, MD    Please note that this dictation was completed with [employer], the computer voice recognition software. Quite often unanticipated grammatical, syntax, homophones, and other interpretive errors are inadvertently transcribed by the computer software. Please disregard these errors. Please excuse any errors that have escaped final proofreading    Procedures: see electronic medical records for all procedures/Xrays and details which were not copied into this note but were reviewed prior to creation of Plan.     LAB DATA REVIEWED:    Recent Results (from the past 24 hour(s))   CBC WITH AUTOMATED DIFF    Collection Time: 10/01/21  8:31 AM   Result Value Ref Range    WBC 7.5 3.6 - 11.0 K/uL    RBC 4.50 3.80 - 5.20 M/uL    HGB 12.2 11.5 - 16.0 g/dL    HCT 40.9 35.0 - 47.0 %    MCV 90.9 80.0 - 99.0 FL    MCH 27.1 26.0 - 34.0 PG    MCHC 29.8 (L) 30.0 - 36.5 g/dL    RDW 15.4 (H) 11.5 - 14.5 %    PLATELET 506 276 - 028 K/uL    MPV 11.3 8.9 - 12.9 FL    NRBC 0.0 0  WBC    ABSOLUTE NRBC 0.00 0.00 - 0.01 K/uL    NEUTROPHILS 60 32 - 75 %    LYMPHOCYTES 27 12 - 49 %    MONOCYTES 10 5 - 13 %    EOSINOPHILS 3 0 - 7 %    BASOPHILS 0 0 - 1 %    IMMATURE GRANULOCYTES 0 0.0 - 0.5 %    ABS. NEUTROPHILS 4.4 1.8 - 8.0 K/UL    ABS. LYMPHOCYTES 2.1 0.8 - 3.5 K/UL    ABS. MONOCYTES 0.8 0.0 - 1.0 K/UL    ABS. EOSINOPHILS 0.2 0.0 - 0.4 K/UL    ABS. BASOPHILS 0.0 0.0 - 0.1 K/UL    ABS. IMM. GRANS. 0.0 0.00 - 0.04 K/UL    DF AUTOMATED     METABOLIC PANEL, COMPREHENSIVE    Collection Time: 10/01/21  8:31 AM   Result Value Ref Range    Sodium 142 136 - 145 mmol/L    Potassium 4.4 3.5 - 5.1 mmol/L    Chloride 117 (H) 97 - 108 mmol/L    CO2 22 21 - 32 mmol/L    Anion gap 3 (L) 5 - 15 mmol/L    Glucose 88 65 - 100 mg/dL    BUN 25 (H) 6 - 20 MG/DL    Creatinine 2.10 (H) 0.55 - 1.02 MG/DL    BUN/Creatinine ratio 12 12 - 20      GFR est AA 31 (L) >60 ml/min/1.73m2    GFR est non-AA 25 (L) >60 ml/min/1.73m2    Calcium 8.2 (L) 8.5 - 10.1 MG/DL    Bilirubin, total 0.3 0.2 - 1.0 MG/DL    ALT (SGPT) 16 12 - 78 U/L    AST (SGOT) 16 15 - 37 U/L    Alk.  phosphatase 129 (H) 45 - 117 U/L    Protein, total 6.6 6.4 - 8.2 g/dL    Albumin 2.8 (L) 3.5 - 5.0 g/dL    Globulin 3.8 2.0 - 4.0 g/dL    A-G Ratio 0.7 (L) 1.1 - 2.2     MAGNESIUM    Collection Time: 10/01/21  8:31 AM   Result Value Ref Range    Magnesium 2.2 1.6 - 2.4 mg/dL   TROPONIN I    Collection Time: 10/01/21  8:31 AM   Result Value Ref Range    Troponin-I, Qt. <0.05 <0.05 ng/mL   GLUCOSE, POC    Collection Time: 10/01/21  8:59 AM   Result Value Ref Range    Glucose (POC) 112 65 - 117 mg/dL    Performed by Christen Rich RN

## 2021-10-01 NOTE — ROUTINE PROCESS
Bedside shift change report given to Krishna Kovacs (oncoming nurse) by Jimena MENDOZA RN (offgoing nurse). Report included the following information SBAR, Kardex and MAR.

## 2021-10-01 NOTE — PROGRESS NOTES
Pharmacy Medication Reconciliation     The patient was interviewed regarding current PTA medication list, use and drug allergies; The patient was questioned regarding use of any other inhalers, topical products, over the counter medications, herbal medications, vitamin products or ophthalmic/nasal/otic medication use. Allergy Update: Lemon, Adhesive tape-silicones, Dilaudid [hydromorphone (pf)], Fentanyl, and Percocet [oxycodone-acetaminophen]    Recommendations/Findings: The following amendments were made to the patient's active medication list on file at [facility]:   1) Additions: albuterol inhaler PRN, atorvastatin 20 mg daily, metaxalone 800 mg TID, ascorbic acid 500 mg daily, cholecalciferol 1000 units/daily    2) Deletions: rimepegnant 75 mg as needed    3) Changes: duloxetine 60 mg BID      Pertinent Findings: patient states she has an injection that she takes daily for blood sugar but she cannot remember the name. States family member will visit tomorrow and will have the name written down. Clarified PTA med list with patient, insurance fill history. PTA medication list was corrected to the following:     Prior to Admission Medications   Prescriptions Last Dose Informant Taking? QUEtiapine (SEROquel) 50 mg tablet 9/30/2021 at Unknown time  Yes   Sig: TAKE 1 TABLET BY MOUTH EVERY NIGHT   acetaZOLAMIDE SR (DIAMOX) 500 mg capsule 9/30/2021 at Unknown time  Yes   Sig: Take 1,000 mg by mouth two (2) times a day. albuterol (PROVENTIL HFA, VENTOLIN HFA, PROAIR HFA) 90 mcg/actuation inhaler 9/1/2021 at Unknown time  Yes   Sig: Take 1 Puff by inhalation every six (6) hours as needed for Wheezing. ascorbic acid, vitamin C, (Vitamin C) 500 mg tablet 9/30/2021 at Unknown time  Yes   Sig: Take 500 mg by mouth daily. atenoloL (TENORMIN) 50 mg tablet 9/30/2021 at Unknown time  Yes   Sig: Take 100 mg by mouth daily.    atorvastatin (LIPITOR) 20 mg tablet 9/30/2021 at Unknown time  Yes   Sig: Take 20 mg by mouth daily.   busPIRone (BUSPAR) 5 mg tablet 2021 at Unknown time  Yes   Sig: Take 15 mg by mouth two (2) times a day. cholecalciferol (VITAMIN D3) (1000 Units /25 mcg) tablet 2021 at Unknown time  Yes   Sig: Take 1,000 Units by mouth daily. cyanocobalamin (VITAMIN B12) 1,000 mcg/mL injection 2021 at Unknown time  Yes   Si,000 mcg by IntraMUSCular route every . diphenhydrAMINE (BENADRYL) 25 mg capsule 2021 at Unknown time  Yes   Sig: Take 25 mg by mouth as needed. Indications: [facility]erigc to [facility] - only takes with evette   eletriptan (RELPAX) 40 mg tablet 2021 at Unknown time  Yes   Sig: Take 1 Tablet by mouth daily as needed (migraine. May repeat in 40 minutes no more than 2 in 24 hours). erenumab-aooe (AIMOVIG) 140 mg/mL injection 2021  Yes   Si mg by SubCUTAneous route every thirty (30) days. fluticasone-salmeterol (ADVAIR DISKUS) 250-50 mcg/dose diskus inhaler 2021 at Unknown time  Yes   Sig: Take 1 Puff by inhalation every twelve (12) hours. metFORMIN (GLUCOPHAGE) 1,000 mg tablet 2021 at Unknown time  Yes   Sig: TAKE 1 TABLET BY MOUTH TWICE A DAY(WITH MORNING AND EVENING MEAL)   metaxalone (SKELAXIN) 800 mg tablet 2021 at Unknown time  Yes   Sig: Take 800 mg by mouth three (3) times daily. omeprazole (PRILOSEC) 20 mg capsule 2021 at Unknown time  Yes   Sig: Take 20 mg by mouth daily. ondansetron hcl (ZOFRAN) 4 mg tablet 2021 at Unknown time  Yes   Sig: TAKE 1 TABLET BY MOUTH DAILY AS NEEDED FOR NAUSEA OR VOMITING   pregabalin (LYRICA) 200 mg capsule 2021 at Unknown time  Yes   Sig: Take 1 Capsule by mouth two (2) times a day. Max Daily Amount: 400 mg.   therapeutic multivitamin (THERAGRAN) tablet 2021 at Unknown time  Yes   Sig: Take 1 Tablet by mouth daily.    tiZANidine (ZANAFLEX) 4 mg tablet 2021 at Unknown time  Yes   Sig: Take one in the day and two at night   traZODone (DESYREL) 100 mg tablet 2021 at Unknown time  Yes   Sig: TAKE 1 TABLET BY MOUTH EVERY NIGHT   zonisamide (ZONEGRAN) 100 mg capsule 9/30/2021 at Unknown time  Yes   Sig: TAKE 2 CAPSULES BY MOUTH TWICE DAILY      Facility-Administered Medications: None        Thank you,  Sonu Garcia, PHARMD

## 2021-10-01 NOTE — ED PROVIDER NOTES
EMERGENCY DEPARTMENT HISTORY AND PHYSICAL EXAM      Date: 10/1/2021  Patient Name: Ghulam Williamson    History of Presenting Illness     Chief Complaint   Patient presents with    Fatigue     feeling jittery and shakey since yesterday evening she thinks it is her blood sugar.  Headache    Rectal Bleeding     after triage as pt being moved to a room she mentioned she has been bleeding from her rectum for 4 weeks       History Provided By: Patient    HPI: Ghulam Williamson, 50 y.o. female  presents to the ED with cc of lightheadedness, fatigue, headache, and shakiness. Patient states for 4 weeks she has been having rectal bleeding. She describes it as bright red blood per rectum with bowel movements. She has had occasional nausea and vomiting but no coffee-ground emesis or hematemesis. Seen by her GI physician within the past week he was scheduled her for colonoscopy but in the office did not comment to her on any possible source of bleeding that they could recognize. Patient states for the past 2 days she has been very fatigued. She gets lightheaded when standing up. She is very shaky. At one point her blood sugar was in the 70s but despite oral carbohydrates and getting not up to the 150s her symptoms did not change any. She does have a history of migraines and currently has a headache. She does not take any antiplatelets or anticoagulants.     Past History     Past Medical History:  Past Medical History:   Diagnosis Date    Asthma     Diabetes ([address]     Fibromyalgia     Hypertension     Lower back injury 02/13/2017    Migraines     Nausea and vomiting 1/14/2014    Other ill-defined conditions(799.89)     cerebral tumor, endometriosis, fibromyalgia, herpes    Right lower quadrant abdominal mass 1/14/2014    Seizures ([address]     Sleep apnea     intolerant to CPAP    Unspecified adverse effect of anesthesia     pt states that she has an asthma attack when coming out of  anesthesia Past Surgical History:  Past Surgical History:   Procedure Laterality Date    HX CHOLECYSTECTOMY      HX HEENT      wisdom teeth    HX OTHER SURGICAL      laproscopy on ovaries    HX OTHER SURGICAL      left wrist ganglion cystectomy    HX OTHER SURGICAL      lung mass removed    HX TOTAL COLECTOMY  3/14/2013    Rt colectomy for perf transverse diverticulum       Medications:  No current facility-administered medications on file prior to encounter. Current Outpatient Medications on File Prior to Encounter   Medication Sig Dispense Refill    rimegepant (Nurtec ODT) 75 mg disintegrating tablet Take 1 Tablet by mouth every other day. 15 Tablet 5    zonisamide (ZONEGRAN) 100 mg capsule TAKE 2 CAPSULES BY MOUTH TWICE DAILY 360 Capsule 3    erenumab-aooe (Aimovig Autoinjector) 140 mg/mL injection ADMINISTER 1 ML UNDER THE SKIN 1 TIME FOR 1 DOSE 1 mL 11    DULoxetine (CYMBALTA) 30 mg capsule Take 1 Capsule by mouth daily. Indications: chronic muscle or bone pain 90 Capsule 3    QUEtiapine (SEROquel) 50 mg tablet TAKE 1 TABLET BY MOUTH EVERY NIGHT 90 Tablet 3    acetaZOLAMIDE SR (DIAMOX) 500 mg capsule TAKE 1 CAPSULE BY MOUTH TWICE DAILY 180 Capsule 3    traZODone (DESYREL) 100 mg tablet TAKE 1 TABLET BY MOUTH EVERY NIGHT 90 Tablet 3    DULoxetine (CYMBALTA) 60 mg capsule Take 1 Capsule by mouth daily. 90 Capsule 3    ondansetron hcl (ZOFRAN) 4 mg tablet TAKE 1 TABLET BY MOUTH DAILY AS NEEDED FOR NAUSEA OR VOMITING 90 Tablet 3    traZODone (DESYREL) 50 mg tablet Take 1 Tablet by mouth two (2) times a day. 180 Tablet 3    pregabalin (LYRICA) 200 mg capsule Take 1 Capsule by mouth two (2) times a day. Max Daily Amount: 400 mg. 180 Capsule 3    tiZANidine (ZANAFLEX) 4 mg tablet Take one in the day and two at night 90 Tablet 3    eletriptan (RELPAX) 40 mg tablet Take 1 Tablet by mouth daily as needed (migraine. May repeat in 40 minutes no more than 2 in 24 hours).  36 Tablet 3    metFORMIN (GLUCOPHAGE) 1,000 mg tablet TAKE 1 TABLET BY MOUTH TWICE A DAY(WITH MORNING AND EVENING MEAL) 240 Tab 1    lancets misc Check blood glucose once daily. Diagnosis code E 11.9 1 Each 11    glucose blood VI test strips (ASCENSIA AUTODISC VI, ONE TOUCH ULTRA TEST VI) strip Check blood glucose level once daily for diabetes E 11.9 200 Strip 2    lancets (OneTouch UltraSoft Lancets) misc Check blood glucose once daily for diabetes, E 11.9 1 Package 11    Insulin Needles, Disposable, 32 gauge x 5/32\" ndle 100 Each by Does Not Apply route daily. 100 Pen Needle 4    Syringe-Needle, Safety,Disp Un 3 mL 25 gauge x 1\" syrg Use as directed 10 Pen Needle 1    cyanocobalamin (Vitamin B-12) 1,000 mcg/mL injection Intramuscular injection of 1000 mcg every week for 4 weeks then every month  Indications: inadequate vitamin B12 8 Vial 3    onabotulinumtoxinA (Botox) 200 unit injection 200 units by IM route once every 12 weeks. Inject IM neck/face, 31 FDA approved sites. Indication: Migraine prevention. DX code: G43.71 1 Vial 3    omeprazole (PRILOSEC) 20 mg capsule Take 20 mg by mouth daily.  busPIRone (BUSPAR) 5 mg tablet Take  by mouth.  atenolol (TENORMIN) 100 mg tablet Take 1 Tab by mouth daily. 90 Tab 3    fluticasone-salmeterol (ADVAIR DISKUS) 250-50 mcg/dose diskus inhaler Take 1 Puff by inhalation every twelve (12) hours.  acyclovir (ZOVIRAX) 200 mg capsule Take 200 mg by mouth two (2) times a day.  diphenhydrAMINE (BENADRYL) 25 mg capsule Take 25 mg by mouth as needed. Indications:  Allerigc to [facility] - only takes with evette         Family History:  Family History   Problem Relation Age of Onset    Other Other         unable to obtain due to AMS    Hypertension Father     Heart Disease Father     Cancer Father     Elevated Lipids Father     Hypertension Mother     Diabetes Maternal Grandmother     Diabetes Maternal Grandfather     Lupus Sister     Hypertension Sister    Floydene Ada Elevated Lipids Sister        Social History:  Social History     Tobacco Use    Smoking status: Former Smoker    Smokeless tobacco: Current User    Tobacco comment: vapor   Substance Use Topics    Alcohol use: Not Currently     Comment: ocasionally    Drug use: No       Allergies: Allergies   Allergen Reactions    Lemon Anaphylaxis    Adhesive Tape-Silicones Other (comments)     Pt reports it burns her skin    Dilaudid [Hydromorphone (Pf)] Itching    Fentanyl Shortness of Breath    Percocet [Oxycodone-Acetaminophen] Nausea and Vomiting     Pt states not allergic! All the above components of the past  history are auto-populated from the electronic record. They have been reviewed and the patient has been interviewed for any pertinent past history that pertains to the patient's chief complaint and reason for visit. Not all pre-populated components may be accurate at the time this note was generated. Review of Systems   Review of Systems   Constitutional: Positive for fatigue. Negative for chills and fever. HENT: Negative for congestion, ear pain, rhinorrhea, sore throat and trouble swallowing. Eyes: Negative for visual disturbance. Respiratory: Negative for cough, chest tightness and shortness of breath. Cardiovascular: Negative for chest pain and palpitations. Gastrointestinal: Positive for anal bleeding. Negative for abdominal pain, blood in stool, constipation, diarrhea, nausea and vomiting. Genitourinary: Negative for decreased urine volume, difficulty urinating, dysuria and frequency. Musculoskeletal: Negative for back pain and neck pain. Skin: Negative for color change and rash. Neurological: Positive for tremors, weakness, light-headedness and headaches. Negative for dizziness. Physical Exam   Physical Exam  Vitals and nursing note reviewed. Constitutional:       General: She is not in acute distress. Appearance: She is well-developed. She is obese.  She is not ill-appearing. HENT:      Head: Normocephalic. Eyes:      Conjunctiva/sclera: Conjunctivae normal.   Cardiovascular:      Rate and Rhythm: Normal rate and regular rhythm. Pulmonary:      Effort: Pulmonary effort is normal. No accessory muscle usage or respiratory distress. Abdominal:      General: There is no distension. Musculoskeletal:      Cervical back: Normal range of motion. Skin:     General: Skin is warm and dry. Neurological:      Mental Status: She is alert and oriented to person, place, and time. Due to the COVID-19 pandemic, in order to reduce the spread and transmission of the virus, some basic elements of the physical exam have been deferred to reduce direct or close contact with the patient unless they are deemed to be absolutely necessary, regardless of whether the virus is highly suspected or not. Diagnostic Study Results     Labs -     Recent Results (from the past 24 hour(s))   CBC WITH AUTOMATED DIFF    Collection Time: 10/01/21  8:31 AM   Result Value Ref Range    WBC 7.5 3.6 - 11.0 K/uL    RBC 4.50 3.80 - 5.20 M/uL    HGB 12.2 11.5 - 16.0 g/dL    HCT 40.9 35.0 - 47.0 %    MCV 90.9 80.0 - 99.0 FL    MCH 27.1 26.0 - 34.0 PG    MCHC 29.8 (L) 30.0 - 36.5 g/dL    RDW 15.4 (H) 11.5 - 14.5 %    PLATELET 790 547 - 793 K/uL    MPV 11.3 8.9 - 12.9 FL    NRBC 0.0 0  WBC    ABSOLUTE NRBC 0.00 0.00 - 0.01 K/uL    NEUTROPHILS 60 32 - 75 %    LYMPHOCYTES 27 12 - 49 %    MONOCYTES 10 5 - 13 %    EOSINOPHILS 3 0 - 7 %    BASOPHILS 0 0 - 1 %    IMMATURE GRANULOCYTES 0 0.0 - 0.5 %    ABS. NEUTROPHILS 4.4 1.8 - 8.0 K/UL    ABS. LYMPHOCYTES 2.1 0.8 - 3.5 K/UL    ABS. MONOCYTES 0.8 0.0 - 1.0 K/UL    ABS. EOSINOPHILS 0.2 0.0 - 0.4 K/UL    ABS. BASOPHILS 0.0 0.0 - 0.1 K/UL    ABS. IMM.  GRANS. 0.0 0.00 - 0.04 K/UL    DF AUTOMATED     METABOLIC PANEL, COMPREHENSIVE    Collection Time: 10/01/21  8:31 AM   Result Value Ref Range    Sodium 142 136 - 145 mmol/L    Potassium 4.4 3.5 - 5.1 mmol/L    Chloride 117 (H) 97 - 108 mmol/L    CO2 22 21 - 32 mmol/L    Anion gap 3 (L) 5 - 15 mmol/L    Glucose 88 65 - 100 mg/dL    BUN 25 (H) 6 - 20 MG/DL    Creatinine 2.10 (H) 0.55 - 1.02 MG/DL    BUN/Creatinine ratio 12 12 - 20      GFR est AA 31 (L) >60 ml/min/1.73m2    GFR est non-AA 25 (L) >60 ml/min/1.73m2    Calcium 8.2 (L) 8.5 - 10.1 MG/DL    Bilirubin, total 0.3 0.2 - 1.0 MG/DL    ALT (SGPT) 16 12 - 78 U/L    AST (SGOT) 16 15 - 37 U/L    Alk. phosphatase 129 (H) 45 - 117 U/L    Protein, total 6.6 6.4 - 8.2 g/dL    Albumin 2.8 (L) 3.5 - 5.0 g/dL    Globulin 3.8 2.0 - 4.0 g/dL    A-G Ratio 0.7 (L) 1.1 - 2.2     MAGNESIUM    Collection Time: 10/01/21  8:31 AM   Result Value Ref Range    Magnesium 2.2 1.6 - 2.4 mg/dL   TROPONIN I    Collection Time: 10/01/21  8:31 AM   Result Value Ref Range    Troponin-I, Qt. <0.05 <0.05 ng/mL   GLUCOSE, POC    Collection Time: 10/01/21  8:59 AM   Result Value Ref Range    Glucose (POC) 112 65 - 117 mg/dL    Performed by Fidel Asif RN        Radiologic Studies -   No orders to display     CT Results  (Last 48 hours)    None        CXR Results  (Last 48 hours)    None            Medical Decision Making     I reviewed the vital signs, available nursing notes, past medical history, past surgical history, family history and social history. Vital Signs-I have reviewed the vital signs that have been made available during the patient's emergency department visit. The vital signs auto-populated below are obtained mostly by electronic means through monitoring devices that have been downloaded into the patient's chart by the nursing staff. Some vital signs are not downloaded into the chart until after the patient has been discharged and this note has been completed, therefore some vital signs may not be available to the physician for review prior to patient's discharge or admission.   The physician has reviewed the patient's triage vital signs, monitored the electronic monitoring devices remotely for any significant vital sign abnormalities, and have reviewed vital signs prior to discharge. Some vital signs reviewed at bedside or remotely utilizing electronic monitoring devices may be different than the vital signs downloaded into the electronic medical record. Some vital signs may be erroneous and inaccurate since they are obtained by electronic monitoring devices, and not all vital signs are verified for accuracy by nursing staff prior to downloading into the patient's chart. Patient Vitals for the past 24 hrs:   Temp Pulse Resp BP SpO2   10/01/21 0733 98.4 °F (36.9 °C) 73 14 99/60 99 %         Records Reviewed: Nursing notes for today's visit have been reviewed. I have also reviewed most recent medical records pertinent to today's complaints, if available in our medical record system. I have also reviewed all labs and imaging results from previous results in comparison to results obtained today. If an EKG was obtained today, it has been compared to previous EKGs, if available. If arriving via EMS, the EMS report has been reviewed if made available to us within the patient's time in the emergency department. Provider Notes (Medical Decision Making):   Patient presents with lightheadedness and tremors. Patient has had lower GI bleeding for 4 weeks. I suspected that she may be anemic but her hemoglobin returned at 12.2. Do note that she has acute kidney injury with a creatinine of 2.1 today. Her GFR is calculated at 31. This is a pretty significant decline in someone who has no history of kidney disease. Creatinine levels were 0.89 two years ago. We will hydrate with IV fluids. Given the significant decline in kidney function will recommend admission. I will obtain urinalysis for further evaluation. She has not had any complaints of retention. ED Course:   Initial assessment performed.  The patients presenting problems have been discussed, and they are in agreement with the care plan formulated and outlined with them. I have encouraged them to ask questions as they arise throughout their visit. Orders Placed This Encounter    CBC WITH AUTOMATED DIFF    METABOLIC PANEL, COMPREHENSIVE    MAGNESIUM    TROPONIN I    URINALYSIS W/ REFLEX CULTURE    ADULT DIET Regular    POC GLUCOSE    EKG NOTEWRITER(ASAP ONLY)    NOTIFY PROVIDER: SPECIFY Notify provider on pt's arrival to floor ONE TIME STAT    OUT OF BED WITH ASSISTANCE    VITAL SIGNS PER UNIT ROUTINE    FULL CODE    GLUCOSE, POC    EKG, 12 LEAD, INITIAL    TYPE & SCREEN    INSERT PERIPHERAL IV ONE TIME STAT    butalbital-acetaminophen-caffeine (FIORICET, ESGIC) -40 mg per tablet 1 Tablet    sodium chloride 0.9 % bolus infusion 500 mL    acetaminophen (TYLENOL) tablet 650 mg    ondansetron (ZOFRAN) injection 4 mg    0.9% sodium chloride infusion    IP CONSULT TO HOSPITALIST       EKG    Date/Time: 10/1/2021 7:46 AM  Performed by: Lisha Tirado MD  Authorized by: Lisha Tirado MD     ECG reviewed by ED Physician in the absence of a cardiologist: yes    Rate:     ECG rate:  70    ECG rate assessment: normal    Rhythm:     Rhythm: sinus rhythm    Ectopy:     Ectopy: none    QRS:     QRS axis:  Normal  Conduction:     Conduction: normal    ST segments:     ST segments:  Normal  T waves:     T waves: non-specific            Critical Care Time:   0    Disposition:  Admit        Diagnosis     Clinical Impression:   1. Acute kidney injury ([address]    2. Lightheadedness    3. Tremors of nervous system    4.  Bright red blood per rectum

## 2021-10-01 NOTE — ROUTINE PROCESS
TRANSFER - IN REPORT:    Verbal report received from Krishna Houser(name) on Marilee Wang  being received from ED(unit) for routine progression of care      Report consisted of patients Situation, Background, Assessment and   Recommendations(SBAR). Information from the following report(s) SBAR, Kardex, Intake/Output, MAR and Accordion was reviewed with the receiving nurse. Opportunity for questions and clarification was provided.

## 2021-10-01 NOTE — CONSULTS
Gastroenterology Attending Physician (for Melody Riggs MD) attestation statement and comments. This patient was seen and examined by me in a face-to-face visit today. I reviewed the medical record including lab work, imaging and other provider notes. I confirmed the history as described above. I spoke to the patient, reviewed the medical record including lab work, imaging and other provider notes. I discussed this case in detail with GRETTA Kitchen NP. I formulated an  assessment of this patient and developed a treatment plan. I agree with the above consultation note. I agree with the history, exam and assessment and plan as outlined in the note. I would like to add the followinyo F with recurrent small volume rectal bleeding. She denies abd pain, c/o only of fatigue, headache, and occasional reflux/HB in setting of prior gastric sleeve. She was evaluated for same c/o in GI Julio C's office 2021 and has colonoscopy set up with him on 21. She has had a R colectomy . Poor colon prep in 2020. PE with obesity, RRR, CTA b/l, S, NT, ND. Labs reviewed with Hgb. Suspect GERD in setting of gastric sleeve. Rectal bleeidng suggestive of hemorrhoidal source. Will need OP exclusion via pending colonoscopy of polyp, mass, or inflammation  Plan:  1) Avoid NSAIDs; 2) Daily PPI for her GERD/HB; Keep scheduled colonoscopy appt for 21 with Melody Riggs. I discussed this plan with .  Will sign off.   Kristine Mercado MD            GI CONSULTATION NOTE  Megan Sandhu NP  [phone] NP in-hospital cell phone M-F until 4:30  After 5pm or on weekends, please call  for physician on call    NAME: Lee Ann Canada : 1975  MRN: [id number]   Attending: Dr. Jamal Estrella  Primary GI: Dr. Sandie Reed  Date/Time:  10/1/2021 1:49 PM  Assessment:   -Rectal bleeding, hematochezia  · Likely hemorrhoidal vs (less likely) diverticular  · Aunt with colon cancer, no first degree relatives  · S/p R colectomy 2013  -Migraine  -N/V, likely related to migraine  -NAVI, managed by primary team  -DM II, fibromyalgia, pseudotumor cerebri  GI Hx  -6/2020 CLN noted to have poor bowel prep  -2014 EGD with Dr. Carlota Portillo normal esophagus, small 2mm nodule was present on the esophageal side of zline. Sliding hiatal hernia, normal stomach and duodenum  -2014 CLN with Dr. Carlota Portillo noted to be normal  Plan:   -Patient with chronic rectal bleed, reassuring hemoglobin WNL and stable. Has scheduled colonoscopy 11/9/2021, would recommend to keep that appointment at this time for further evaluation/management.  -Supportive measure per primary team  -Avoid NSAIDs  -PPI 40mg daily  -Monitor for signs of overt GI bleeding, if continues will need serial hemoglobin to ensure not becoming anemic.  -Will sign off for now. Please call GI with any questions or concerns. Thank you! Plan discussed with Dr. Jeremie Boyd  Subjective:     HISTORY OF PRESENT ILLNESS:     Sukumar Alvarado is an 50 y.o.  female who we are asked to see for complaint of rectal bleed. Patient reports came to ER today because has had a migraine for a week. Reports today had significant fatigue, weakness, dizziness, and shaking. Reports has h/o migraines that are largely uncontrolled. When has migraines often will have N/V as well. No hematemesis or coffee ground emesis. Denies any abdominal pain. Reports has been having increased acid reflux lately, not currently on anything. Reports h/o gastric sleeve. Prior to this appetite had been stable, however always decreased during migraines. Overall weight has been stable. No changes in bowel habits. Having regular bowel movements. Reports hematochezia has been ongoing for awhile, seen in GI clinic 6/2021 for evaluation with decision to have colonoscopy, scheduled for 11/9/2021 with Dr. Frandy Arciniega. No blood thinners.        Past Medical History:   Diagnosis Date    Asthma     Diabetes ([facility] [address]     Fibromyalgia     Hypertension     Lower back injury 02/13/2017    Migraines     Nausea and vomiting 1/14/2014    Other ill-defined conditions(799.89)     cerebral tumor, endometriosis, fibromyalgia, herpes    Right lower quadrant abdominal mass 1/14/2014    Seizures ([facility] [address]     Sleep apnea     intolerant to CPAP    Unspecified adverse effect of anesthesia     pt states that she has an asthma attack when coming out of  anesthesia      Past Surgical History:   Procedure Laterality Date    HX CHOLECYSTECTOMY      HX HEENT      wisdom teeth    HX OTHER SURGICAL      laproscopy on ovaries    HX OTHER SURGICAL      left wrist ganglion cystectomy    HX OTHER SURGICAL      lung mass removed    HX TOTAL COLECTOMY  3/14/2013    Rt colectomy for perf transverse diverticulum     Social History     Tobacco Use    Smoking status: Former Smoker    Smokeless tobacco: Current User    Tobacco comment: vapor   Substance Use Topics    Alcohol use: Not Currently     Comment: ocasionally      Family History   Problem Relation Age of Onset    Other Other         unable to obtain due to AMS    Hypertension Father     Heart Disease Father     Cancer Father     Elevated Lipids Father     Hypertension Mother     Diabetes Maternal Grandmother     Diabetes Maternal Grandfather     Lupus Sister     Hypertension Sister     Elevated Lipids Sister       Allergies   Allergen Reactions    Lemon Anaphylaxis    Adhesive Tape-Silicones Other (comments)     Pt reports it burns her skin    Dilaudid [Hydromorphone (Pf)] Itching    Fentanyl Shortness of Breath    Percocet [Oxycodone-Acetaminophen] Nausea and Vomiting     Pt states not allergic! Prior to Admission medications    Medication Sig Start Date End Date Taking? Authorizing Provider   atorvastatin (LIPITOR) 20 mg tablet Take 20 mg by mouth daily. Yes Provider, Historical   acetaZOLAMIDE SR (DIAMOX) 500 mg capsule Take 1,000 mg by mouth two (2) times a day. Yes Provider, Historical   atenoloL (TENORMIN) 50 mg tablet Take 100 mg by mouth daily. Yes Provider, Historical   cyanocobalamin (VITAMIN B12) 1,000 mcg/mL injection 1,000 mcg by IntraMUSCular route every Sunday. Yes Provider, Historical   erenumab-aooe (AIMOVIG) 140 mg/mL injection 140 mg by SubCUTAneous route every thirty (30) days. 9/9/21  Yes Provider, Historical   metaxalone (SKELAXIN) 800 mg tablet Take 800 mg by mouth three (3) times daily. Yes Provider, Historical   albuterol (PROVENTIL HFA, VENTOLIN HFA, PROAIR HFA) 90 mcg/actuation inhaler Take 1 Puff by inhalation every six (6) hours as needed for Wheezing. Yes Provider, Historical   therapeutic multivitamin (THERAGRAN) tablet Take 1 Tablet by mouth daily. Yes Provider, Historical   ascorbic acid, vitamin C, (Vitamin C) 500 mg tablet Take 500 mg by mouth daily. Yes Provider, Historical   cholecalciferol (VITAMIN D3) (1000 Units /25 mcg) tablet Take 1,000 Units by mouth daily. Yes Provider, Historical   zonisamide (ZONEGRAN) 100 mg capsule TAKE 2 CAPSULES BY MOUTH TWICE DAILY 7/2/21  Yes Óscar Teixeira MD   QUEtiapine (SEROquel) 50 mg tablet TAKE 1 TABLET BY MOUTH EVERY NIGHT 7/2/21  Yes Óscar Teixeira MD   traZODone (DESYREL) 100 mg tablet TAKE 1 TABLET BY MOUTH EVERY NIGHT 7/2/21  Yes Óscar Teixeira MD   ondansetron hcl (ZOFRAN) 4 mg tablet TAKE 1 TABLET BY MOUTH DAILY AS NEEDED FOR NAUSEA OR VOMITING 7/2/21  Yes Óscar Teixeira MD   pregabalin (LYRICA) 200 mg capsule Take 1 Capsule by mouth two (2) times a day. Max Daily Amount: 400 mg. 7/2/21  Yes Óscar Teixeira MD   tiZANidine (ZANAFLEX) 4 mg tablet Take one in the day and two at night 7/2/21  Yes Óscar Teixeira MD   eletriptan (RELPAX) 40 mg tablet Take 1 Tablet by mouth daily as needed (migraine. May repeat in 40 minutes no more than 2 in 24 hours).  7/2/21  Yes Óscar Teixeira MD   metFORMIN (GLUCOPHAGE) 1,000 mg tablet TAKE 1 TABLET BY MOUTH TWICE A DAY(WITH MORNING AND EVENING MEAL) 5/18/21  Yes Sherlyn Amato MD   omeprazole (PRILOSEC) 20 mg capsule Take 20 mg by mouth daily. Yes Provider, Historical   busPIRone (BUSPAR) 5 mg tablet Take 15 mg by mouth two (2) times a day. Yes Provider, Historical   fluticasone-salmeterol (ADVAIR DISKUS) 250-50 mcg/dose diskus inhaler Take 1 Puff by inhalation every twelve (12) hours. Yes Other, MD Arthur   diphenhydrAMINE (BENADRYL) 25 mg capsule Take 25 mg by mouth as needed. Indications:  [facility] to [facility] - only takes with evette   Yes Provider, Historical       Patient Active Problem List   Diagnosis Code    Pseudotumor cerebri G93.2    Acute renal injury ([facility] [address] N17.9    Hyperkalemia E87.5    UTI (urinary tract infection) N39.0    Chronic pain G89.29    Obesity, Class III, BMI 40-49.9 (morbid obesity) ([facility]) E66.01    S/P right colectomy Z90.49    Nausea and vomiting R11.2    Right lower quadrant abdominal mass R19.03    Hypertension I10    Type II diabetes mellitus ([facility]) E11.9    Seizures ([facility]) R56.9    Sleep apnea G47.30    Fibromyalgia M79.7    UTI (lower urinary tract infection) N39.0    Weakness generalized R53.1    NAVI (acute kidney injury) ([facility] [address] N17.9       REVIEW OF SYSTEMS:    Constitutional: negative fever, negative chills, negative weight loss  Eyes:   negative visual changes  ENT:   negative sore throat, tongue or lip swelling   Respiratory:  negative cough, negative dyspnea  Cards:  negative for chest pain, palpitations, lower extremity edema  GI:   See HPI  :  negative for frequency, dysuria  Integument:  negative for rash and pruritus  Heme:  negative for easy bruising and gum/nose bleeding  Musculoskel:  + myalgias, weakness  Neuro: + for headaches, dizziness, neg vertigo  Psych:  negative for feelings of anxiety, depression     Pertinent Positives: fatigue, weakness, dizziness, hematochezia, migrain      Objective:   VITALS:    Visit Vitals  /64 (BP 1 Location: Left upper arm, BP Patient Position: Sitting)   Pulse 69   Temp 97.6 °F (36.4 °C)   Resp 17   Ht 5' 1\" (1.549 m)   Wt 108.9 kg (240 lb)   SpO2 100%   BMI 45.35 kg/m²       PHYSICAL EXAM:   General:          Alert, WD, WN, cooperative, no distress, appears stated age. Head:               Normocephalic, without obvious abnormality, atraumatic. Eyes:               Conjunctivae clear and pale, anicteric sclerae. Pupils are equal  Nose:               Nares normal. No drainage or sinus tenderness. Throat:             Lips, mucosa, and tongue normal.  No Thrush  Neck:               Symmetrical  Back:               Symmetric,  No CVA tenderness. Lungs:             CTA bilaterally. No wheezing/rhonchi/rales. Chest wall:      No tenderness or deformity. No Accessory muscle use. Heart:              Regular rate and rhythm,  no murmur, rub or gallop. Abdomen:        Soft, non-tender. Distended per body habitus. Bowel sounds normal. No masses  Extremities:     Atraumatic, No cyanosis. No edema. No clubbing  Skin:                Texture, turgor normal. No rashes/lesions/jaundice  Psych:             Good insight. Not depressed. Not anxious or agitated. Neurologic:      EOMs intact. No facial asymmetry. No aphasia or slurred speech. A/O X 3.      LAB DATA REVIEWED:    Recent Results (from the past 24 hour(s))   EKG, 12 LEAD, INITIAL    Collection Time: 10/01/21  7:46 AM   Result Value Ref Range    Ventricular Rate 70 BPM    Atrial Rate 70 BPM    P-R Interval 156 ms    QRS Duration 84 ms    Q-T Interval 388 ms    QTC Calculation (Bezet) 419 ms    Calculated P Axis 32 degrees    Calculated R Axis 10 degrees    Calculated T Axis 16 degrees    Diagnosis       Normal sinus rhythm  Nonspecific T wave abnormality  When compared with ECG of 10-JUL-2019 14:55,  No significant change was found     CBC WITH AUTOMATED DIFF    Collection Time: 10/01/21  8:31 AM   Result Value Ref Range    WBC 7.5 3.6 - 11.0 K/uL    RBC 4.50 3.80 - 5.20 M/uL    HGB 12.2 11.5 - 16.0 g/dL    HCT 40.9 35.0 - 47.0 %    MCV 90.9 80.0 - 99.0 FL    MCH 27.1 26.0 - 34.0 PG    MCHC 29.8 (L) 30.0 - 36.5 g/dL    RDW 15.4 (H) 11.5 - 14.5 %    PLATELET 491 283 - 543 K/uL    MPV 11.3 8.9 - 12.9 FL    NRBC 0.0 0  WBC    ABSOLUTE NRBC 0.00 0.00 - 0.01 K/uL    NEUTROPHILS 60 32 - 75 %    LYMPHOCYTES 27 12 - 49 %    MONOCYTES 10 5 - 13 %    EOSINOPHILS 3 0 - 7 %    BASOPHILS 0 0 - 1 %    IMMATURE GRANULOCYTES 0 0.0 - 0.5 %    ABS. NEUTROPHILS 4.4 1.8 - 8.0 K/UL    ABS. LYMPHOCYTES 2.1 0.8 - 3.5 K/UL    ABS. MONOCYTES 0.8 0.0 - 1.0 K/UL    ABS. EOSINOPHILS 0.2 0.0 - 0.4 K/UL    ABS. BASOPHILS 0.0 0.0 - 0.1 K/UL    ABS. IMM. GRANS. 0.0 0.00 - 0.04 K/UL    DF AUTOMATED     METABOLIC PANEL, COMPREHENSIVE    Collection Time: 10/01/21  8:31 AM   Result Value Ref Range    Sodium 142 136 - 145 mmol/L    Potassium 4.4 3.5 - 5.1 mmol/L    Chloride 117 (H) 97 - 108 mmol/L    CO2 22 21 - 32 mmol/L    Anion gap 3 (L) 5 - 15 mmol/L    Glucose 88 65 - 100 mg/dL    BUN 25 (H) 6 - 20 MG/DL    Creatinine 2.10 (H) 0.55 - 1.02 MG/DL    BUN/Creatinine ratio 12 12 - 20      GFR est AA 31 (L) >60 ml/min/1.73m2    GFR est non-AA 25 (L) >60 ml/min/1.73m2    Calcium 8.2 (L) 8.5 - 10.1 MG/DL    Bilirubin, total 0.3 0.2 - 1.0 MG/DL    ALT (SGPT) 16 12 - 78 U/L    AST (SGOT) 16 15 - 37 U/L    Alk.  phosphatase 129 (H) 45 - 117 U/L    Protein, total 6.6 6.4 - 8.2 g/dL    Albumin 2.8 (L) 3.5 - 5.0 g/dL    Globulin 3.8 2.0 - 4.0 g/dL    A-G Ratio 0.7 (L) 1.1 - 2.2     MAGNESIUM    Collection Time: 10/01/21  8:31 AM   Result Value Ref Range    Magnesium 2.2 1.6 - 2.4 mg/dL   TROPONIN I    Collection Time: 10/01/21  8:31 AM   Result Value Ref Range    Troponin-I, Qt. <0.05 <0.05 ng/mL   GLUCOSE, POC    Collection Time: 10/01/21  8:59 AM   Result Value Ref Range    Glucose (POC) 112 65 - 117 mg/dL    Performed by Aicha Acosta RN    GLUCOSE, POC    Collection Time: 10/01/21 12:20 PM   Result Value Ref Range    Glucose (POC) 99 65 - 117 mg/dL    Performed by Lidya Kim RN        IMAGING RESULTS:  I have personally reviewed the imaging reports      Total time spent with patient: 60 minutes ________________________________________________________________________  Care Plan discussed with:  Patient Y   Family     RN               Consultant:       CT  10/1/2021:  ________________________________________________________________________    ___________________________________________________  Consulting Provider: Radha Reynoso NP      10/1/2021  1:49 PM

## 2021-10-01 NOTE — PROGRESS NOTES
Transition of Care Plan:    RUR: 18 %, LOW risk   Disposition: Home   Follow up appointments: PCP, Specialists  DME needed: None  Transportation at Discharge: Patient's father to transport   Keys or means to access home:  Yes       IM Medicare Letter: N/A commercial   Is patient a BCPI-A Bundle:     Bundle letter will be distributed by unit CM if pt meets bundle criteria. If yes, was Bundle Letter given?:   Caregiver Contact: Mother Krishna Castellanos [phone]    Discharge Caregiver contacted prior to discharge? Unit CM to follow up before discharge    Reason for Admission:  Rectal Bleeding                    RUR Score:  18 %, low risk                    Plan for utilizing home health:    TBD PT/OT consults pending       PCP: First and Last name:  Carla Dimas MD     Name of Practice: [facility]    Are you a current patient: Yes/No: Yes   Approximate date of last visit: 3 weeks ago    Can you participate in a virtual visit with your PCP:  Yes                    Current Advanced Directive/Advance Care Plan: Full Code  Advance Care Planning     General Advance Care Planning (ACP) Conversation    Date of Conversation: 10/1/2021  Conducted with: Patient with Decision Making Capacity    Healthcare Decision Maker:   No healthcare decision makers have been documented. Click here to complete [address] including selection of the Healthcare Decision Maker Relationship (ie \"Primary\")    Today we documented Decision Maker(s) consistent with Legal Next of Kin hierarchy. Content/Action Overview:   DECLINED ACP conversation - will revisit periodically   Reviewed DNR/DNI and patient elects Full Code (Attempt Resuscitation)    Length of Voluntary ACP Conversation in minutes:  <16 minutes (Non-Billable)    Rebecca Cook RN                       Transition of Care Plan:                    CM met with patient at the bedside to discuss discharge planning.   Patient name, , and demographics all verified in chart. Patient lives in a two story home with her son and her son's girlfriend. Patient is independent of her ADLS/IDLS. Patient is employed as a CNA. Patient uses no DME at baseline. Patient preferred pharmacy is [facility] in [facility]. Patient has no IPR or SNF history. HH history for SN for wound care for unknown agency. Patient is active with her PCP, GI-Julio C, Neurology- Dr. Rina Woody. Care Management Interventions  PCP Verified by CM: Yes  Mode of Transport at Discharge: Other (see comment) (Patient's dad to transport )  Transition of Care Consult (CM Consult): Discharge Planning  Discharge Durable Medical Equipment: No  Physical Therapy Consult: Yes  Occupational Therapy Consult: Yes  Speech Therapy Consult: No  Support Systems: Child(alejandra), Parent(s)  Confirm Follow Up Transport: Self  Discharge Location  Discharge Placement: Home    Unit CM to continue to follow for discharge needs and planning.     AMALIA GregoryN, RN, BSW   RN Care Manager

## 2021-10-01 NOTE — ED NOTES
Assumed care of pt from triage. Pt is A+Ox4. Pt states she has been experiencing shaking \"all over\" since yesterday evening. Pt states she is having difficulty standing. She states she has experienced before and was evaluated for it with no explanation for her symptoms. Pt states she was admitted for this issue last time and does not remember the medication that alleviated her symptoms. Pt is experiencing rectal bleeding that she describes as bright red for 4 weeks, states she was told to have a colonoscopy done. Pt also complaining of a headache and vomiting for 1 week. Pt states her blood sugar was 71 yesterday morning. Pt placed on monitor x2.    1015 Pt assisted to restroom by PCT. US not able to be obtained at this time. 1200 Pt resting on stretcher in POC. [phone] Dr Nikole Roca (GI at bedside evaluating pt)    [phone] attempted to calll report, RN not available at this time    [phone] Patient is being transferred to [facility], Room # [phone]. Report given to , RN on Jaleel Harmonigan for routine progression of care. Report consisted of the following information SBAR, Kardex and ED Summary. Patient transferred to receiving unit by: transport    Outstanding consults needed: No     Next labs due: Yes    The following personal items will be sent with the patient during transfer to the floor:     All valuables:    Cardiac monitoring ordered: Yes    The following CURRENT information was reported to the receiving RN:    Code status: Full Code at time of transfer    Last set of vital signs:  Vital Signs  Level of Consciousness: Alert (0) (10/01/21 1237)  Temp: 97.6 °F (36.4 °C) (10/01/21 1237)  Temp Source: Oral (10/01/21 1237)  Pulse (Heart Rate): 69 (10/01/21 1340)  Heart Rate Source: Monitor (10/01/21 1340)  Resp Rate: 17 (10/01/21 1340)  BP: 104/64 (10/01/21 1340)  MAP (Calculated): 77 (10/01/21 1340)  BP 1 Location: Left upper arm (10/01/21 1340)  BP 1 Method: Automatic (10/01/21 1340)  BP Patient Position: Sitting (10/01/21 1340)  MEWS Score: 2 (10/01/21 1237)         Oxygen Therapy  O2 Sat (%): 100 % (10/01/21 1340)  O2 Device: None (Room air) (10/01/21 1340)      Last pain assessment:  Pain 1  Pain Scale 1: Numeric (0 - 10)  Pain Intensity 1: 9  Patient Stated Pain Goal: 0  Pain Reassessment 1: Yes  Pain Onset 1: yesterday  Pain Location 1: Head  Pain Description 1: Constant  Pain Intervention(s) 1: Medication (see MAR)      Wounds: No     Urinary catheter: voiding  Is there a soto order: No     LDAs:       Peripheral IV 10/01/21 Left Wrist (Active)   Site Assessment Clean, dry, & intact 10/01/21 1013   Phlebitis Assessment 0 10/01/21 1013   Infiltration Assessment 0 10/01/21 1013   Dressing Status Clean, dry, & intact 10/01/21 1013   Hub Color/Line Status Blue 10/01/21 1013         Opportunity for questions and clarification was provided.     Amber Casillas RN

## 2021-10-01 NOTE — PROGRESS NOTES
Received notification from bedside RN about patient with regards to: requesting PTA inhaler for her asthma  VS: /77, HR 65, RR 18, O2 sat 98% on RA    Intervention given: Albuterol neb q 4 PRN ordered

## 2021-10-02 VITALS
DIASTOLIC BLOOD PRESSURE: 71 MMHG | HEIGHT: 61 IN | RESPIRATION RATE: 16 BRPM | OXYGEN SATURATION: 98 % | TEMPERATURE: 97.7 F | WEIGHT: 240 LBS | HEART RATE: 81 BPM | SYSTOLIC BLOOD PRESSURE: 120 MMHG | BODY MASS INDEX: 45.31 KG/M2

## 2021-10-02 LAB
ALBUMIN SERPL-MCNC: 2.6 G/DL (ref 3.5–5)
ALBUMIN/GLOB SERPL: 0.8 {RATIO} (ref 1.1–2.2)
ALP SERPL-CCNC: 116 U/L (ref 45–117)
ALT SERPL-CCNC: 11 U/L (ref 12–78)
ANION GAP SERPL CALC-SCNC: 1 MMOL/L (ref 5–15)
AST SERPL-CCNC: 8 U/L (ref 15–37)
BASOPHILS # BLD: 0 K/UL (ref 0–0.1)
BASOPHILS NFR BLD: 0 % (ref 0–1)
BILIRUB SERPL-MCNC: 0.2 MG/DL (ref 0.2–1)
BUN SERPL-MCNC: 18 MG/DL (ref 6–20)
BUN/CREAT SERPL: 16 (ref 12–20)
CALCIUM SERPL-MCNC: 8.3 MG/DL (ref 8.5–10.1)
CHLORIDE SERPL-SCNC: 120 MMOL/L (ref 97–108)
CO2 SERPL-SCNC: 23 MMOL/L (ref 21–32)
CREAT SERPL-MCNC: 1.12 MG/DL (ref 0.55–1.02)
DIFFERENTIAL METHOD BLD: ABNORMAL
EOSINOPHIL # BLD: 0.3 K/UL (ref 0–0.4)
EOSINOPHIL NFR BLD: 4 % (ref 0–7)
ERYTHROCYTE [DISTWIDTH] IN BLOOD BY AUTOMATED COUNT: 15.4 % (ref 11.5–14.5)
EST. AVERAGE GLUCOSE BLD GHB EST-MCNC: 120 MG/DL
GLOBULIN SER CALC-MCNC: 3.3 G/DL (ref 2–4)
GLUCOSE BLD STRIP.AUTO-MCNC: 107 MG/DL (ref 65–117)
GLUCOSE BLD STRIP.AUTO-MCNC: 134 MG/DL (ref 65–117)
GLUCOSE SERPL-MCNC: 133 MG/DL (ref 65–100)
HBA1C MFR BLD: 5.8 % (ref 4–5.6)
HCT VFR BLD AUTO: 39.1 % (ref 35–47)
HGB BLD-MCNC: 11.7 G/DL (ref 11.5–16)
IMM GRANULOCYTES # BLD AUTO: 0 K/UL (ref 0–0.04)
IMM GRANULOCYTES NFR BLD AUTO: 0 % (ref 0–0.5)
LYMPHOCYTES # BLD: 2.2 K/UL (ref 0.8–3.5)
LYMPHOCYTES NFR BLD: 27 % (ref 12–49)
MAGNESIUM SERPL-MCNC: 2.1 MG/DL (ref 1.6–2.4)
MCH RBC QN AUTO: 27.1 PG (ref 26–34)
MCHC RBC AUTO-ENTMCNC: 29.9 G/DL (ref 30–36.5)
MCV RBC AUTO: 90.7 FL (ref 80–99)
MONOCYTES # BLD: 0.6 K/UL (ref 0–1)
MONOCYTES NFR BLD: 7 % (ref 5–13)
NEUTS SEG # BLD: 4.9 K/UL (ref 1.8–8)
NEUTS SEG NFR BLD: 62 % (ref 32–75)
NRBC # BLD: 0 K/UL (ref 0–0.01)
NRBC BLD-RTO: 0 PER 100 WBC
PLATELET # BLD AUTO: 192 K/UL (ref 150–400)
PMV BLD AUTO: 11.3 FL (ref 8.9–12.9)
POTASSIUM SERPL-SCNC: 3.8 MMOL/L (ref 3.5–5.1)
PROT SERPL-MCNC: 5.9 G/DL (ref 6.4–8.2)
RBC # BLD AUTO: 4.31 M/UL (ref 3.8–5.2)
SERVICE CMNT-IMP: ABNORMAL
SERVICE CMNT-IMP: NORMAL
SODIUM SERPL-SCNC: 144 MMOL/L (ref 136–145)
WBC # BLD AUTO: 8 K/UL (ref 3.6–11)

## 2021-10-02 PROCEDURE — 74011250637 HC RX REV CODE- 250/637: Performed by: INTERNAL MEDICINE

## 2021-10-02 PROCEDURE — 74011250636 HC RX REV CODE- 250/636: Performed by: EMERGENCY MEDICINE

## 2021-10-02 PROCEDURE — 36415 COLL VENOUS BLD VENIPUNCTURE: CPT

## 2021-10-02 PROCEDURE — 83735 ASSAY OF MAGNESIUM: CPT

## 2021-10-02 PROCEDURE — 99218 HC RM OBSERVATION: CPT

## 2021-10-02 PROCEDURE — 83036 HEMOGLOBIN GLYCOSYLATED A1C: CPT

## 2021-10-02 PROCEDURE — C9113 INJ PANTOPRAZOLE SODIUM, VIA: HCPCS | Performed by: INTERNAL MEDICINE

## 2021-10-02 PROCEDURE — 80053 COMPREHEN METABOLIC PANEL: CPT

## 2021-10-02 PROCEDURE — 74011000250 HC RX REV CODE- 250: Performed by: INTERNAL MEDICINE

## 2021-10-02 PROCEDURE — 85025 COMPLETE CBC W/AUTO DIFF WBC: CPT

## 2021-10-02 PROCEDURE — 74011250636 HC RX REV CODE- 250/636: Performed by: INTERNAL MEDICINE

## 2021-10-02 PROCEDURE — 96376 TX/PRO/DX INJ SAME DRUG ADON: CPT

## 2021-10-02 PROCEDURE — 82962 GLUCOSE BLOOD TEST: CPT

## 2021-10-02 RX ORDER — TRAMADOL HYDROCHLORIDE 50 MG/1
50 TABLET ORAL ONCE
Status: COMPLETED | OUTPATIENT
Start: 2021-10-02 | End: 2021-10-02

## 2021-10-02 RX ADMIN — SODIUM CHLORIDE 40 MG: 9 INJECTION, SOLUTION INTRAMUSCULAR; INTRAVENOUS; SUBCUTANEOUS at 10:41

## 2021-10-02 RX ADMIN — DULOXETINE HYDROCHLORIDE 60 MG: 30 CAPSULE, DELAYED RELEASE ORAL at 10:41

## 2021-10-02 RX ADMIN — TRAMADOL HYDROCHLORIDE 50 MG: 50 TABLET, COATED ORAL at 10:41

## 2021-10-02 RX ADMIN — BUSPIRONE HYDROCHLORIDE 5 MG: 5 TABLET ORAL at 10:41

## 2021-10-02 RX ADMIN — SODIUM CHLORIDE 125 ML/HR: 9 INJECTION, SOLUTION INTRAVENOUS at 04:40

## 2021-10-02 RX ADMIN — PREGABALIN 100 MG: 100 CAPSULE ORAL at 10:41

## 2021-10-02 RX ADMIN — BUTALBITAL, ACETAMINOPHEN, AND CAFFEINE 1 TABLET: 50; 325; 40 TABLET ORAL at 04:41

## 2021-10-02 NOTE — DISCHARGE SUMMARY
Hospitalist Discharge Summary     Patient ID:  Benson Bond  [id number]  82 y.o.  1975  10/1/2021    PCP on record: Seng Lynne MD    Admit date: 10/1/2021  Discharge date and time: 10/2/2021    DISCHARGE DIAGNOSIS:    See below    CONSULTATIONS:  None    Excerpted HPI from H&P of Stew Romero MD:  Benson Bond is a 50 y.o. female who presents with the above complaint. This started yesterday and she describes it as feeling lightheaded, easily fatigued and having difficulty standing or ambulating. She also reports experiencing bright red blood per rectum with bowel movements for the last 4 weeks. She has had occasional nausea and vomiting but no coffee-ground emesis or hematemesis.    She saw her GI physician recently and she is scheduled for an outpatient colonoscopy. She also has a history of migraines and chronic headaches and currently has a headache. Denies taking aspirin or anticoagulants.    ______________________________________________________________________  DISCHARGE SUMMARY/HOSPITAL COURSE:  for full details see H&P, daily progress notes, labs, consult notes. Intermittent rectal bleeding x4 weeks  Recent nausea and vomiting  Hx Failed colonoscopy due to poor prep 6/2/2020  Rectal bleeding noted with bowel movements. Denies hematemesis  Hemoglobin 12. Follow daily counts  PPI  Allow clear liquids for now  Plan for outpatient colonoscopy already scheduled prior to presentation in ED for 11/9/2021     NAVI  Volume depletion  Normal saline volume expansion. Follow creatinine and urine output  Hold Diamox     Generalized weakness  Likely related to volume depletion/NAVI  PT OT eval and treat     DM2   Hold Metformin  Sliding scale insulin for now. Check A1c     Fibromyalgia  Pseudotumor cerebri  Prior admissions for encephalopathy and/or respiratory failure from opiate overdose  On Cymbalta, Seroquel, trazodone, Lyrica, BuSpar  Holding Diamox.   I believe this was prescribed for her pseudotumor     Kidney stones  S/p right colectomy 2013    _______________________________________________________________________  Patient seen and examined by me on discharge day. Pertinent Findings:  Gen:    Not in distress  Chest: Clear lungs  CVS:   Regular rhythm. No edema  Abd:  Soft, not distended, not tender  Neuro:  Alert, oriented x 3  _______________________________________________________________________  DISCHARGE MEDICATIONS:   Current Discharge Medication List      CONTINUE these medications which have NOT CHANGED    Details   atorvastatin (LIPITOR) 20 mg tablet Take 20 mg by mouth daily. acetaZOLAMIDE SR (DIAMOX) 500 mg capsule Take 1,000 mg by mouth two (2) times a day. atenoloL (TENORMIN) 50 mg tablet Take 100 mg by mouth daily. cyanocobalamin (VITAMIN B12) 1,000 mcg/mL injection 1,000 mcg by IntraMUSCular route every Sunday. erenumab-aooe (AIMOVIG) 140 mg/mL injection 140 mg by SubCUTAneous route every thirty (30) days. metaxalone (SKELAXIN) 800 mg tablet Take 800 mg by mouth three (3) times daily. albuterol (PROVENTIL HFA, VENTOLIN HFA, PROAIR HFA) 90 mcg/actuation inhaler Take 1 Puff by inhalation every six (6) hours as needed for Wheezing. therapeutic multivitamin (THERAGRAN) tablet Take 1 Tablet by mouth daily. ascorbic acid, vitamin C, (Vitamin C) 500 mg tablet Take 500 mg by mouth daily. cholecalciferol (VITAMIN D3) (1000 Units /25 mcg) tablet Take 1,000 Units by mouth daily.       zonisamide (ZONEGRAN) 100 mg capsule TAKE 2 CAPSULES BY MOUTH TWICE DAILY  Qty: 360 Capsule, Refills: 3    Associated Diagnoses: Intractable chronic migraine without aura and with status migrainosus      QUEtiapine (SEROquel) 50 mg tablet TAKE 1 TABLET BY MOUTH EVERY NIGHT  Qty: 90 Tablet, Refills: 3    Associated Diagnoses: Primary insomnia      traZODone (DESYREL) 100 mg tablet TAKE 1 TABLET BY MOUTH EVERY NIGHT  Qty: 90 Tablet, Refills: 3    Associated Diagnoses: Primary insomnia      ondansetron hcl (ZOFRAN) 4 mg tablet TAKE 1 TABLET BY MOUTH DAILY AS NEEDED FOR NAUSEA OR VOMITING  Qty: 90 Tablet, Refills: 3    Associated Diagnoses: Nausea      pregabalin (LYRICA) 200 mg capsule Take 1 Capsule by mouth two (2) times a day. Max Daily Amount: 400 mg. Qty: 180 Capsule, Refills: 3    Associated Diagnoses: Fibromyalgia      tiZANidine (ZANAFLEX) 4 mg tablet Take one in the day and two at night  Qty: 90 Tablet, Refills: 3    Associated Diagnoses: Fibromyalgia      eletriptan (RELPAX) 40 mg tablet Take 1 Tablet by mouth daily as needed (migraine. May repeat in 40 minutes no more than 2 in 24 hours). Qty: 36 Tablet, Refills: 3    Associated Diagnoses: Intractable chronic migraine without aura and with status migrainosus      metFORMIN (GLUCOPHAGE) 1,000 mg tablet TAKE 1 TABLET BY MOUTH TWICE A DAY(WITH MORNING AND EVENING MEAL)  Qty: 240 Tab, Refills: 1    Associated Diagnoses: Type 2 diabetes mellitus without complication, without long-term current use of insulin (HCC)      omeprazole (PRILOSEC) 20 mg capsule Take 20 mg by mouth daily. busPIRone (BUSPAR) 5 mg tablet Take 15 mg by mouth two (2) times a day. fluticasone-salmeterol (ADVAIR DISKUS) 250-50 mcg/dose diskus inhaler Take 1 Puff by inhalation every twelve (12) hours. diphenhydrAMINE (BENADRYL) 25 mg capsule Take 25 mg by mouth as needed. Indications: [facility] - only takes with evette               Patient Follow Up Instructions:    Activity: Activity as tolerated  Diet: Resume previous diet    Follow up with Dr Audrey Saucedo as scheduled in addition to below       Follow-up Information     Follow up With Specialties Details Why Contact Info    Arnav Bueno MD Family Medicine Schedule an appointment as soon as possible for a visit in 1 week  [address] [place] [address] [address] [place] [address]4 ________________________________________________________________    Risk of deterioration: Moderate    Condition at Discharge:  Stable  __________________________________________________________________    Disposition  Home with family, no needs    ____________________________________________________________________    Code Status: Full Code  ___________________________________________________________________      Total time in minutes spent coordinating this discharge (includes going over instructions, follow-up, prescriptions, and preparing report for sign off to her PCP) :  >30 minutes    Signed:  Jennyfer Magallanes DO

## 2021-10-02 NOTE — PROGRESS NOTES
Bedside shift change report given to [facility] RN (oncoming nurse) by Radha Robb RN (offgoing nurse). Report included the following information SBAR, Kardex, Intake/Output, MAR and Recent Results.

## 2021-10-02 NOTE — PROGRESS NOTES
Occupational Therapy note:    Order acknowledged, chart reviewed, and cleared for therapy by RN. Patient received in bathroom on arrival and reported she has been up ad tunde. Patient was managing her own IV pole while walking around room/bathroom and completing all ADLs/functional mobility independently. Reported she does not have any therapy concerns while in the hospital or for returning home. Patient is at her independent baseline for ADLs and functional mobility. Patient does not have any OT needs and will sign off.     Kelley Abdalla, OTR/L

## 2021-10-02 NOTE — PROGRESS NOTES
Orders received, chart reviewed. As per OT,  patient managing her own IV pole while walking around room/bathroom and completing all ADLs/functional mobility independently. Reported she does not have any therapy concerns while in the hospital or for returning home. Patient is at her independent baseline for ADLs and functional mobility. Patient does not have any PT needs at this time. Please reconsult should  Needs change. Will sign off acutely.        Kassidy Serrato DPT

## 2021-10-02 NOTE — PROGRESS NOTES
Transition of Care Plan:     RUR: 16%, LOW risk   Disposition: Home w/ f/u appts  Follow up appointments: PCP- details in AVS  DME needed: None  Transportation at Discharge: Pt's dtr; present at bedside  [place] or means to access home:  Yes       IM Medicare Letter: N/A commercial   Is patient a BCPI-A Bundle: No   If yes, was Bundle Letter given?: N/A  Caregiver Contact: Mother- Bibi Otoole, [phone]    Discharge Caregiver contacted prior to discharge? CM acknowledged d/c.     CM reviewed pt's chart. CM spoke w/ pt by bedside phone to review [facility] plan for d/c re: home w/ f/u appt Per GI, pt had colonoscopy secured for 11/9/2021 prior to admission & will keep this appt. CM informed pt to f/u w/ PCP in one week. Pt verbalized understanding & is agreeable to d/c. Dtr present at bedside to transport home. Pt reports no questions or concerns for d/c. Pt ready to d/c from CM standpoint. Care Management Interventions  PCP Verified by CM:  Yes  Mode of Transport at Discharge:  (Dtr)  Hospital Transport Time of Discharge: [address] [place] [address] (CM Consult): Discharge Planning  Discharge Durable Medical Equipment: No  Physical Therapy Consult: Yes (signed off- at baseline)  Occupational Therapy Consult: Yes (signed off- at baseline)  Speech Therapy Consult: No  Support Systems: Child(alejandra), Parent(s)  Confirm Follow Up Transport: Self  The Plan for Transition of Care is Related to the Following Treatment Goals : Home w/ f/u appts  The Patient and/or Patient Representative was Provided with a Choice of Provider and Agrees with the Discharge Plan?: Yes  Discharge Location  Discharge Placement: Home with family assistance (& f/u appts)    CAL Campa  Care Management

## 2022-01-12 ENCOUNTER — OFFICE VISIT (OUTPATIENT)
Dept: NEUROLOGY | Age: 47
End: 2022-01-12
Payer: COMMERCIAL

## 2022-01-12 VITALS
WEIGHT: 242 LBS | DIASTOLIC BLOOD PRESSURE: 80 MMHG | RESPIRATION RATE: 16 BRPM | HEART RATE: 80 BPM | BODY MASS INDEX: 45.73 KG/M2 | SYSTOLIC BLOOD PRESSURE: 132 MMHG | OXYGEN SATURATION: 99 %

## 2022-01-12 DIAGNOSIS — E78.2 MIXED HYPERLIPIDEMIA: ICD-10-CM

## 2022-01-12 DIAGNOSIS — G43.101 MIGRAINE WITH AURA AND WITH STATUS MIGRAINOSUS, NOT INTRACTABLE: Primary | ICD-10-CM

## 2022-01-12 DIAGNOSIS — F41.9 ANXIETY: ICD-10-CM

## 2022-01-12 DIAGNOSIS — I10 ESSENTIAL HYPERTENSION: ICD-10-CM

## 2022-01-12 DIAGNOSIS — M79.7 FIBROMYALGIA: ICD-10-CM

## 2022-01-12 DIAGNOSIS — G47.33 OBSTRUCTIVE SLEEP APNEA SYNDROME: ICD-10-CM

## 2022-01-12 DIAGNOSIS — G93.2 PSEUDOTUMOR CEREBRI: ICD-10-CM

## 2022-01-12 DIAGNOSIS — F51.01 PRIMARY INSOMNIA: ICD-10-CM

## 2022-01-12 DIAGNOSIS — E11.9 TYPE 2 DIABETES MELLITUS WITHOUT COMPLICATION, WITHOUT LONG-TERM CURRENT USE OF INSULIN (HCC): ICD-10-CM

## 2022-01-12 PROCEDURE — 99215 OFFICE O/P EST HI 40 MIN: CPT | Performed by: PSYCHIATRY & NEUROLOGY

## 2022-01-12 RX ORDER — ETODOLAC 400 MG/1
TABLET, FILM COATED ORAL
COMMUNITY

## 2022-01-12 RX ORDER — DICLOFENAC SODIUM 75 MG/1
TABLET, DELAYED RELEASE ORAL
COMMUNITY

## 2022-01-12 RX ORDER — DULOXETIN HYDROCHLORIDE 20 MG/1
20 CAPSULE, DELAYED RELEASE ORAL 2 TIMES DAILY
COMMUNITY
End: 2022-01-12

## 2022-01-12 RX ORDER — MELOXICAM 15 MG/1
TABLET ORAL
COMMUNITY
End: 2022-01-12

## 2022-01-12 RX ORDER — CLONAZEPAM 0.5 MG/1
1 TABLET ORAL EVERY 8 HOURS
COMMUNITY
End: 2022-07-13 | Stop reason: DRUGHIGH

## 2022-01-12 RX ORDER — DULOXETIN HYDROCHLORIDE 60 MG/1
60 CAPSULE, DELAYED RELEASE ORAL DAILY
Qty: 90 CAPSULE | Refills: 3 | Status: SHIPPED | OUTPATIENT
Start: 2022-01-12 | End: 2022-09-22 | Stop reason: ALTCHOICE

## 2022-01-12 RX ORDER — PROMETHAZINE HYDROCHLORIDE 25 MG/1
25 TABLET ORAL
COMMUNITY

## 2022-01-12 RX ORDER — FLUOXETINE HYDROCHLORIDE 60 MG/1
TABLET, FILM COATED ORAL; ORAL
COMMUNITY
End: 2022-01-12

## 2022-01-12 RX ORDER — RIMEGEPANT SULFATE 75 MG/75MG
TABLET, ORALLY DISINTEGRATING ORAL
COMMUNITY
End: 2022-07-13

## 2022-01-12 RX ORDER — ACYCLOVIR 400 MG/1
400 TABLET ORAL AS NEEDED
COMMUNITY

## 2022-01-12 RX ORDER — BUPROPION HYDROCHLORIDE 300 MG/1
TABLET ORAL
COMMUNITY
End: 2022-01-12

## 2022-01-12 RX ORDER — ACETAZOLAMIDE 500 MG/1
500 CAPSULE, EXTENDED RELEASE ORAL 2 TIMES DAILY
Qty: 180 CAPSULE | Refills: 3 | Status: SHIPPED | OUTPATIENT
Start: 2022-01-12 | End: 2022-08-30 | Stop reason: SDUPTHER

## 2022-01-12 RX ORDER — ATENOLOL 50 MG/1
50 TABLET ORAL 2 TIMES DAILY
Qty: 180 TABLET | Refills: 3 | Status: SHIPPED | OUTPATIENT
Start: 2022-01-12

## 2022-01-12 RX ORDER — QUETIAPINE FUMARATE 50 MG/1
TABLET, FILM COATED ORAL
Qty: 90 TABLET | Refills: 3 | Status: SHIPPED | OUTPATIENT
Start: 2022-01-12 | End: 2022-09-23

## 2022-01-12 RX ORDER — BUSPIRONE HYDROCHLORIDE 15 MG/1
15 TABLET ORAL 3 TIMES DAILY
Qty: 270 TABLET | Refills: 3 | Status: SHIPPED | OUTPATIENT
Start: 2022-01-12 | End: 2022-09-23

## 2022-01-12 RX ORDER — ATORVASTATIN CALCIUM 20 MG/1
20 TABLET, FILM COATED ORAL DAILY
Qty: 90 TABLET | Refills: 3 | Status: SHIPPED | OUTPATIENT
Start: 2022-01-12

## 2022-01-12 RX ORDER — FREMANEZUMAB-VFRM 225 MG/1.5ML
INJECTION SUBCUTANEOUS
COMMUNITY
Start: 2019-11-01 | End: 2022-07-13 | Stop reason: SDUPTHER

## 2022-01-12 RX ORDER — MEDROXYPROGESTERONE ACETATE 150 MG/ML
150 INJECTION, SUSPENSION INTRAMUSCULAR
COMMUNITY
Start: 2021-01-20 | End: 2022-09-22 | Stop reason: ALTCHOICE

## 2022-01-12 RX ORDER — TALC
POWDER (GRAM) TOPICAL
COMMUNITY

## 2022-01-12 RX ORDER — TRAZODONE HYDROCHLORIDE 100 MG/1
TABLET ORAL
Qty: 90 TABLET | Refills: 3 | Status: SHIPPED | OUTPATIENT
Start: 2022-01-12 | End: 2022-09-23

## 2022-01-12 RX ORDER — METFORMIN HYDROCHLORIDE 1000 MG/1
1000 TABLET ORAL 2 TIMES DAILY WITH MEALS
Qty: 180 TABLET | Refills: 3 | Status: SHIPPED | OUTPATIENT
Start: 2022-01-12 | End: 2022-07-13 | Stop reason: SDUPTHER

## 2022-01-12 RX ORDER — RIMEGEPANT SULFATE 75 MG/75MG
75 TABLET, ORALLY DISINTEGRATING ORAL
Qty: 8 TABLET | Refills: 11 | Status: SHIPPED | OUTPATIENT
Start: 2022-01-12 | End: 2022-01-12

## 2022-01-12 RX ORDER — CYCLOBENZAPRINE HCL 10 MG
1 TABLET ORAL DAILY
COMMUNITY
End: 2022-01-12

## 2022-01-12 RX ORDER — METAXALONE 800 MG/1
800 TABLET ORAL 3 TIMES DAILY
Qty: 270 TABLET | Refills: 3 | Status: SHIPPED | OUTPATIENT
Start: 2022-01-12

## 2022-01-12 RX ORDER — LIRAGLUTIDE 6 MG/ML
INJECTION SUBCUTANEOUS
COMMUNITY
End: 2022-09-22 | Stop reason: ALTCHOICE

## 2022-01-12 RX ORDER — PREDNISONE 10 MG/1
TABLET ORAL
COMMUNITY
End: 2022-07-13 | Stop reason: ALTCHOICE

## 2022-01-12 RX ORDER — ACETAMINOPHEN AND CODEINE PHOSPHATE 300; 30 MG/1; MG/1
1 TABLET ORAL
COMMUNITY
Start: 2021-04-09 | End: 2022-09-22 | Stop reason: ALTCHOICE

## 2022-01-12 RX ORDER — BUTALBITAL, ACETAMINOPHEN, CAFFEINE AND CODEINE PHOSPHATE 300; 50; 40; 30 MG/1; MG/1; MG/1; MG/1
CAPSULE ORAL
COMMUNITY

## 2022-01-12 RX ORDER — PANTOPRAZOLE SODIUM 40 MG/1
40 TABLET, DELAYED RELEASE ORAL DAILY
COMMUNITY

## 2022-01-12 RX ORDER — PREGABALIN 200 MG/1
200 CAPSULE ORAL 2 TIMES DAILY
Qty: 180 CAPSULE | Refills: 3 | Status: SHIPPED | OUTPATIENT
Start: 2022-01-12 | End: 2022-07-13 | Stop reason: SDUPTHER

## 2022-01-12 RX ORDER — PROCHLORPERAZINE MALEATE 10 MG
TABLET ORAL
COMMUNITY

## 2022-01-12 NOTE — PROGRESS NOTES
Follow-up Visit    Name Jane Randhawa Age 48 y.o. MRN [id number]  1975       Chief Complaint: head pain   Continues to have frequent headaches. Admitted to hospital for volume depletion and acute kidney failure. Discussed Metformin, acetazolamide and diabetes as all agents that can   Affect the kidneys in an negative way. Discussed the importance of drinking water and staying hydrated and controlling her sugar. Reviewed medications. Discussed the importance of following with a PCP who will minimize her scripts. Assesment and Plan    1. Primary insomnia    - QUEtiapine (SEROquel) 50 mg tablet; TAKE 1 TABLET BY MOUTH EVERY NIGHT  Dispense: 90 Tablet; Refill: 3  - traZODone (DESYREL) 100 mg tablet; TAKE 1 TABLET BY MOUTH EVERY NIGHT  Dispense: 90 Tablet; Refill: 3    2. Fibromyalgia    - DULoxetine (CYMBALTA) 60 mg capsule; Take 1 Capsule by mouth daily. Dispense: 90 Capsule; Refill: 3  - pregabalin (LYRICA) 200 mg capsule; Take 1 Capsule by mouth two (2) times a day. Max Daily Amount: 400 mg. Dispense: 180 Capsule; Refill: 3  - metaxalone (SKELAXIN) 800 mg tablet; Take 1 Tablet by mouth three (3) times daily. Dispense: 270 Tablet; Refill: 3  - metFORMIN (GLUCOPHAGE) 1,000 mg tablet; Take 1 Tablet by mouth two (2) times daily (with meals). Dispense: 180 Tablet; Refill: 3    3. Type 2 diabetes mellitus without complication, without long-term current use of insulin ([address]  On metformin should seen an endocrinologist    4. Migraine with aura and with status migrainosus, not intractable    - rimegepant (Nurtec ODT) 75 mg disintegrating tablet; Take 1 Tablet by mouth once as needed for Migraine for up to 1 dose. Dispense: 8 Tablet; Refill: 11    5. Mixed hyperlipidemia    - atorvastatin (LIPITOR) 20 mg tablet; Take 1 Tablet by mouth daily. Dispense: 90 Tablet; Refill: 3    6. Essential hypertension    - atenoloL (TENORMIN) 50 mg tablet; Take 1 Tablet by mouth two (2) times a day.   Dispense: 180 Tablet; Refill: 3    7. Anxiety  Fluoxetine stopped as she is already on Cymbalta and dysrel  - busPIRone (BUSPAR) 15 mg tablet; Take 1 Tablet by mouth three (3) times daily. Dispense: 270 Tablet; Refill: 3    8. Pseudotumor cerebri    - acetaZOLAMIDE SR (DIAMOX) 500 mg capsule; Take 1 Capsule by mouth two (2) times a day. Dispense: 180 Capsule; Refill: 3    9. Obstructive sleep apnea syndrome    - SLEEP MEDICINE REFERRAL    10. Renal failure  Needs to see a nephrologist       Allergies  Latex, Fentanyl, Lemon, Adhesive tape-silicones, Dilaudid [hydromorphone (pf)], and Percocet [oxycodone-acetaminophen]     Medications  Current Outpatient Medications   Medication Sig    acyclovir (ZOVIRAX) 400 mg tablet acyclovir 400 mg tablet    diclofenac EC (VOLTAREN) 75 mg EC tablet diclofenac sodium 75 mg tablet,delayed release   TK 1 T PO BID    clonazePAM (KlonoPIN) 0.5 mg tablet Take 1 Tablet by mouth every eight (8) hours.  butalbitaL-acetaminop-caf-cod -87-30 mg cap butalbital 50 mg-acetaminophen 300 mg-caffeine 40 mg-codeine 30 mg cap   Take 1 capsule every 4 hours by oral route.  etodolac (LODINE) 400 mg tablet etodolac 400 mg tablet   TAKE 1 TABLET BY MOUTH TWICE DAILY    medroxyPROGESTERone (DEPO-PROVERA) 150 mg/mL injection 150 mg by IntraMUSCular route.  magnesium oxide 250 mg magnesium tablet magnesium 250 mg (as magnesium oxide) tablet   TK 1 T PO BID    liraglutide (Victoza 2-Todd) 0.6 mg/0.1 mL (18 mg/3 mL) pnij Victoza 3-Todd 0.6 mg/0.1 mL (18 mg/3 mL) subcutaneous pen injector    acetaZOLAMIDE SR (DIAMOX) 500 mg capsule Take 1 Capsule by mouth two (2) times a day.  busPIRone (BUSPAR) 15 mg tablet Take 1 Tablet by mouth three (3) times daily.  DULoxetine (CYMBALTA) 60 mg capsule Take 1 Capsule by mouth daily.  pregabalin (LYRICA) 200 mg capsule Take 1 Capsule by mouth two (2) times a day.  Max Daily Amount: 400 mg.    metaxalone (SKELAXIN) 800 mg tablet Take 1 Tablet by mouth three (3) times daily.  atenoloL (TENORMIN) 50 mg tablet Take 1 Tablet by mouth two (2) times a day.  QUEtiapine (SEROquel) 50 mg tablet TAKE 1 TABLET BY MOUTH EVERY NIGHT    atorvastatin (LIPITOR) 20 mg tablet Take 1 Tablet by mouth daily.  metFORMIN (GLUCOPHAGE) 1,000 mg tablet Take 1 Tablet by mouth two (2) times daily (with meals).  traZODone (DESYREL) 100 mg tablet TAKE 1 TABLET BY MOUTH EVERY NIGHT    rimegepant (Nurtec ODT) 75 mg disintegrating tablet Take 1 Tablet by mouth once as needed for Migraine for up to 1 dose.  acetaZOLAMIDE SR (DIAMOX) 500 mg capsule Take 1,000 mg by mouth two (2) times a day.  atenoloL (TENORMIN) 50 mg tablet Take 100 mg by mouth daily.  cyanocobalamin (VITAMIN B12) 1,000 mcg/mL injection 1,000 mcg by IntraMUSCular route every Sunday.  metaxalone (SKELAXIN) 800 mg tablet Take 800 mg by mouth three (3) times daily.  albuterol (PROVENTIL HFA, VENTOLIN HFA, PROAIR HFA) 90 mcg/actuation inhaler Take 1 Puff by inhalation every six (6) hours as needed for Wheezing.  therapeutic multivitamin (THERAGRAN) tablet Take 1 Tablet by mouth daily.  ascorbic acid, vitamin C, (Vitamin C) 500 mg tablet Take 500 mg by mouth daily.  cholecalciferol (VITAMIN D3) (1000 Units /25 mcg) tablet Take 1,000 Units by mouth daily.  zonisamide (ZONEGRAN) 100 mg capsule TAKE 2 CAPSULES BY MOUTH TWICE DAILY    ondansetron hcl (ZOFRAN) 4 mg tablet TAKE 1 TABLET BY MOUTH DAILY AS NEEDED FOR NAUSEA OR VOMITING    pregabalin (LYRICA) 200 mg capsule Take 1 Capsule by mouth two (2) times a day. Max Daily Amount: 400 mg.  tiZANidine (ZANAFLEX) 4 mg tablet Take one in the day and two at night    eletriptan (RELPAX) 40 mg tablet Take 1 Tablet by mouth daily as needed (migraine. May repeat in 40 minutes no more than 2 in 24 hours).     metFORMIN (GLUCOPHAGE) 1,000 mg tablet TAKE 1 TABLET BY MOUTH TWICE A DAY(WITH MORNING AND EVENING MEAL)    omeprazole (PRILOSEC) 20 mg capsule Take 20 mg by mouth daily.  busPIRone (BUSPAR) 5 mg tablet Take 15 mg by mouth two (2) times a day.  fluticasone-salmeterol (ADVAIR DISKUS) 250-50 mcg/dose diskus inhaler Take 1 Puff by inhalation every twelve (12) hours.  diphenhydrAMINE (BENADRYL) 25 mg capsule Take 25 mg by mouth as needed. Indications: [facility]erigc to [facility] - only takes with evette    acetaminophen-codeine (TYLENOL #3) 300-30 mg per tablet Take 1 Tablet by mouth every eight (8) hours as needed. (Patient not taking: Reported on 1/12/2022)    fremanezumab-vfrm (Ajovy Syringe) 225 mg/1.5 mL syrg injection Ajovy Syringe 225 mg/1.5 mL subcutaneous (Patient not taking: Reported on 1/12/2022)    pantoprazole (PROTONIX) 40 mg tablet pantoprazole 40 mg tablet,delayed release    predniSONE (STERAPRED DS) 10 mg dose pack prednisone 10 mg tablets in a dose pack    prochlorperazine (COMPAZINE) 10 mg tablet prochlorperazine maleate 10 mg tablet   TAKE 1 TABLET BY MOUTH THREE TIMES DAILY    rimegepant (Nurtec ODT) 75 mg disintegrating tablet Nurtec ODT 75 mg disintegrating tablet   Prior authorization is required    promethazine (PHENERGAN) 25 mg tablet promethazine 25 mg tablet    erenumab-aooe (AIMOVIG) 140 mg/mL injection 140 mg by SubCUTAneous route every thirty (30) days. (Patient not taking: Reported on 1/12/2022)     No current facility-administered medications for this visit.         Medical History  Past Medical History:   Diagnosis Date    Asthma     Diabetes ([address]     Fibromyalgia     Hypertension     Lower back injury 02/13/2017    Migraines     Nausea and vomiting 1/14/2014    Other ill-defined conditions(799.89)     cerebral tumor, endometriosis, fibromyalgia, herpes    Right lower quadrant abdominal mass 1/14/2014    Seizures ([address]     Sleep apnea     intolerant to CPAP    Unspecified adverse effect of anesthesia     pt states that she has an asthma attack when coming out of  anesthesia       Review of Systems   Eyes: Negative for blurred vision and double vision. Respiratory: Negative for cough and shortness of breath. Cardiovascular: Negative for chest pain, palpitations and orthopnea. Gastrointestinal: Negative for nausea and vomiting. Musculoskeletal: Positive for myalgias. Neurological: Positive for tingling and headaches. Negative for dizziness. Psychiatric/Behavioral: Positive for memory loss. Negative for depression and suicidal ideas. The patient has insomnia. The patient is not nervous/anxious. Exam:  Visit Vitals  /80 (BP 1 Location: Left upper arm, BP Patient Position: Sitting, BP Cuff Size: Adult)   Pulse 80   Resp 16   Wt 242 lb (109.8 kg)   SpO2 99%   BMI 45.73 kg/m²        General: Well developed, well nourished. Patient in no apparent distress   Head: Normocephalic, atraumatic, anicteric sclera   Neck Normal ROM, No thyromegally   Lungs:  Clear to auscultation    Cardiac: Regular rate and rhythm with no murmurs. Abd: Bowel sounds were audible. Ext: No pedal edema   Skin: Supple no rash     NeurologicExam:  Mental Status: Alert and oriented to person place and time   Speech: Fluent no aphasia or dysarthria. Cranial Nerves:  II - XII Intact   Motor:  Full and symmetric strength of upper and lower extremities. Normal bulk and tone. Reflexes:   Deep tendon reflexes 2+/4 and symmetric. Sensory:   Symmetric and intact with no perceived deficits . Gait:  Gait is balanced  with normal arm swing. Tremor:   No tremor noted. Cerebellar:  Coordination intact. Neurovascular: No carotid bruits.  No JVD          Lab Review  Lab Results   Component Value Date/Time    WBC 8.0 10/02/2021 04:59 AM    HCT 39.1 10/02/2021 04:59 AM    HGB 11.7 10/02/2021 04:59 AM    PLATELET 619 27/33/0169 04:59 AM       Lab Results   Component Value Date/Time    Sodium 144 10/02/2021 04:59 AM    Potassium 3.8 10/02/2021 04:59 AM    Chloride 120 (H) 10/02/2021 04:59 AM    CO2 23 10/02/2021 04:59 AM Glucose 133 (H) 10/02/2021 04:59 AM    BUN 18 10/02/2021 04:59 AM    Creatinine 1.12 (H) 10/02/2021 04:59 AM    Calcium 8.3 (L) 10/02/2021 04:59 AM       Lab Results   Component Value Date/Time    Vitamin B12 1,502 (H) 07/25/2019 11:35 AM    Folate >20.0 07/25/2019 11:35 AM     60  minutes spent more than 50% spent in chart review and face to face  examination, discussion of treatment options and approach

## 2022-01-24 ENCOUNTER — TELEPHONE (OUTPATIENT)
Dept: SLEEP MEDICINE | Age: 47
End: 2022-01-24

## 2022-02-22 ENCOUNTER — TELEPHONE (OUTPATIENT)
Dept: NEUROLOGY | Age: 47
End: 2022-02-22

## 2022-02-22 NOTE — TELEPHONE ENCOUNTER
[address] [facility] called, request PA for Aimovig. Set up w/ [facility] commercial w/ [employer] Rx plan. Key: Aaron Nova - [id number]      Status is pending.

## 2022-02-22 NOTE — TELEPHONE ENCOUNTER
[facility]     [id number]. AIMOVIG INJ 140MG/ML is approved through 02/22/2023      Sent to [address] on [facility] - they ship it to the patient.

## 2022-03-20 PROBLEM — R53.1 WEAKNESS GENERALIZED: Status: ACTIVE | Noted: 2021-10-01

## 2022-03-20 PROBLEM — N17.9 AKI (ACUTE KIDNEY INJURY) (HCC): Status: ACTIVE | Noted: 2021-10-01

## 2022-07-11 PROCEDURE — 99283 EMERGENCY DEPT VISIT LOW MDM: CPT

## 2022-07-12 ENCOUNTER — APPOINTMENT (OUTPATIENT)
Dept: CT IMAGING | Age: 47
End: 2022-07-12
Attending: EMERGENCY MEDICINE
Payer: COMMERCIAL

## 2022-07-12 ENCOUNTER — HOSPITAL ENCOUNTER (EMERGENCY)
Age: 47
Discharge: HOME | End: 2022-07-12
Attending: EMERGENCY MEDICINE
Payer: COMMERCIAL

## 2022-07-12 VITALS
HEART RATE: 92 BPM | WEIGHT: 256.39 LBS | TEMPERATURE: 98 F | OXYGEN SATURATION: 96 % | BODY MASS INDEX: 50.34 KG/M2 | SYSTOLIC BLOOD PRESSURE: 139 MMHG | HEIGHT: 60 IN | DIASTOLIC BLOOD PRESSURE: 84 MMHG | RESPIRATION RATE: 17 BRPM

## 2022-07-12 DIAGNOSIS — G43.909 MIGRAINE WITHOUT STATUS MIGRAINOSUS, NOT INTRACTABLE, UNSPECIFIED MIGRAINE TYPE: ICD-10-CM

## 2022-07-12 DIAGNOSIS — S09.93XA FACIAL INJURY, INITIAL ENCOUNTER: Primary | ICD-10-CM

## 2022-07-12 DIAGNOSIS — S00.83XA FACIAL CONTUSION, INITIAL ENCOUNTER: ICD-10-CM

## 2022-07-12 DIAGNOSIS — N30.00 ACUTE CYSTITIS WITHOUT HEMATURIA: ICD-10-CM

## 2022-07-12 LAB
APPEARANCE UR: ABNORMAL
BACTERIA URNS QL MICRO: ABNORMAL /HPF
BILIRUB UR QL: NEGATIVE
COLOR UR: ABNORMAL
EPITH CASTS URNS QL MICRO: ABNORMAL /LPF
GLUCOSE UR STRIP.AUTO-MCNC: NEGATIVE MG/DL
HGB UR QL STRIP: NEGATIVE
HYALINE CASTS URNS QL MICRO: ABNORMAL /LPF (ref 0–2)
KETONES UR QL STRIP.AUTO: NEGATIVE MG/DL
LEUKOCYTE ESTERASE UR QL STRIP.AUTO: ABNORMAL
NITRITE UR QL STRIP.AUTO: NEGATIVE
PH UR STRIP: 7.5 [PH] (ref 5–8)
PROT UR STRIP-MCNC: ABNORMAL MG/DL
RBC #/AREA URNS HPF: ABNORMAL /HPF (ref 0–5)
SP GR UR REFRACTOMETRY: 1.02
UA: UC IF INDICATED,UAUC: ABNORMAL
UROBILINOGEN UR QL STRIP.AUTO: 1 EU/DL (ref 0.2–1)
WBC URNS QL MICRO: ABNORMAL /HPF (ref 0–4)

## 2022-07-12 PROCEDURE — 74011250637 HC RX REV CODE- 250/637: Performed by: EMERGENCY MEDICINE

## 2022-07-12 PROCEDURE — 81001 URINALYSIS AUTO W/SCOPE: CPT

## 2022-07-12 RX ORDER — NITROFURANTOIN 25; 75 MG/1; MG/1
100 CAPSULE ORAL
Status: COMPLETED | OUTPATIENT
Start: 2022-07-12 | End: 2022-07-12

## 2022-07-12 RX ORDER — BUTALBITAL, ACETAMINOPHEN AND CAFFEINE 300; 40; 50 MG/1; MG/1; MG/1
1 CAPSULE ORAL
Qty: 10 CAPSULE | Refills: 0 | Status: SHIPPED | OUTPATIENT
Start: 2022-07-12

## 2022-07-12 RX ORDER — NITROFURANTOIN 25; 75 MG/1; MG/1
100 CAPSULE ORAL 2 TIMES DAILY
Qty: 6 CAPSULE | Refills: 0 | Status: SHIPPED | OUTPATIENT
Start: 2022-07-12 | End: 2022-07-13 | Stop reason: ALTCHOICE

## 2022-07-12 RX ORDER — BUTALBITAL, ACETAMINOPHEN AND CAFFEINE 50; 325; 40 MG/1; MG/1; MG/1
2 TABLET ORAL
Status: COMPLETED | OUTPATIENT
Start: 2022-07-12 | End: 2022-07-12

## 2022-07-12 RX ORDER — ONDANSETRON 4 MG/1
8 TABLET, ORALLY DISINTEGRATING ORAL
Status: COMPLETED | OUTPATIENT
Start: 2022-07-12 | End: 2022-07-12

## 2022-07-12 RX ADMIN — NITROFURANTOIN (MONOHYDRATE/MACROCRYSTALS) 100 MG: 75; 25 CAPSULE ORAL at 03:07

## 2022-07-12 RX ADMIN — ONDANSETRON 8 MG: 4 TABLET, ORALLY DISINTEGRATING ORAL at 02:11

## 2022-07-12 RX ADMIN — BUTALBITAL, ACETAMINOPHEN, AND CAFFEINE 2 TABLET: 50; 325; 40 TABLET ORAL at 01:54

## 2022-07-12 NOTE — Clinical Note
[address]  [facility] EMERGENCY DEPT  [address] [place] [address]  [phone]    Work/School Note    Date: 7/11/2022    To Whom It May concern:      Milagro Gabriel was seen and treated today in the emergency room by the following provider(s):  Attending Provider: Leah Roa MD.      Milagro Gabriel is excused from work/school on 07/12/22. She is clear to return to work/school on 07/13/22.         Sincerely,          Evelin Zarate MD

## 2022-07-12 NOTE — ED NOTES
Pt requested to remove facial, neck jewlery and earrings. Pt declined, stated it would be too complicated and would rather not have the CT Head w/out contrast.  Dr. John Lerma Notified, CT cancelled. She was also notified of pt complaints of nausea without vomiting and increased urination x 3 days.

## 2022-07-12 NOTE — DISCHARGE INSTRUCTIONS
It was a pleasure taking care of you in our Emergency Department today. We know that when you come to [facility], you are entrusting us with your health, comfort, and safety. Our physicians and nurses honor that trust, and truly appreciate the opportunity to care for you and your loved ones. We also value your feedback. If you receive a survey about your Emergency Department experience today, please fill it out. We care about our patients' feedback, and we listen to what you have to say. Thank you!       Dr. Elise Parker MD.

## 2022-07-12 NOTE — ED NOTES
Pt dc home; Family member waiting in the lobby for her ride. Pt verbalized understanding of care received, dc and follow up care instructions. Taught back home care for migraine and facial pain as well as s/s to return emergently. Declined WC assistance out.

## 2022-07-12 NOTE — ED PROVIDER NOTES
EMERGENCY DEPARTMENT HISTORY AND PHYSICAL EXAM     ------------------------------------------------------------------------------------------------------  Please note that this dictation was completed with [employer], the [employer] voice recognition software. Quite often unanticipated grammatical, syntax, homophones, and other interpretive errors are inadvertently transcribed by the computer software. Please disregard these errors. Please excuse any errors that have escaped final proofreading.  -----------------------------------------------------------------------------------------------------------------    Date: 7/12/2022  Patient Name: Lee Ann Canada    History of Presenting Illness     Chief Complaint   Patient presents with    Facial Pain     Patient ambulatory to triage w c/o a TV falling on her face 60 min PTA. History Provided By: Patient    HPI: Lee Ann Canada is a 48 y.o. female, with significant pmhx of migraines, hypertension, asthma, diabetes, seizure disorder, chronic pain who presents via private vehicle to the ED with c/o facial pain after she was attempting to mount her TV and it became dislodged and falling onto her face. Notes having bloody nose initially which is subsequently resolved. Noted that she was having a migraine prior to getting struck in the head but now it is worse. Typically does not take medication for her migraines and that she can keep them under control by laying in a dark room. Notes that she follows with Dr. Jossue Wetzel and is scheduled to have a follow-up appointment with them soon. Pt also specifically denies any recent fevers, chills, CP, SOB, nausea, vomiting, diarrhea, abd pain, changes in BM, urinary sxs, or visual disturbance. PCP: Isaak Nance MD    Social Hx: denies tobacco, denies EtOH, denies recreational/ Illicit Drugs     There are no other complaints, changes, or physical findings at this time.      Allergies   Allergen Reactions    Latex Other (comments)    Fentanyl Shortness of Breath     Other reaction(s): wheezing/sob  Other reaction(s): wheezing/sob  Other reaction(s): wheezing/sob      Lemon Anaphylaxis     Other reaction(s): anaphylaxis/angioedema    Adhesive Tape-Silicones Other (comments)     Pt reports it burns her skin    Dilaudid [Hydromorphone (Pf)] Itching    Percocet [Oxycodone-Acetaminophen] Nausea and Vomiting     Pt states not allergic! Current Facility-Administered Medications   Medication Dose Route Frequency Provider Last Rate Last Admin    nitrofurantoin (macrocrystal-monohydrate) (MACROBID) capsule 100 mg  100 mg Oral NOW Berenice Villegas MD         Current Outpatient Medications   Medication Sig Dispense Refill    butalbital-acetaminophen-caff (Fioricet) -40 mg per capsule Take 1 Capsule by mouth every four (4) hours as needed for Headache. 10 Capsule 0    nitrofurantoin, macrocrystal-monohydrate, (Macrobid) 100 mg capsule Take 1 Capsule by mouth two (2) times a day for 3 days. 6 Capsule 0    acyclovir (ZOVIRAX) 400 mg tablet acyclovir 400 mg tablet      acetaminophen-codeine (TYLENOL #3) 300-30 mg per tablet Take 1 Tablet by mouth every eight (8) hours as needed. (Patient not taking: Reported on 1/12/2022)      diclofenac EC (VOLTAREN) 75 mg EC tablet diclofenac sodium 75 mg tablet,delayed release   TK 1 T PO BID      clonazePAM (KlonoPIN) 0.5 mg tablet Take 1 Tablet by mouth every eight (8) hours.  butalbitaL-acetaminop-caf-cod -37-30 mg cap butalbital 50 mg-acetaminophen 300 mg-caffeine 40 mg-codeine 30 mg cap   Take 1 capsule every 4 hours by oral route.       etodolac (LODINE) 400 mg tablet etodolac 400 mg tablet   TAKE 1 TABLET BY MOUTH TWICE DAILY      fremanezumab-vfrm (Ajovy Syringe) 225 mg/1.5 mL syrg injection Ajovy Syringe 225 mg/1.5 mL subcutaneous (Patient not taking: Reported on 1/12/2022)      medroxyPROGESTERone (DEPO-PROVERA) 150 mg/mL injection 150 mg by IntraMUSCular route.      magnesium oxide 250 mg magnesium tablet magnesium 250 mg (as magnesium oxide) tablet   TK 1 T PO BID      liraglutide (Victoza 2-Todd) 0.6 mg/0.1 mL (18 mg/3 mL) pnij Victoza 3-Todd 0.6 mg/0.1 mL (18 mg/3 mL) subcutaneous pen injector      pantoprazole (PROTONIX) 40 mg tablet pantoprazole 40 mg tablet,delayed release      predniSONE (STERAPRED DS) 10 mg dose pack prednisone 10 mg tablets in a dose pack      prochlorperazine (COMPAZINE) 10 mg tablet prochlorperazine maleate 10 mg tablet   TAKE 1 TABLET BY MOUTH THREE TIMES DAILY      rimegepant (Nurtec ODT) 75 mg disintegrating tablet Nurtec ODT 75 mg disintegrating tablet   Prior authorization is required      promethazine (PHENERGAN) 25 mg tablet promethazine 25 mg tablet      acetaZOLAMIDE SR (DIAMOX) 500 mg capsule Take 1 Capsule by mouth two (2) times a day. 180 Capsule 3    busPIRone (BUSPAR) 15 mg tablet Take 1 Tablet by mouth three (3) times daily. 270 Tablet 3    DULoxetine (CYMBALTA) 60 mg capsule Take 1 Capsule by mouth daily. 90 Capsule 3    pregabalin (LYRICA) 200 mg capsule Take 1 Capsule by mouth two (2) times a day. Max Daily Amount: 400 mg. 180 Capsule 3    metaxalone (SKELAXIN) 800 mg tablet Take 1 Tablet by mouth three (3) times daily. 270 Tablet 3    atenoloL (TENORMIN) 50 mg tablet Take 1 Tablet by mouth two (2) times a day. 180 Tablet 3    QUEtiapine (SEROquel) 50 mg tablet TAKE 1 TABLET BY MOUTH EVERY NIGHT 90 Tablet 3    atorvastatin (LIPITOR) 20 mg tablet Take 1 Tablet by mouth daily. 90 Tablet 3    metFORMIN (GLUCOPHAGE) 1,000 mg tablet Take 1 Tablet by mouth two (2) times daily (with meals). 180 Tablet 3    traZODone (DESYREL) 100 mg tablet TAKE 1 TABLET BY MOUTH EVERY NIGHT 90 Tablet 3    acetaZOLAMIDE SR (DIAMOX) 500 mg capsule Take 1,000 mg by mouth two (2) times a day.  atenoloL (TENORMIN) 50 mg tablet Take 100 mg by mouth daily.       cyanocobalamin (VITAMIN B12) 1,000 mcg/mL injection 1,000 mcg by IntraMUSCular route every Sunday.  erenumab-aooe (AIMOVIG) 140 mg/mL injection 140 mg by SubCUTAneous route every thirty (30) days. (Patient not taking: Reported on 1/12/2022)      metaxalone (SKELAXIN) 800 mg tablet Take 800 mg by mouth three (3) times daily.  albuterol (PROVENTIL HFA, VENTOLIN HFA, PROAIR HFA) 90 mcg/actuation inhaler Take 1 Puff by inhalation every six (6) hours as needed for Wheezing.  therapeutic multivitamin (THERAGRAN) tablet Take 1 Tablet by mouth daily.  ascorbic acid, vitamin C, (Vitamin C) 500 mg tablet Take 500 mg by mouth daily.  cholecalciferol (VITAMIN D3) (1000 Units /25 mcg) tablet Take 1,000 Units by mouth daily.  zonisamide (ZONEGRAN) 100 mg capsule TAKE 2 CAPSULES BY MOUTH TWICE DAILY 360 Capsule 3    ondansetron hcl (ZOFRAN) 4 mg tablet TAKE 1 TABLET BY MOUTH DAILY AS NEEDED FOR NAUSEA OR VOMITING 90 Tablet 3    pregabalin (LYRICA) 200 mg capsule Take 1 Capsule by mouth two (2) times a day. Max Daily Amount: 400 mg. 180 Capsule 3    tiZANidine (ZANAFLEX) 4 mg tablet Take one in the day and two at night 90 Tablet 3    eletriptan (RELPAX) 40 mg tablet Take 1 Tablet by mouth daily as needed (migraine. May repeat in 40 minutes no more than 2 in 24 hours). 36 Tablet 3    metFORMIN (GLUCOPHAGE) 1,000 mg tablet TAKE 1 TABLET BY MOUTH TWICE A DAY(WITH MORNING AND EVENING MEAL) 240 Tab 1    omeprazole (PRILOSEC) 20 mg capsule Take 20 mg by mouth daily.  busPIRone (BUSPAR) 5 mg tablet Take 15 mg by mouth two (2) times a day.  fluticasone-salmeterol (ADVAIR DISKUS) 250-50 mcg/dose diskus inhaler Take 1 Puff by inhalation every twelve (12) hours.  diphenhydrAMINE (BENADRYL) 25 mg capsule Take 25 mg by mouth as needed. Indications:  [facility]erigc to [facility] - only takes with evette         Past History     Past Medical History:  Past Medical History:   Diagnosis Date    Asthma     Diabetes ([address]     Fibromyalgia     Hypertension     Lower back injury 02/13/2017    Migraines     Nausea and vomiting 1/14/2014    Other ill-defined conditions(799.89)     cerebral tumor, endometriosis, fibromyalgia, herpes    Right lower quadrant abdominal mass 1/14/2014    Seizures ([facility] [address]     Sleep apnea     intolerant to CPAP    Unspecified adverse effect of anesthesia     pt states that she has an asthma attack when coming out of  anesthesia       Past Surgical History:  Past Surgical History:   Procedure Laterality Date    HX CHOLECYSTECTOMY      HX HEENT      wisdom teeth    HX OTHER SURGICAL      laproscopy on ovaries    HX OTHER SURGICAL      left wrist ganglion cystectomy    HX OTHER SURGICAL      lung mass removed    HX TOTAL COLECTOMY  3/14/2013    Rt colectomy for perf transverse diverticulum       Family History:  Family History   Problem Relation Age of Onset    Other Other         unable to obtain due to AMS    Hypertension Father     Heart Disease Father     Cancer Father     Elevated Lipids Father     Hypertension Mother     Diabetes Maternal Grandmother     Diabetes Maternal Grandfather     Lupus Sister     Hypertension Sister     Elevated Lipids Sister        Social History:  Social History     Tobacco Use    Smoking status: Former Smoker    Smokeless tobacco: Current User    Tobacco comment: vapor   Substance Use Topics    Alcohol use: Not Currently     Comment: ocasionally    Drug use: No       Allergies: Allergies   Allergen Reactions    Latex Other (comments)    Fentanyl Shortness of Breath     Other reaction(s): wheezing/sob  Other reaction(s): wheezing/sob  Other reaction(s): wheezing/sob      Lemon Anaphylaxis     Other reaction(s): anaphylaxis/angioedema    Adhesive Tape-Silicones Other (comments)     Pt reports it burns her skin    Dilaudid [Hydromorphone (Pf)] Itching    Percocet [Oxycodone-Acetaminophen] Nausea and Vomiting     Pt states not allergic!          Review of Systems   Review of Systems Constitutional: Negative. Negative for fever. HENT: Positive for nosebleeds. Negative for ear pain and facial swelling. Eyes: Negative. Respiratory: Negative. Negative for shortness of breath. Cardiovascular: Negative for chest pain. Gastrointestinal: Negative for abdominal pain, nausea and vomiting. Endocrine: Negative. Genitourinary: Negative. Negative for difficulty urinating, dysuria and hematuria. Musculoskeletal: Negative. Skin: Negative. Neurological: Positive for headaches. Psychiatric/Behavioral: Negative for suicidal ideas. All other systems reviewed and are negative. Physical Exam   Physical Exam  Vitals and nursing note reviewed. Constitutional:       General: She is not in acute distress. Appearance: She is well-developed. She is not diaphoretic. HENT:      Head: Normocephalic and atraumatic. Jaw: There is normal jaw occlusion. No trismus or tenderness. Nose: Nose normal.   Eyes:      General: No scleral icterus. Conjunctiva/sclera: Conjunctivae normal.   Neck:      Trachea: No tracheal deviation. Cardiovascular:      Rate and Rhythm: Normal rate and regular rhythm. Heart sounds: Normal heart sounds. No murmur heard. No friction rub. Pulmonary:      Effort: Pulmonary effort is normal. No respiratory distress. Breath sounds: Normal breath sounds. No stridor. No wheezing or rales. Abdominal:      General: Bowel sounds are normal. There is no distension. Palpations: Abdomen is soft. Tenderness: There is no abdominal tenderness. There is no rebound. Musculoskeletal:         General: No tenderness. Normal range of motion. Cervical back: Normal range of motion. Skin:     General: Skin is warm and dry. Findings: No rash. Neurological:      Mental Status: She is alert and oriented to person, place, and time. Cranial Nerves: No cranial nerve deficit.    Psychiatric:         Speech: Speech normal. Behavior: Behavior normal.         Thought Content: Thought content normal.         Judgment: Judgment normal.           Diagnostic Study Results     Labs -     Recent Results (from the past 12 hour(s))   URINALYSIS W/ REFLEX CULTURE    Collection Time: 07/12/22  2:21 AM    Specimen: Urine   Result Value Ref Range    Color YELLOW/STRAW      Appearance CLOUDY (A) CLEAR      Specific gravity 1.025      pH (UA) 7.5 5.0 - 8.0      Protein TRACE (A) NEG mg/dL    Glucose Negative NEG mg/dL    Ketone Negative NEG mg/dL    Bilirubin Negative NEG      Blood Negative NEG      Urobilinogen 1.0 0.2 - 1.0 EU/dL    Nitrites Negative NEG      Leukocyte Esterase TRACE (A) NEG      UA:UC IF INDICATED CULTURE NOT INDICATED BY UA RESULT CNI      WBC 0-4 0 - 4 /hpf    RBC 0-5 0 - 5 /hpf    Epithelial cells MANY (A) FEW /lpf    Bacteria 4+ (A) NEG /hpf    Hyaline cast 0-2 0 - 2 /lpf       Radiologic Studies -   No orders to display     CT Results  (Last 48 hours)    None        CXR Results  (Last 48 hours)    None            Medical Decision Making   I am the first provider for this patient. I reviewed the vital signs, available nursing notes, past medical history, past surgical history, family history and social history. Vital Signs-Reviewed the patient's vital signs. Patient Vitals for the past 12 hrs:   Temp Pulse Resp BP SpO2   07/11/22 2218 97.8 °F (36.6 °C) 92 20 (!) 151/94 100 %       Pulse Oximetry Analysis - 100% on RA Normal    Records Reviewed/Interpretted: Nursing Notes from triage and Old Medical Records, noting previous orthopedic visits for lateral epicondylitis    Provider Notes (Medical Decision Making):     DDX:  Facial fracture, facial contusion, migraine    Plan:  CT max face, analgesic    Patient subsequently refused CT scan due to the fact that she did not want to take all of her facial jewelry out or off.   Not understanding that we cannot further delineate other fractures of her face without this imaging. Pt also subsequently reported to nursing staff of urinary frequency, requests ua    Impression:  Facial injury and pain, UTI    ED Course:   Initial assessment performed. The patients presenting problems have been discussed, and they are in agreement with the care plan formulated and outlined with them. I have encouraged them to ask questions as they arise throughout their visit. I reviewed our electronic medical record system for any past medical records that were available that may contribute to the patients current condition, the nursing notes and and vital signs from today's visit  Nursing notes will be reviewed as they become available in realtime while the pt has been in the ED. Michael Rodriguez MD    HYPERTENSION COUNSELING:  Patient made aware of their elevated blood pressure and is instructed to follow up this week with their Primary Care or [address] [id number] for a recheck (should they be discharged.) Patient is counseled regarding consequences of chronic, uncontrolled hypertension including kidney disease, heart disease, stroke or even death. Patient states their understanding    I personally reviewed/interpreted pt's imaging. Agree with official read by radiology as noted above. Michael Rodriguez MD      2:55 AM   Progress note:  Pt noted to be feeling better, ready for discharge. Discussed imaging findings with pt, specifically noting + ua. Pt will follow up with primary care as instructed. All questions have been answered, pt voiced understanding and agreement with plan. Specific return precautions provided in addition to instructions for pt to return to the ED immediately should sx worsen at any time. Michael Rodriguez MD             Critical Care Time:     none      Diagnosis     Clinical Impression:   1. Facial injury, initial encounter    2. Facial contusion, initial encounter    3.  Migraine without status migrainosus, not intractable, unspecified migraine type 4. Acute cystitis without hematuria        PLAN:  1. Current Discharge Medication List      START taking these medications    Details   butalbital-acetaminophen-caff (Fioricet) -40 mg per capsule Take 1 Capsule by mouth every four (4) hours as needed for Headache. Qty: 10 Capsule, Refills: 0  Start date: 7/12/2022      nitrofurantoin, macrocrystal-monohydrate, (Macrobid) 100 mg capsule Take 1 Capsule by mouth two (2) times a day for 3 days. Qty: 6 Capsule, Refills: 0  Start date: 7/12/2022, End date: 7/15/2022           2. Follow-up Information     Follow up With Specialties Details Why Contact Info    Alix Johnson MD Family Medicine Schedule an appointment as soon as possible for a visit in 2 days  [place] [phone]          Return to ED if worse     Disposition:    2:55 AM    The patient's results have been reviewed with family and/or caregiver. They verbally convey their understanding and agreement of the patient's signs, symptoms, diagnosis, treatment and prognosis and additionally agree to follow up as recommended in the discharge instructions or to return to the Emergency Room should the patient's condition change prior to their follow-up appointment. The family and/or caregiver verbally agrees with the care-plan and all of their questions have been answered. The discharge instructions have also been provided to the them with educational information regarding the patient's diagnosis as well a list of reasons why the patient would want to return to the ER prior to their follow-up appointment should their condition change.   Claudene Nanny, MD

## 2022-07-13 ENCOUNTER — TELEPHONE (OUTPATIENT)
Dept: NEUROLOGY | Age: 47
End: 2022-07-13

## 2022-07-13 ENCOUNTER — OFFICE VISIT (OUTPATIENT)
Dept: NEUROLOGY | Age: 47
End: 2022-07-13
Payer: COMMERCIAL

## 2022-07-13 VITALS
SYSTOLIC BLOOD PRESSURE: 110 MMHG | HEART RATE: 71 BPM | OXYGEN SATURATION: 99 % | HEIGHT: 60 IN | TEMPERATURE: 97.3 F | BODY MASS INDEX: 49.87 KG/M2 | RESPIRATION RATE: 16 BRPM | WEIGHT: 254 LBS | DIASTOLIC BLOOD PRESSURE: 82 MMHG

## 2022-07-13 DIAGNOSIS — G93.2 PSEUDOTUMOR CEREBRI: ICD-10-CM

## 2022-07-13 DIAGNOSIS — R25.1 TREMOR: ICD-10-CM

## 2022-07-13 DIAGNOSIS — G43.109 COMPLICATED MIGRAINE: ICD-10-CM

## 2022-07-13 DIAGNOSIS — M79.7 FIBROMYALGIA: ICD-10-CM

## 2022-07-13 DIAGNOSIS — F41.1 GAD (GENERALIZED ANXIETY DISORDER): ICD-10-CM

## 2022-07-13 DIAGNOSIS — G43.711 INTRACTABLE CHRONIC MIGRAINE WITHOUT AURA AND WITH STATUS MIGRAINOSUS: ICD-10-CM

## 2022-07-13 DIAGNOSIS — M54.50 MYOFASCIAL LOW BACK PAIN: ICD-10-CM

## 2022-07-13 DIAGNOSIS — G43.719 INTRACTABLE CHRONIC MIGRAINE WITHOUT AURA AND WITHOUT STATUS MIGRAINOSUS: Primary | ICD-10-CM

## 2022-07-13 DIAGNOSIS — R56.9 SEIZURE-LIKE ACTIVITY (HCC): ICD-10-CM

## 2022-07-13 PROCEDURE — 99215 OFFICE O/P EST HI 40 MIN: CPT | Performed by: PSYCHIATRY & NEUROLOGY

## 2022-07-13 RX ORDER — TRAZODONE HYDROCHLORIDE 50 MG/1
50 TABLET ORAL
COMMUNITY
End: 2022-10-26 | Stop reason: SDUPTHER

## 2022-07-13 RX ORDER — FREMANEZUMAB-VFRM 225 MG/1.5ML
225 INJECTION SUBCUTANEOUS ONCE
Qty: 1.5 ML | Refills: 11 | Status: SHIPPED | COMMUNITY
Start: 2022-07-13 | End: 2022-07-13

## 2022-07-13 RX ORDER — CLONAZEPAM 1 MG/1
1 TABLET ORAL
Qty: 30 TABLET | Refills: 2 | Status: SHIPPED | OUTPATIENT
Start: 2022-07-13

## 2022-07-13 RX ORDER — FREMANEZUMAB-VFRM 225 MG/1.5ML
225 INJECTION SUBCUTANEOUS
Qty: 1.5 ML | Refills: 11 | Status: SHIPPED | OUTPATIENT
Start: 2022-07-13 | End: 2022-07-13 | Stop reason: SDUPTHER

## 2022-07-13 NOTE — PROGRESS NOTES
1. Have you been to the ER, urgent care clinic since your last visit? Hospitalized since your last visit? Yesterday for TV falling on face. 2. Have you seen or consulted any other health care providers outside of the [address] [place] [address] since your last visit? Include any pap smears or colon screening.    No.      Chief Complaint   Patient presents with    Migraine     6 month- having migraine now, more often , seeing white and black while having migraine which is new

## 2022-07-13 NOTE — PROGRESS NOTES
Neurology progress note      HISTORY PROVIDED BY: patient    Chief Complaint:   Chief Complaint   Patient presents with    Migraine     6 month- having migraine now, more often , seeing white and black while having migraine which is new      Subjective:    Ayanna Manzano is a 48 y.o. right handed female who presents in office for continuation of care for migraine headache  This is a 47-year-old right-handed obese female with history of asthma, diabetes mellitus, fibromyalgia, hypertension, low back injury, migraine headaches, seizures, sleep apnea who is a former patient of Dr. Mery Beck, currently establishing care with me. Patient says she still having daily headaches of varying intensity, headache is throbbing in nature, holocephalic, occasional sharp pain coming from the back of the head. Headache associated with dizziness, blurry vision, nausea, photophobia, phonophobia. She says she experiences occasional confusion and loss of consciousness mostly with headache. She says photopsia is new. Headache has been going on for more than 30 years, she has been on the following medication without much help:  Depakote  Zonegran  Verapamil  Elavil  Pamelor  Botox injection  Cymbalta  Neurontin  Lyrica  Patient is currently on CGRP-Aimovig/Ubrelvy and also occasionally receive trigger point injection. She noted that medications tend  to take the edge off but the headache is still there. Patient also has pseudotumor cerebri or idiopathic intracranial hypertension for which he gets Diamox. Patient has had a series of EEG for possible seizure which have been coming up unremarkable, I will still order another EEG for this patient I will follow it up with 24-hour EEG.   She denies dysphagia or odynophagia  Review of Systems - General ROS: positive for  - fatigue, malaise, night sweats, and sleep disturbance  Psychological ROS: positive for - anxiety, concentration difficulties, depression, disorientation, memory difficulties, and sleep disturbances  Ophthalmic ROS: positive for - decreased vision, double vision, and photophobia  ENT ROS: positive for - headaches, tinnitus, vertigo, and visual changes  Allergy and Immunology ROS: negative  Hematological and Lymphatic ROS: negative  Endocrine ROS: negative  Respiratory ROS: no cough, shortness of breath, or wheezing  Cardiovascular ROS: no chest pain or dyspnea on exertion  Gastrointestinal ROS: no abdominal pain, change in bowel habits, or black or bloody stools  Genito-Urinary ROS: no dysuria, trouble voiding, or hematuria  Musculoskeletal ROS: positive for - joint pain, muscle pain, and muscular weakness  Neurological ROS: positive for - confusion, dizziness, headaches, impaired coordination/balance, memory loss, numbness/tingling, seizures, visual changes, and weakness  Dermatological ROS: negative     Past Medical History:   Diagnosis Date    Asthma     Diabetes ([facility] [address]     Fibromyalgia     Hypertension     Lower back injury 02/13/2017    Migraines     Nausea and vomiting 1/14/2014    Other ill-defined conditions(799.89)     cerebral tumor, endometriosis, fibromyalgia, herpes    Right lower quadrant abdominal mass 1/14/2014    Seizures (HCC)     Sleep apnea     intolerant to CPAP    Unspecified adverse effect of anesthesia     pt states that she has an asthma attack when coming out of  anesthesia      Past Surgical History:   Procedure Laterality Date    HX CHOLECYSTECTOMY      HX HEENT      wisdom teeth    HX OTHER SURGICAL      laproscopy on ovaries    HX OTHER SURGICAL      left wrist ganglion cystectomy    HX OTHER SURGICAL      lung mass removed    HX TOTAL COLECTOMY  3/14/2013    Rt colectomy for perf transverse diverticulum      Social History     Socioeconomic History    Marital status: LEGALLY      Spouse name: Not on file    Number of children: Not on file    Years of education: Not on file    Highest education level: Not on file   Occupational History Not on file   Tobacco Use    Smoking status: Never    Smokeless tobacco: Never    Tobacco comments:     vapor   Vaping Use    Vaping Use: Never used   Substance and Sexual Activity    Alcohol use: Not Currently     Comment: ocasionally    Drug use: Not on file     Comment: occ    Sexual activity: Not on file   Other Topics Concern    Not on file   Social History Narrative    Not on file     Social Determinants of Health     Financial Resource Strain: Not on file   Food Insecurity: Not on file   Transportation Needs: Not on file   Physical Activity: Not on file   Stress: Not on file   Social Connections: Not on file   Intimate Partner Violence: Not on file   Housing Stability: Not on file     Family History   Problem Relation Age of Onset    Other Other         unable to obtain due to AMS    Hypertension Father     Heart Disease Father     Cancer Father     Elevated Lipids Father     Hypertension Mother     Diabetes Maternal Grandmother     Diabetes Maternal Grandfather     Lupus Sister     Hypertension Sister     Elevated Lipids Sister          Objective:   ROS  As per HPI    Allergies   Allergen Reactions    Latex Other (comments)    Fentanyl Shortness of Breath     Other reaction(s): wheezing/sob  Other reaction(s): wheezing/sob  Other reaction(s): wheezing/sob      Lemon Anaphylaxis     Other reaction(s): anaphylaxis/angioedema    Adhesive Tape-Silicones Other (comments)     Pt reports it burns her skin    Dilaudid [Hydromorphone (Pf)] Itching    Percocet [Oxycodone-Acetaminophen] Nausea and Vomiting     Pt states not allergic! Meds:  Outpatient Medications Prior to Visit   Medication Sig Dispense Refill    traZODone (DESYREL) 50 mg tablet Take 50 mg by mouth nightly. butalbital-acetaminophen-caff (Fioricet) -40 mg per capsule Take 1 Capsule by mouth every four (4) hours as needed for Headache.  10 Capsule 0    acetaminophen-codeine (TYLENOL #3) 300-30 mg per tablet Take 1 Tablet by mouth every eight (8) hours as needed. diclofenac EC (VOLTAREN) 75 mg EC tablet diclofenac sodium 75 mg tablet,delayed release   TK 1 T PO BID      butalbitaL-acetaminop-caf-cod -81-30 mg cap butalbital 50 mg-acetaminophen 300 mg-caffeine 40 mg-codeine 30 mg cap   Take 1 capsule every 4 hours by oral route.      etodolac (LODINE) 400 mg tablet etodolac 400 mg tablet   TAKE 1 TABLET BY MOUTH TWICE DAILY      medroxyPROGESTERone (DEPO-PROVERA) 150 mg/mL injection 150 mg by IntraMUSCular route.      magnesium oxide 250 mg magnesium tablet magnesium 250 mg (as magnesium oxide) tablet   TK 1 T PO BID      pantoprazole (PROTONIX) 40 mg tablet Take 40 mg by mouth daily. prochlorperazine (COMPAZINE) 10 mg tablet prochlorperazine maleate 10 mg tablet   TAKE 1 TABLET BY MOUTH THREE TIMES DAILY      promethazine (PHENERGAN) 25 mg tablet Take 25 mg by mouth every eight (8) hours as needed. busPIRone (BUSPAR) 15 mg tablet Take 1 Tablet by mouth three (3) times daily. 270 Tablet 3    DULoxetine (CYMBALTA) 60 mg capsule Take 1 Capsule by mouth daily. 90 Capsule 3    metaxalone (SKELAXIN) 800 mg tablet Take 1 Tablet by mouth three (3) times daily. 270 Tablet 3    atenoloL (TENORMIN) 50 mg tablet Take 1 Tablet by mouth two (2) times a day. 180 Tablet 3    QUEtiapine (SEROquel) 50 mg tablet TAKE 1 TABLET BY MOUTH EVERY NIGHT 90 Tablet 3    atorvastatin (LIPITOR) 20 mg tablet Take 1 Tablet by mouth daily. 90 Tablet 3    traZODone (DESYREL) 100 mg tablet TAKE 1 TABLET BY MOUTH EVERY NIGHT (Patient taking differently: Take 100 mg by mouth nightly.) 90 Tablet 3    cyanocobalamin (VITAMIN B12) 1,000 mcg/mL injection 1,000 mcg by IntraMUSCular route every Sunday. albuterol (PROVENTIL HFA, VENTOLIN HFA, PROAIR HFA) 90 mcg/actuation inhaler Take 1 Puff by inhalation every six (6) hours as needed for Wheezing. therapeutic multivitamin (THERAGRAN) tablet Take 1 Tablet by mouth daily.       ascorbic acid, vitamin C, (Vitamin C) 500 mg tablet Take 500 mg by mouth daily. cholecalciferol (VITAMIN D3) (1000 Units /25 mcg) tablet Take 1,000 Units by mouth daily. zonisamide (ZONEGRAN) 100 mg capsule TAKE 2 CAPSULES BY MOUTH TWICE DAILY 360 Capsule 3    ondansetron hcl (ZOFRAN) 4 mg tablet TAKE 1 TABLET BY MOUTH DAILY AS NEEDED FOR NAUSEA OR VOMITING 90 Tablet 3    tiZANidine (ZANAFLEX) 4 mg tablet Take one in the day and two at night 90 Tablet 3    eletriptan (RELPAX) 40 mg tablet Take 1 Tablet by mouth daily as needed (migraine. May repeat in 40 minutes no more than 2 in 24 hours). 36 Tablet 3    metFORMIN (GLUCOPHAGE) 1,000 mg tablet TAKE 1 TABLET BY MOUTH TWICE A DAY(WITH MORNING AND EVENING MEAL) 240 Tab 1    omeprazole (PRILOSEC) 20 mg capsule Take 20 mg by mouth daily. fluticasone-salmeterol (ADVAIR DISKUS) 250-50 mcg/dose diskus inhaler Take 1 Puff by inhalation every twelve (12) hours. diphenhydrAMINE (BENADRYL) 25 mg capsule Take 25 mg by mouth as needed. Indications: [facility]erigc to [facility] - only takes with evette      nitrofurantoin, macrocrystal-monohydrate, (Macrobid) 100 mg capsule Take 1 Capsule by mouth two (2) times a day for 3 days. 6 Capsule 0    acyclovir (ZOVIRAX) 400 mg tablet acyclovir 400 mg tablet (Patient not taking: Reported on 7/13/2022)      liraglutide (Victoza 2-Todd) 0.6 mg/0.1 mL (18 mg/3 mL) pnij Victoza 3-Todd 0.6 mg/0.1 mL (18 mg/3 mL) subcutaneous pen injector (Patient not taking: Reported on 7/13/2022)      acetaZOLAMIDE SR (DIAMOX) 500 mg capsule Take 1 Capsule by mouth two (2) times a day. 180 Capsule 3    clonazePAM (KlonoPIN) 0.5 mg tablet Take 1 Tablet by mouth every eight (8) hours.       fremanezumab-vfrm (Ajovy Syringe) 225 mg/1.5 mL syrg injection Ajovy Syringe 225 mg/1.5 mL subcutaneous (Patient not taking: Reported on 1/12/2022)      predniSONE (STERAPRED DS) 10 mg dose pack prednisone 10 mg tablets in a dose pack      rimegepant (Nurtec ODT) 75 mg disintegrating tablet Nurtec ODT 75 mg disintegrating tablet   Prior authorization is required (Patient not taking: Reported on 7/13/2022)      pregabalin (LYRICA) 200 mg capsule Take 1 Capsule by mouth two (2) times a day. Max Daily Amount: 400 mg. 180 Capsule 3    metFORMIN (GLUCOPHAGE) 1,000 mg tablet Take 1 Tablet by mouth two (2) times daily (with meals). 180 Tablet 3    acetaZOLAMIDE SR (DIAMOX) 500 mg capsule Take 1,000 mg by mouth two (2) times a day. atenoloL (TENORMIN) 50 mg tablet Take 100 mg by mouth daily. erenumab-aooe (AIMOVIG) 140 mg/mL injection 140 mg by SubCUTAneous route every thirty (30) days. metaxalone (SKELAXIN) 800 mg tablet Take 800 mg by mouth three (3) times daily. pregabalin (LYRICA) 200 mg capsule Take 1 Capsule by mouth two (2) times a day. Max Daily Amount: 400 mg. (Patient not taking: Reported on 7/13/2022) 180 Capsule 3    busPIRone (BUSPAR) 5 mg tablet Take 15 mg by mouth two (2) times a day. No facility-administered medications prior to visit. Imaging:  MRI Results (most recent):  Results from Hospital Encounter encounter on 04/27/21    MRI BRAIN W WO CONT    Narrative  EXAM:  MRI BRAIN W WO CONT    INDICATION:    Migraine, transient alteration of awareness. COMPARISON:  CT head 10/5/2018. CONTRAST: 20 ml Dotarem. TECHNIQUE:  Multiplanar multisequence acquisition without and with contrast of the brain. FINDINGS:  The ventricles are normal in size and position. There are small foci of rounded  T2/FLAIR hyperintensity noted within the bilateral globus pallidus. The brain  parenchyma otherwise has normal signal characteristics. There is no acute  infarct, hemorrhage, extra-axial fluid collection, or mass effect. There is no  cerebellar tonsillar herniation. Expected arterial flow-voids are present. No  evidence of abnormal enhancement. The paranasal sinuses, mastoid air cells, and middle ears are clear. The orbital  contents are within normal limits.  No significant osseous or scalp lesions are  identified. Impression  1. Small foci of rounded T2/FLAIR hyperintensity in the bilateral globus  pallidus, suggestive of encephalomalacia from a nonspecific remote insult, and  most commonly seen as sequela of carbon monoxide toxicity. Correlate clinically. 2. Otherwise unremarkable brain MRI. CT Results (most recent):  Results from [place] encounter on 03/23/20    CT PELV WO CONT    Narrative  EXAM: CT PELV WO CONT    INDICATION: Left hip pain    COMPARISON: None    TECHNIQUE: Helical CT of the left hip with coronal and sagittal reformats. Images reviewed in soft tissue and bone windows. CT dose reduction was achieved  through the use of a standardized protocol tailored for this examination and  automatic exposure control for dose modulation. CONTRAST: None. FINDINGS: There is no evidence of fracture. Mild joint space narrowing is noted  in the left hip. No significant osteophyte formation is present. Other findings include facet arthrosis in the lower lumbar spine. There is  calcification or ossification the left hamstring origin. Visualized soft tissues are unremarkable. Impression  IMPRESSION:    1. Mild joint space narrowing left hip without evidence of significant  osteophyte formation. 2.  Calcification or ossification of the left hamstring origin possibly  representing chronic avulsive changes, these adenopathy, or calcific tendinosis.        Reviewed records in [facility] tab today    Lab Review   Results for orders placed or performed during the hospital encounter of 07/12/22   URINALYSIS W/ REFLEX CULTURE    Specimen: Urine   Result Value Ref Range    Color YELLOW/STRAW      Appearance CLOUDY (A) CLEAR      Specific gravity 1.025      pH (UA) 7.5 5.0 - 8.0      Protein TRACE (A) NEG mg/dL    Glucose Negative NEG mg/dL    Ketone Negative NEG mg/dL    Bilirubin Negative NEG      Blood Negative NEG      Urobilinogen 1.0 0.2 - 1.0 EU/dL    Nitrites Negative NEG      Leukocyte Esterase TRACE (A) NEG      UA:UC IF INDICATED CULTURE NOT INDICATED BY UA RESULT CNI      WBC 0-4 0 - 4 /hpf    RBC 0-5 0 - 5 /hpf    Epithelial cells MANY (A) FEW /lpf    Bacteria 4+ (A) NEG /hpf    Hyaline cast 0-2 0 - 2 /lpf        Exam:  Visit Vitals  /82 (BP 1 Location: Left upper arm, BP Patient Position: Sitting, BP Cuff Size: Large adult)   Pulse 71   Temp 97.3 °F (36.3 °C) (Temporal)   Resp 16   Ht 5' (1.524 m)   Wt 254 lb (115.2 kg)   SpO2 99%   BMI 49.61 kg/m²     General:  Alert, cooperative, no distress. Head:  Normocephalic, without obvious abnormality, atraumatic. Respiratory:  Heart:   Non labored breathing  Regular rate and rhythm, no murmurs   Neck:   2+ carotids, no bruits   Extremities: Warm, no cyanosis or edema. Pulses: 2+ radial pulses. Neurologic:  MS: Alert and oriented x 4, speech intact. Language intact, able to name, repeat, and follow all commands. Attention and fund of knowledge appropriate. Recent and remote memory intact. Cranial Nerves:  II: visual fields Full to confrontation   II: pupils Equal, round, reactive to light   II: optic disc No papilledema   III,VII: ptosis none   III,IV,VI: extraocular muscles  EOMI, no nystagmus or diplopia   V: facial light touch sensation  normal   VII: facial muscle function   symmetric   VIII: hearing intact   IX: soft palate elevation  normal   XI: trapezius strength  5/5   XI: sternocleidomastoid strength 5/5   XII: tongue  Midline     Motor: normal bulk and tone, no tremor              Strength: 5/5 throughout, no PD  Sensory: Equivocal sensation to LT, PP,temperature and vibration  Coordination: FTN and HTS intact, AUSTIN intact,Romberg positive  Gait: normal gait, unable to heel, toe, and tandem walk  Reflexes: 2+ symmetric  Plantar:Withdrawn           Assessment/Plan       ICD-10-CM ICD-9-CM    1.  Intractable chronic migraine without aura and without status migrainosus  G43.719 346.71 clonazePAM (KlonoPIN) 1 mg tablet      2. Intractable chronic migraine without aura and with status migrainosus  G43.711 346.73       3. Complicated migraine  N83.305 346.00       4. Tremor  R25.1 781.0 EEG      5. Fibromyalgia  M79.7 729.1       6. Myofascial low back pain  M54.50 724.2       7. Pseudotumor cerebri  G93.2 348.2       8. Seizure-like activity (HCC)  R56.9 780.39 EEG      9. GRISEL (generalized anxiety disorder)  F41.1 300.02 clonazePAM (KlonoPIN) 1 mg tablet      Plan:  Ubrelvy 100 mg p.o. as needed severe headache  Klonopin 1 mg p.o. nightly  I will continue Diamox  Aimovig  Zanaflex 4 mg  p.o. q am, 8 mg p.o. qhs  Will consider Trigger point injection  EEG      Follow-up and Dispositions    Return in about 3 months (around 10/13/2022).          .    Hernan Florez MD  7/13/2022     Disoriented with severe migraine  Daily headche  Cps  See dots

## 2022-07-13 NOTE — TELEPHONE ENCOUNTER
Called pt,verified pt with two pt identifiers, advised pt her sample is ready for -pt left office. She advised she will come back to . Pt verbalized understanding.

## 2022-07-19 ENCOUNTER — TELEPHONE (OUTPATIENT)
Dept: NEUROLOGY | Age: 47
End: 2022-07-19

## 2022-08-09 RX ORDER — ERENUMAB-AOOE 140 MG/ML
140 INJECTION, SOLUTION SUBCUTANEOUS
COMMUNITY
End: 2022-08-09 | Stop reason: SDUPTHER

## 2022-08-09 RX ORDER — ERENUMAB-AOOE 140 MG/ML
140 INJECTION, SOLUTION SUBCUTANEOUS
Qty: 1 EACH | Refills: 5 | Status: SHIPPED | OUTPATIENT
Start: 2022-08-09

## 2022-08-09 NOTE — TELEPHONE ENCOUNTER
Called pt,verified pt with two pt identifiers, advised pt I received request from pharmacy for Ely Mignon refill but I do not have that on her med list. We have Ajovy. Pt verified she is on Aimovig 140 mg monthly. Verified script and pharmacy and we will get that sent in. Pt verbalized understanding. Sent refill request to  on call-as  is not here.

## 2022-08-30 DIAGNOSIS — G93.2 PSEUDOTUMOR CEREBRI: ICD-10-CM

## 2022-08-30 NOTE — TELEPHONE ENCOUNTER
Requested Prescriptions     Pending Prescriptions Disp Refills    acetaZOLAMIDE SR (DIAMOX) 500 mg capsule 180 Capsule 3     Sig: Take 1 Capsule by mouth two (2) times a day. Last office visit: 7/13/2022 teena blood    Last refill: 1/12/2022 by latrell      Next appointment: 9/22/2022 teena [place]    Please review and fill if warranted.

## 2022-08-31 DIAGNOSIS — G43.711 INTRACTABLE CHRONIC MIGRAINE WITHOUT AURA AND WITH STATUS MIGRAINOSUS: ICD-10-CM

## 2022-08-31 RX ORDER — ACETAZOLAMIDE 500 MG/1
500 CAPSULE, EXTENDED RELEASE ORAL 2 TIMES DAILY
Qty: 180 CAPSULE | Refills: 3 | Status: SHIPPED | OUTPATIENT
Start: 2022-08-31 | End: 2022-09-23

## 2022-09-01 RX ORDER — ZONISAMIDE 100 MG/1
CAPSULE ORAL
Qty: 360 CAPSULE | Refills: 1 | Status: SHIPPED | OUTPATIENT
Start: 2022-09-01 | End: 2022-10-26 | Stop reason: SDUPTHER

## 2022-09-01 RX ORDER — ELETRIPTAN HYDROBROMIDE 40 MG/1
40 TABLET, FILM COATED ORAL
Qty: 36 TABLET | Refills: 1 | Status: SHIPPED | OUTPATIENT
Start: 2022-09-01

## 2022-09-22 ENCOUNTER — OFFICE VISIT (OUTPATIENT)
Dept: NEUROLOGY | Age: 47
End: 2022-09-22
Payer: COMMERCIAL

## 2022-09-22 VITALS
BODY MASS INDEX: 47.35 KG/M2 | DIASTOLIC BLOOD PRESSURE: 80 MMHG | WEIGHT: 250.8 LBS | OXYGEN SATURATION: 96 % | TEMPERATURE: 97.4 F | HEIGHT: 61 IN | RESPIRATION RATE: 16 BRPM | SYSTOLIC BLOOD PRESSURE: 120 MMHG | HEART RATE: 80 BPM

## 2022-09-22 DIAGNOSIS — G43.711 INTRACTABLE CHRONIC MIGRAINE WITHOUT AURA AND WITH STATUS MIGRAINOSUS: Primary | ICD-10-CM

## 2022-09-22 DIAGNOSIS — F41.8 ANXIETY WITH DEPRESSION: ICD-10-CM

## 2022-09-22 PROCEDURE — 99213 OFFICE O/P EST LOW 20 MIN: CPT | Performed by: NURSE PRACTITIONER

## 2022-09-22 RX ORDER — TAMSULOSIN HYDROCHLORIDE 0.4 MG/1
CAPSULE ORAL
COMMUNITY
Start: 2022-09-13

## 2022-09-22 RX ORDER — AMOXICILLIN 875 MG/1
875 TABLET, FILM COATED ORAL EVERY 12 HOURS
COMMUNITY
Start: 2022-09-20

## 2022-09-22 RX ORDER — ONABOTULINUMTOXINA 200 [USP'U]/1
INJECTION, POWDER, LYOPHILIZED, FOR SOLUTION INTRADERMAL; INTRAMUSCULAR
Qty: 200 UNITS | Refills: 3 | Status: SHIPPED | OUTPATIENT
Start: 2022-09-22 | End: 2022-10-17 | Stop reason: SDUPTHER

## 2022-09-22 RX ORDER — EPINEPHRINE 0.3 MG/.3ML
INJECTION SUBCUTANEOUS
COMMUNITY
Start: 2022-08-22

## 2022-09-22 RX ORDER — BENZONATATE 200 MG/1
CAPSULE ORAL
COMMUNITY
Start: 2022-08-24 | End: 2022-09-22 | Stop reason: ALTCHOICE

## 2022-09-22 RX ORDER — PREDNISONE 5 MG/1
TABLET ORAL
Qty: 21 TABLET | Refills: 0 | Status: SHIPPED | OUTPATIENT
Start: 2022-09-22 | End: 2022-10-26 | Stop reason: SDUPTHER

## 2022-09-22 RX ORDER — ELAGOLIX 200 MG/1
1 TABLET, FILM COATED ORAL 2 TIMES DAILY
COMMUNITY
Start: 2022-08-29

## 2022-09-22 RX ORDER — ROSUVASTATIN CALCIUM 20 MG/1
20 TABLET, COATED ORAL DAILY
COMMUNITY
Start: 2022-09-16

## 2022-09-22 NOTE — PROGRESS NOTES
Chief Complaint   Patient presents with    Follow-up      Intractable chronic migraine without aura and without status migrainosus ? EEG       1. Have you been to the ER, urgent care clinic since your last visit? Yes  Hospitalized since your last visit? No    2. Have you seen or consulted any other health care providers outside of the [address] [place] [address] since your last visit? Yes PCP  Include any pap smears or colon screening. Yes papsmear    Patient C/O headache 9/10 for the last 2 weeks has been on treatment for UTI.

## 2022-09-22 NOTE — PROGRESS NOTES
Date:  22    Name:  Indu Sanford  :  1975  MRN:  [id number]       REQUESTING PHYSICIAN:  Saeid Ca MD    Chief Complaint   Patient presents with    Follow-up      Intractable chronic migraine without aura and without status migrainosus ? EEG       HISTORY OF PRESENT ILLNESS:   Patient presents today for Headaches migraines. Received botox for 2 years, had a provider leave that office and there was no one that could do it for her, so she hasn't had it for nearly two years. Patient would like to resume Botox injections because she is just miserable, the migraines are out of control. Toradol does not seem to help when she is in a lot of pain, only the cocktail the ER can give her helps. EEG ordered at last visit, it has not been done yet. Ubrelvy 100 mg p.o. as needed severe headache, Coni triptan helps as well. Aimovig: She does this monthly. Headache Characteristics: squeezing pain and hitting with a bat. Location of the headache:  left side of head  Frequency: 20+/30 days. Length of headache/migraine: can last from days to weeks. Pain Level:   9/10  Aura:yes at times  Nausea/Vomiting: yes, some vomiting. Photophobia: yes  Phonophobia: yes  Provoking factors: raw meat smells. Relieving factors: Dark room, laying down, not much. 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 Caffeine:pepsi 5-6 per day. Tobacco use:no. Some marijuana. No etoh. Sleep habits:poor  Exercise:walking    Review of Systems   General Constitional ROS:   Psychological ROS: c/o anxiety and depression, c/o sleep issues. ENT ROS: Negative. Endocrine ROS: Negative. Respiratory ROS: Negative. Cardiovascular ROS: Negative  Gastrointestinal ROS: Negative.     Genito-Urinary ROS: Negative. Musculoskeletal ROS: C/o  back, no N/T. Neurological ROS: C/o headache. Complains of dizziness at times. Current Outpatient Medications   Medication Sig    amoxicillin (AMOXIL) 875 mg tablet Take 875 mg by mouth every twelve (12) hours. Orilissa 200 mg tab Take 1 Tablet by mouth two (2) times a day. EPINEPHrine (EPIPEN) 0.3 mg/0.3 mL injection ADMINISTER 0.3 ML IN THE MUSCLE 1 TIME AS NEEDED FOR ANAPHYLAXIS    rosuvastatin (CRESTOR) 20 mg tablet Take 20 mg by mouth daily. tamsulosin (FLOMAX) 0.4 mg capsule TAKE 1 CAPSULE BY MOUTH EVERY DAY 30 MINUTES AFTER THE SAME MEAL    onabotulinumtoxinA (Botox) 200 unit injection 155 units by IM to 31 approved sites into head and neck every 12 weeks for chronic migraine. Indications: migraine prevention    predniSONE (STERAPRED) 5 mg dose pack See administration instruction per 5mg dose pack    eletriptan (RELPAX) 40 mg tablet Take 1 Tablet by mouth daily as needed (migraine. May repeat in 40 minutes no more than 2 in 24 hours). zonisamide (ZONEGRAN) 100 mg capsule TAKE 2 CAPSULES BY MOUTH TWICE DAILY    acetaZOLAMIDE SR (DIAMOX) 500 mg capsule Take 1 Capsule by mouth two (2) times a day. erenumab-aooe (Aimovig Autoinjector) 140 mg/mL injection 1 mL by SubCUTAneous route every thirty (30) days. traZODone (DESYREL) 50 mg tablet Take 50 mg by mouth nightly. ubrogepant Anthony Jacks) 100 mg tablet Take 1 tab p.o. prn for severe  Headache , not to exceed 1 dose in 24 hrs    clonazePAM (KlonoPIN) 1 mg tablet Take 1 Tablet by mouth nightly. Max Daily Amount: 1 mg.    butalbital-acetaminophen-caff (Fioricet) -40 mg per capsule Take 1 Capsule by mouth every four (4) hours as needed for Headache.    acyclovir (ZOVIRAX) 400 mg tablet Take 400 mg by mouth as needed.     diclofenac EC (VOLTAREN) 75 mg EC tablet diclofenac sodium 75 mg tablet,delayed release   TK 1 T PO BID    etodolac (LODINE) 400 mg tablet etodolac 400 mg tablet   TAKE 1 TABLET BY MOUTH TWICE DAILY    magnesium oxide 250 mg magnesium tablet magnesium 250 mg (as magnesium oxide) tablet   TK 1 T PO BID    pantoprazole (PROTONIX) 40 mg tablet Take 40 mg by mouth daily. prochlorperazine (COMPAZINE) 10 mg tablet prochlorperazine maleate 10 mg tablet   TAKE 1 TABLET BY MOUTH THREE TIMES DAILY    promethazine (PHENERGAN) 25 mg tablet Take 25 mg by mouth every eight (8) hours as needed. busPIRone (BUSPAR) 15 mg tablet Take 1 Tablet by mouth three (3) times daily. metaxalone (SKELAXIN) 800 mg tablet Take 1 Tablet by mouth three (3) times daily. atenoloL (TENORMIN) 50 mg tablet Take 1 Tablet by mouth two (2) times a day. QUEtiapine (SEROquel) 50 mg tablet TAKE 1 TABLET BY MOUTH EVERY NIGHT    atorvastatin (LIPITOR) 20 mg tablet Take 1 Tablet by mouth daily. traZODone (DESYREL) 100 mg tablet TAKE 1 TABLET BY MOUTH EVERY NIGHT (Patient taking differently: Take 100 mg by mouth nightly.)    cyanocobalamin (VITAMIN B12) 1,000 mcg/mL injection 1,000 mcg by IntraMUSCular route every Sunday. albuterol (PROVENTIL HFA, VENTOLIN HFA, PROAIR HFA) 90 mcg/actuation inhaler Take 1 Puff by inhalation every six (6) hours as needed for Wheezing. therapeutic multivitamin (THERAGRAN) tablet Take 1 Tablet by mouth daily. ascorbic acid, vitamin C, (VITAMIN C) 500 mg tablet Take 500 mg by mouth daily. cholecalciferol (VITAMIN D3) (1000 Units /25 mcg) tablet Take 1,000 Units by mouth daily. ondansetron hcl (ZOFRAN) 4 mg tablet TAKE 1 TABLET BY MOUTH DAILY AS NEEDED FOR NAUSEA OR VOMITING    tiZANidine (ZANAFLEX) 4 mg tablet Take one in the day and two at night    metFORMIN (GLUCOPHAGE) 1,000 mg tablet TAKE 1 TABLET BY MOUTH TWICE A DAY(WITH MORNING AND EVENING MEAL)    omeprazole (PRILOSEC) 20 mg capsule Take 20 mg by mouth daily. fluticasone propion-salmeteroL (ADVAIR/WIXELA) 250-50 mcg/dose diskus inhaler Take 1 Puff by inhalation every twelve (12) hours.     diphenhydrAMINE (BENADRYL) 25 mg capsule Take 25 mg by mouth as needed. Indications: [facility]erig[facility] to [facility] - only takes with evette    butalbitaL-acetaminop-caf-cod -11-30 mg cap butalbital 50 mg-acetaminophen 300 mg-caffeine 40 mg-codeine 30 mg cap   Take 1 capsule every 4 hours by oral route. No current facility-administered medications for this visit. Allergies   Allergen Reactions    Latex Other (comments)    Fentanyl Shortness of Breath     Other reaction(s): wheezing/sob  Other reaction(s): wheezing/sob  Other reaction(s): wheezing/sob      Lemon Anaphylaxis     Other reaction(s): anaphylaxis/angioedema    Adhesive Tape-Silicones Other (comments)     Pt reports it burns her skin    Dilaudid [Hydromorphone (Pf)] Itching    Percocet [Oxycodone-Acetaminophen] Nausea and Vomiting     Pt states not allergic!      Past Medical History:   Diagnosis Date    Asthma     Diabetes ([facility] [address]     Fibromyalgia     Hypertension     Lower back injury 02/13/2017    Migraines     Nausea and vomiting 1/14/2014    Other ill-defined conditions(799.89)     cerebral tumor, endometriosis, fibromyalgia, herpes    Right lower quadrant abdominal mass 1/14/2014    Seizures ([facility])     Sleep apnea     intolerant to CPAP    Unspecified adverse effect of anesthesia     pt states that she has an asthma attack when coming out of  anesthesia     Past Surgical History:   Procedure Laterality Date    HX CHOLECYSTECTOMY      HX HEENT      wisdom teeth    HX OTHER SURGICAL      laproscopy on ovaries    HX OTHER SURGICAL      left wrist ganglion cystectomy    HX OTHER SURGICAL      lung mass removed    HX TOTAL COLECTOMY  3/14/2013    Rt colectomy for perf transverse diverticulum     Social History     Socioeconomic History    Marital status: LEGALLY      Spouse name: Not on file    Number of children: Not on file    Years of education: Not on file    Highest education level: Not on file   Occupational History    Not on file   Tobacco Use    Smoking status: Never Smokeless tobacco: Never    Tobacco comments:     vapor   Vaping Use    Vaping Use: Never used   Substance and Sexual Activity    Alcohol use: Not Currently     Comment: ocasionally    Drug use: Not on file     Comment: occ    Sexual activity: Not on file   Other Topics Concern    Not on file   Social History Narrative    Not on file     Social Determinants of Health     Financial Resource Strain: Not on file   Food Insecurity: Not on file   Transportation Needs: Not on file   Physical Activity: Not on file   Stress: Not on file   Social Connections: Not on file   Intimate Partner Violence: Not on file   Housing Stability: Not on file     Family History   Problem Relation Age of Onset    Other Other         unable to obtain due to AMS    Hypertension Father     Heart Disease Father     Cancer Father     Elevated Lipids Father     Hypertension Mother     Diabetes Maternal Grandmother     Diabetes Maternal Grandfather     Lupus Sister     Hypertension Sister     Elevated Lipids Sister        MRI Results (most recent):  Results from [place] encounter on 04/27/21    MRI BRAIN W WO CONT    Narrative  EXAM:  MRI BRAIN W WO CONT    INDICATION:    Migraine, transient alteration of awareness. COMPARISON:  CT head 10/5/2018. CONTRAST: 20 ml Dotarem. TECHNIQUE:  Multiplanar multisequence acquisition without and with contrast of the brain. FINDINGS:  The ventricles are normal in size and position. There are small foci of rounded  T2/FLAIR hyperintensity noted within the bilateral globus pallidus. The brain  parenchyma otherwise has normal signal characteristics. There is no acute  infarct, hemorrhage, extra-axial fluid collection, or mass effect. There is no  cerebellar tonsillar herniation. Expected arterial flow-voids are present. No  evidence of abnormal enhancement. The paranasal sinuses, mastoid air cells, and middle ears are clear. The orbital  contents are within normal limits.  No significant osseous or scalp lesions are  identified. Impression  1. Small foci of rounded T2/FLAIR hyperintensity in the bilateral globus  pallidus, suggestive of encephalomalacia from a nonspecific remote insult, and  most commonly seen as sequela of carbon monoxide toxicity. Correlate clinically. 2. Otherwise unremarkable brain MRI. PHYSICAL EXAMINATION:    Visit Vitals  /80 (BP 1 Location: Left upper arm, BP Patient Position: Sitting, BP Cuff Size: Large adult)   Pulse 80   Temp 97.4 °F (36.3 °C) (Temporal)   Resp 16   Ht 5' 1\" (1.549 m)   Wt 250 lb 12.8 oz (113.8 kg)   SpO2 96%   BMI 47.39 kg/m²     General:  Well defined, nourished, and groomed individual in no acute distress. Neck: Supple, nontender, normal ROM noted. Musculoskeletal:  Extremities revealed no edema and had full range of motion of joints. Psych:  Good mood and bright affect    NEUROLOGICAL EXAMINATION:     Mental Status:   Alert and oriented to person, place, and time with recent and remote memory intact. Attention span and concentration are normal. Speech is fluent with a full fund of knowledge. Cranial Nerves:    II, III, IV, VI:  Visual acuity grossly intact. Visual fields are normal.    Extra-ocular movements are full and fluid. No nystagmus. V-XII: Hearing is grossly intact. Facial features are symmetric, with normal sensation and strength. The palate rises symmetrically and the tongue protrudes midline. Sternocleidomastoids 5/5. Motor Examination: Normal tone, 5/5 muscle strength in BUE. Negative pronator drift. No resting or intention tremor. Sensory exam:  Normal sensation to light touch intact in BUE at all dermatomes. Coordination:   Finger to nose and rapid arm movement testing was normal.   Negative Romberg     Gait and Station:  Steady gait. Normal arm swing. Reflexes:  DTRs 2+ throughout. ASSESSMENT AND PLAN    ICD-10-CM ICD-9-CM    1.  Intractable chronic migraine without aura and with status migrainosus  G43.711 346.73 onabotulinumtoxinA (Botox) 200 unit injection      predniSONE (STERAPRED) 5 mg dose pack      2. Anxiety with depression  F41.8 300.4         1. Intractable chronic migraine without aura and with status migrainosus: We will proceed with Botox injections as she noted good efficacy with this in the past, she is to continue Aimovig for the time being with the hope of tapering her off of the Aimovig as well as many of her other medications once we get the patient stable regimen of Botox injections. .  Safety and side effects discussed with patient. Patient denies possibility of being pregnant. Discussed trying to limit caffeine intake via sodas, continue with walking, home exercise program.  Good sleep-wake cycle encouraged. We will give her a prednisone pack to help with the acuity of current migraine.  -     onabotulinumtoxinA (Botox) 200 unit injection; 155 units by IM to 31 approved sites into head and neck every 12 weeks for chronic migraine. Indications: migraine prevention, Print, Disp-200 Units, R-3  -     predniSONE (STERAPRED) 5 mg dose pack; See administration instruction per 5mg dose pack, Normal, Disp-21 Tablet, R-0  2. Anxiety with depression: Healthy lifestyle encouraged, she notes previously she was seen by psychiatry but not currently, encouraged her to try to find a new therapist and or psychiatrist to further help with the symptoms she is experiencing. Continue with walking. Patient and/or family verbalized understanding of all instructions. Safety/side effects of medications discussed. Patient remains a complex patient secondary to polypharmacy, significant comorbid conditions, and use of multiple medications which complicate the decision making process related to patient's neurologic diagnosis. I will see the patient back for scheduled Botox injections.     Anu Schmitt, EDWINA-BC

## 2022-09-23 DIAGNOSIS — M79.7 FIBROMYALGIA: ICD-10-CM

## 2022-09-23 DIAGNOSIS — F41.9 ANXIETY: ICD-10-CM

## 2022-09-23 DIAGNOSIS — F51.01 PRIMARY INSOMNIA: ICD-10-CM

## 2022-09-23 DIAGNOSIS — G93.2 PSEUDOTUMOR CEREBRI: ICD-10-CM

## 2022-09-23 DIAGNOSIS — E11.9 TYPE 2 DIABETES MELLITUS WITHOUT COMPLICATION, WITHOUT LONG-TERM CURRENT USE OF INSULIN (HCC): ICD-10-CM

## 2022-09-23 RX ORDER — TRAZODONE HYDROCHLORIDE 100 MG/1
TABLET ORAL
Qty: 90 TABLET | Refills: 3 | Status: SHIPPED | OUTPATIENT
Start: 2022-09-23

## 2022-09-23 RX ORDER — BUSPIRONE HYDROCHLORIDE 15 MG/1
TABLET ORAL
Qty: 270 TABLET | Refills: 3 | Status: SHIPPED | OUTPATIENT
Start: 2022-09-23 | End: 2022-10-26 | Stop reason: SDUPTHER

## 2022-09-23 RX ORDER — PREGABALIN 200 MG/1
CAPSULE ORAL
Qty: 180 CAPSULE | Refills: 5 | Status: SHIPPED | OUTPATIENT
Start: 2022-09-23

## 2022-09-23 RX ORDER — ACETAZOLAMIDE 500 MG/1
CAPSULE, EXTENDED RELEASE ORAL
Qty: 180 CAPSULE | Refills: 3 | Status: SHIPPED | OUTPATIENT
Start: 2022-09-23 | End: 2022-10-26 | Stop reason: SDUPTHER

## 2022-09-23 RX ORDER — QUETIAPINE FUMARATE 100 MG/1
TABLET, FILM COATED ORAL
Qty: 30 TABLET | Refills: 5 | Status: SHIPPED | OUTPATIENT
Start: 2022-09-23

## 2022-09-23 RX ORDER — METFORMIN HYDROCHLORIDE 1000 MG/1
TABLET ORAL
Qty: 240 TABLET | Refills: 1 | Status: SHIPPED | OUTPATIENT
Start: 2022-09-23

## 2022-09-23 RX ORDER — QUETIAPINE FUMARATE 50 MG/1
TABLET, FILM COATED ORAL
Qty: 90 TABLET | Refills: 3 | Status: SHIPPED | OUTPATIENT
Start: 2022-09-23

## 2022-10-05 ENCOUNTER — APPOINTMENT (OUTPATIENT)
Dept: CT IMAGING | Age: 47
End: 2022-10-05
Attending: STUDENT IN AN ORGANIZED HEALTH CARE EDUCATION/TRAINING PROGRAM
Payer: COMMERCIAL

## 2022-10-05 ENCOUNTER — HOSPITAL ENCOUNTER (EMERGENCY)
Age: 47
Discharge: HOME | End: 2022-10-06
Attending: STUDENT IN AN ORGANIZED HEALTH CARE EDUCATION/TRAINING PROGRAM
Payer: COMMERCIAL

## 2022-10-05 ENCOUNTER — TELEPHONE (OUTPATIENT)
Dept: NEUROLOGY | Age: 47
End: 2022-10-05

## 2022-10-05 DIAGNOSIS — R51.9 ACUTE INTRACTABLE HEADACHE, UNSPECIFIED HEADACHE TYPE: Primary | ICD-10-CM

## 2022-10-05 LAB
ALBUMIN SERPL-MCNC: 3.1 G/DL (ref 3.5–5)
ALBUMIN/GLOB SERPL: 0.8 {RATIO} (ref 1.1–2.2)
ALP SERPL-CCNC: 120 U/L (ref 45–117)
ALT SERPL-CCNC: 20 U/L (ref 12–78)
ANION GAP SERPL CALC-SCNC: 5 MMOL/L (ref 5–15)
AST SERPL-CCNC: 21 U/L (ref 15–37)
BASOPHILS # BLD: 0 K/UL (ref 0–0.1)
BASOPHILS NFR BLD: 0 % (ref 0–1)
BILIRUB SERPL-MCNC: 0.3 MG/DL (ref 0.2–1)
BUN SERPL-MCNC: 13 MG/DL (ref 6–20)
BUN/CREAT SERPL: 16 (ref 12–20)
CALCIUM SERPL-MCNC: 9 MG/DL (ref 8.5–10.1)
CHLORIDE SERPL-SCNC: 112 MMOL/L (ref 97–108)
CO2 SERPL-SCNC: 25 MMOL/L (ref 21–32)
CREAT SERPL-MCNC: 0.79 MG/DL (ref 0.55–1.02)
DIFFERENTIAL METHOD BLD: ABNORMAL
EOSINOPHIL # BLD: 0.1 K/UL (ref 0–0.4)
EOSINOPHIL NFR BLD: 1 % (ref 0–7)
ERYTHROCYTE [DISTWIDTH] IN BLOOD BY AUTOMATED COUNT: 15.2 % (ref 11.5–14.5)
GLOBULIN SER CALC-MCNC: 3.8 G/DL (ref 2–4)
GLUCOSE SERPL-MCNC: 105 MG/DL (ref 65–100)
HCT VFR BLD AUTO: 41.6 % (ref 35–47)
HGB BLD-MCNC: 12.9 G/DL (ref 11.5–16)
IMM GRANULOCYTES # BLD AUTO: 0 K/UL (ref 0–0.04)
IMM GRANULOCYTES NFR BLD AUTO: 0 % (ref 0–0.5)
LYMPHOCYTES # BLD: 2.3 K/UL (ref 0.8–3.5)
LYMPHOCYTES NFR BLD: 26 % (ref 12–49)
MCH RBC QN AUTO: 27.6 PG (ref 26–34)
MCHC RBC AUTO-ENTMCNC: 31 G/DL (ref 30–36.5)
MCV RBC AUTO: 89.1 FL (ref 80–99)
MONOCYTES # BLD: 0.7 K/UL (ref 0–1)
MONOCYTES NFR BLD: 8 % (ref 5–13)
NEUTS SEG # BLD: 5.8 K/UL (ref 1.8–8)
NEUTS SEG NFR BLD: 65 % (ref 32–75)
NRBC # BLD: 0 K/UL (ref 0–0.01)
NRBC BLD-RTO: 0 PER 100 WBC
PLATELET # BLD AUTO: 226 K/UL (ref 150–400)
PMV BLD AUTO: 11 FL (ref 8.9–12.9)
POTASSIUM SERPL-SCNC: 3.8 MMOL/L (ref 3.5–5.1)
PROT SERPL-MCNC: 6.9 G/DL (ref 6.4–8.2)
RBC # BLD AUTO: 4.67 M/UL (ref 3.8–5.2)
SODIUM SERPL-SCNC: 142 MMOL/L (ref 136–145)
WBC # BLD AUTO: 9 K/UL (ref 3.6–11)

## 2022-10-05 PROCEDURE — 99285 EMERGENCY DEPT VISIT HI MDM: CPT

## 2022-10-05 PROCEDURE — 96374 THER/PROPH/DIAG INJ IV PUSH: CPT

## 2022-10-05 PROCEDURE — 36415 COLL VENOUS BLD VENIPUNCTURE: CPT

## 2022-10-05 PROCEDURE — 74011250636 HC RX REV CODE- 250/636: Performed by: STUDENT IN AN ORGANIZED HEALTH CARE EDUCATION/TRAINING PROGRAM

## 2022-10-05 PROCEDURE — 74011000636 HC RX REV CODE- 636: Performed by: STUDENT IN AN ORGANIZED HEALTH CARE EDUCATION/TRAINING PROGRAM

## 2022-10-05 PROCEDURE — 74011250637 HC RX REV CODE- 250/637: Performed by: STUDENT IN AN ORGANIZED HEALTH CARE EDUCATION/TRAINING PROGRAM

## 2022-10-05 PROCEDURE — 80053 COMPREHEN METABOLIC PANEL: CPT

## 2022-10-05 PROCEDURE — 70496 CT ANGIOGRAPHY HEAD: CPT

## 2022-10-05 PROCEDURE — 85025 COMPLETE CBC W/AUTO DIFF WBC: CPT

## 2022-10-05 PROCEDURE — 70450 CT HEAD/BRAIN W/O DYE: CPT

## 2022-10-05 RX ORDER — ACETAMINOPHEN 325 MG/1
975 TABLET ORAL
Status: COMPLETED | OUTPATIENT
Start: 2022-10-05 | End: 2022-10-05

## 2022-10-05 RX ORDER — KETOROLAC TROMETHAMINE 30 MG/ML
30 INJECTION, SOLUTION INTRAMUSCULAR; INTRAVENOUS
Status: DISCONTINUED | OUTPATIENT
Start: 2022-10-05 | End: 2022-10-06 | Stop reason: HOSPADM

## 2022-10-05 RX ORDER — ONDANSETRON 2 MG/ML
4 INJECTION INTRAMUSCULAR; INTRAVENOUS
Status: COMPLETED | OUTPATIENT
Start: 2022-10-05 | End: 2022-10-05

## 2022-10-05 RX ADMIN — IOPAMIDOL 100 ML: 755 INJECTION, SOLUTION INTRAVENOUS at 22:32

## 2022-10-05 RX ADMIN — ONDANSETRON 4 MG: 2 INJECTION INTRAMUSCULAR; INTRAVENOUS at 21:12

## 2022-10-05 RX ADMIN — ACETAMINOPHEN 975 MG: 325 TABLET ORAL at 20:58

## 2022-10-05 NOTE — Clinical Note
[address]  [facility] EMERGENCY DEPT  [address]  [place] [address]  [phone]    Work/School Note    Date: 10/5/2022    To Whom It May concern:      Seng Geiger was seen and treated today in the emergency room by the following provider(s):  Attending Provider: Corie Montero MD.      Seng Geiger is excused from work/school on 10/05/22. She is clear to return to work/school on 10/06/22.         Sincerely,          Edvin Calloway MD

## 2022-10-05 NOTE — Clinical Note
[address]  [facility] EMERGENCY DEPT  [address]  [place] [address]  [phone]    Work/School Note    Date: 10/5/2022    To Whom It May concern:      Carine Samaniego was seen and treated today in the emergency room by the following provider(s):  Attending Provider: Malgorzata Anderson MD.      Carine Samaniego is excused from work/school on 10/05/22. She is clear to return to work/school on 10/06/22.         Sincerely,          Galileo Goncalves MD

## 2022-10-06 VITALS
OXYGEN SATURATION: 98 % | DIASTOLIC BLOOD PRESSURE: 97 MMHG | HEIGHT: 61 IN | SYSTOLIC BLOOD PRESSURE: 152 MMHG | WEIGHT: 245.15 LBS | HEART RATE: 65 BPM | RESPIRATION RATE: 18 BRPM | TEMPERATURE: 98.2 F | BODY MASS INDEX: 46.29 KG/M2

## 2022-10-06 NOTE — ED PROVIDER NOTES
EMERGENCY DEPARTMENT HISTORY AND PHYSICAL EXAM      Date: 10/5/2022  Patient Name: Jane Randhawa    History of Presenting Illness     Chief Complaint   Patient presents with    Headache     Reports new onset of 10/10 headache, nausea, and black spots in vision since around 3 pm. Hx of cerebral tumor and migraines. Denied any numbness or one sided weakness. History Provided By: Patient    HPI: Jane Randhawa, 48 y.o. female with a past medical history significant for ?cerebral tumor (self-reported) and migraines presents to the ED with cc of acute onset headache that started when she was dirving. She reported that she felt like something popped on her left side of her head. Occurred just prior to arrival.  Has associated vision changes with spots in her vision, nausea. No numbess, tingling, or weakness. Had some associated neck pain - pain of her head was generally all over and severe. She spoke to her neurologist who recommend that she be evaluated in the ED. Reports current symptoms are more severe than her previous migraines. Unfortunately patient had a wait in the waiting room and requested to leave multiple times before CT was able to be performed, but we were able to convince her to stay for evaluation. There are no associated symptoms. No other exacerbating or ameliorating factors. PCP: Miguel Angel Tucker MD    No current facility-administered medications on file prior to encounter.      Current Outpatient Medications on File Prior to Encounter   Medication Sig Dispense Refill    QUEtiapine (SEROquel) 50 mg tablet TAKE 1 TABLET BY MOUTH EVERY NIGHT 90 Tablet 3    QUEtiapine (SEROquel) 100 mg tablet TAKE 1 TABLET BY MOUTH EVERY DAY AT BEDTIME 30 Tablet 5    traZODone (DESYREL) 100 mg tablet TAKE 1 TABLET BY MOUTH EVERY NIGHT 90 Tablet 3    busPIRone (BUSPAR) 15 mg tablet TAKE 1 TABLET BY MOUTH THREE TIMES DAILY 270 Tablet 3    acetaZOLAMIDE SR (DIAMOX) 500 mg capsule TAKE 1 CAPSULE BY MOUTH TWICE DAILY 180 Capsule 3    metFORMIN (GLUCOPHAGE) 1,000 mg tablet TAKE 1 TABLET BY MOUTH TWICE DAILY WITH THE MORNING AND EVENING MEAL 240 Tablet 1    pregabalin (LYRICA) 200 mg capsule TAKE 1 CAPSULE BY MOUTH TWICE DAILY. MAX DAILY AMOUNT: 400  Capsule 5    QUEtiapine (SEROquel) 50 mg tablet TAKE 1 TABLET BY MOUTH EVERY NIGHT 90 Tablet 3    amoxicillin (AMOXIL) 875 mg tablet Take 875 mg by mouth every twelve (12) hours. Orilissa 200 mg tab Take 1 Tablet by mouth two (2) times a day. EPINEPHrine (EPIPEN) 0.3 mg/0.3 mL injection ADMINISTER 0.3 ML IN THE MUSCLE 1 TIME AS NEEDED FOR ANAPHYLAXIS      rosuvastatin (CRESTOR) 20 mg tablet Take 20 mg by mouth daily. tamsulosin (FLOMAX) 0.4 mg capsule TAKE 1 CAPSULE BY MOUTH EVERY DAY 30 MINUTES AFTER THE SAME MEAL      onabotulinumtoxinA (Botox) 200 unit injection 155 units by IM to 31 approved sites into head and neck every 12 weeks for chronic migraine. Indications: migraine prevention 200 Units 3    predniSONE (STERAPRED) 5 mg dose pack See administration instruction per 5mg dose pack 21 Tablet 0    eletriptan (RELPAX) 40 mg tablet Take 1 Tablet by mouth daily as needed (migraine. May repeat in 40 minutes no more than 2 in 24 hours). 36 Tablet 1    zonisamide (ZONEGRAN) 100 mg capsule TAKE 2 CAPSULES BY MOUTH TWICE DAILY 360 Capsule 1    erenumab-aooe (Aimovig Autoinjector) 140 mg/mL injection 1 mL by SubCUTAneous route every thirty (30) days. 1 Each 5    traZODone (DESYREL) 50 mg tablet Take 50 mg by mouth nightly. ubrogepant Pearla Dundee) 100 mg tablet Take 1 tab p.o. prn for severe  Headache , not to exceed 1 dose in 24 hrs 16 Tablet 11    clonazePAM (KlonoPIN) 1 mg tablet Take 1 Tablet by mouth nightly. Max Daily Amount: 1 mg. 30 Tablet 2    butalbital-acetaminophen-caff (Fioricet) -40 mg per capsule Take 1 Capsule by mouth every four (4) hours as needed for Headache.  10 Capsule 0    acyclovir (ZOVIRAX) 400 mg tablet Take 400 mg by mouth as needed. diclofenac EC (VOLTAREN) 75 mg EC tablet diclofenac sodium 75 mg tablet,delayed release   TK 1 T PO BID      butalbitaL-acetaminop-caf-cod -41-30 mg cap butalbital 50 mg-acetaminophen 300 mg-caffeine 40 mg-codeine 30 mg cap   Take 1 capsule every 4 hours by oral route.      etodolac (LODINE) 400 mg tablet etodolac 400 mg tablet   TAKE 1 TABLET BY MOUTH TWICE DAILY      magnesium oxide 250 mg magnesium tablet magnesium 250 mg (as magnesium oxide) tablet   TK 1 T PO BID      pantoprazole (PROTONIX) 40 mg tablet Take 40 mg by mouth daily. prochlorperazine (COMPAZINE) 10 mg tablet prochlorperazine maleate 10 mg tablet   TAKE 1 TABLET BY MOUTH THREE TIMES DAILY      promethazine (PHENERGAN) 25 mg tablet Take 25 mg by mouth every eight (8) hours as needed. metaxalone (SKELAXIN) 800 mg tablet Take 1 Tablet by mouth three (3) times daily. 270 Tablet 3    atenoloL (TENORMIN) 50 mg tablet Take 1 Tablet by mouth two (2) times a day. 180 Tablet 3    atorvastatin (LIPITOR) 20 mg tablet Take 1 Tablet by mouth daily. 90 Tablet 3    cyanocobalamin (VITAMIN B12) 1,000 mcg/mL injection 1,000 mcg by IntraMUSCular route every Sunday. albuterol (PROVENTIL HFA, VENTOLIN HFA, PROAIR HFA) 90 mcg/actuation inhaler Take 1 Puff by inhalation every six (6) hours as needed for Wheezing. therapeutic multivitamin (THERAGRAN) tablet Take 1 Tablet by mouth daily. ascorbic acid, vitamin C, (VITAMIN C) 500 mg tablet Take 500 mg by mouth daily. cholecalciferol (VITAMIN D3) (1000 Units /25 mcg) tablet Take 1,000 Units by mouth daily. ondansetron hcl (ZOFRAN) 4 mg tablet TAKE 1 TABLET BY MOUTH DAILY AS NEEDED FOR NAUSEA OR VOMITING 90 Tablet 3    tiZANidine (ZANAFLEX) 4 mg tablet Take one in the day and two at night 90 Tablet 3    omeprazole (PRILOSEC) 20 mg capsule Take 20 mg by mouth daily.       fluticasone propion-salmeteroL (ADVAIR/WIXELA) 250-50 mcg/dose diskus inhaler Take 1 Puff by inhalation every twelve (12) hours. diphenhydrAMINE (BENADRYL) 25 mg capsule Take 25 mg by mouth as needed. Indications: Allerig[employer] to [employer] - only takes with evette         Past History     Past Medical History:  Past Medical History:   Diagnosis Date    Asthma     Diabetes ([address]     Fibromyalgia     Hypertension     Lower back injury 02/13/2017    Migraines     Nausea and vomiting 1/14/2014    Other ill-defined conditions(799.89)     cerebral tumor, endometriosis, fibromyalgia, herpes    Right lower quadrant abdominal mass 1/14/2014    Seizures (HCC)     Sleep apnea     intolerant to CPAP    Unspecified adverse effect of anesthesia     pt states that she has an asthma attack when coming out of  anesthesia       Past Surgical History:  Past Surgical History:   Procedure Laterality Date    HX CHOLECYSTECTOMY      HX HEENT      wisdom teeth    HX OTHER SURGICAL      laproscopy on ovaries    HX OTHER SURGICAL      left wrist ganglion cystectomy    HX OTHER SURGICAL      lung mass removed    HX TOTAL COLECTOMY  3/14/2013    Rt colectomy for perf transverse diverticulum       Family History:  Family History   Problem Relation Age of Onset    Other Other         unable to obtain due to AMS    Hypertension Father     Heart Disease Father     Cancer Father     Elevated Lipids Father     Hypertension Mother     Diabetes Maternal Grandmother     Diabetes Maternal Grandfather     Lupus Sister     Hypertension Sister     Elevated Lipids Sister        Social History:  Social History     Tobacco Use    Smoking status: Never    Smokeless tobacco: Never    Tobacco comments:     vapor   Vaping Use    Vaping Use: Never used   Substance Use Topics    Alcohol use: Not Currently     Comment: ocasionally       Allergies:   Allergies   Allergen Reactions    Latex Other (comments)    Fentanyl Shortness of Breath     Other reaction(s): wheezing/sob  Other reaction(s): wheezing/sob  Other reaction(s): wheezing/sob      Lemon Anaphylaxis     Other reaction(s): anaphylaxis/angioedema    Adhesive Tape-Silicones Other (comments)     Pt reports it burns her skin    Dilaudid [Hydromorphone (Pf)] Itching    Percocet [Oxycodone-Acetaminophen] Nausea and Vomiting     Pt states not allergic! Review of Systems   Review of Systems   Constitutional:  Negative for activity change, chills and fever. HENT:  Negative for sore throat. Eyes:  Positive for photophobia and visual disturbance. Respiratory:  Negative for shortness of breath. Cardiovascular:  Negative for chest pain. Gastrointestinal:  Positive for nausea. Negative for abdominal pain and vomiting. Genitourinary:  Negative for difficulty urinating, dysuria and hematuria. Musculoskeletal:  Negative for myalgias. Skin:  Negative for color change. Neurological:  Positive for headaches. Negative for dizziness and weakness. Psychiatric/Behavioral:  Negative for agitation. Physical Exam   Physical Exam  Constitutional:       Appearance: Normal appearance. She is obese. HENT:      Head: Normocephalic and atraumatic. Mouth/Throat:      Mouth: Mucous membranes are moist.   Eyes:      General: No scleral icterus. Pupils: Pupils are equal, round, and reactive to light. Comments: No APD  +photophobia   Cardiovascular:      Rate and Rhythm: Normal rate and regular rhythm. Heart sounds: Normal heart sounds. Pulmonary:      Effort: Pulmonary effort is normal.      Breath sounds: Normal breath sounds. Abdominal:      General: Abdomen is flat. Bowel sounds are normal.   Musculoskeletal:         General: Normal range of motion. Cervical back: Normal range of motion and neck supple. No rigidity. Skin:     General: Skin is warm and dry. Capillary Refill: Capillary refill takes less than 2 seconds. Neurological:      General: No focal deficit present. Mental Status: She is alert and oriented to person, place, and time.       Cranial Nerves: No cranial nerve deficit. Sensory: No sensory deficit. Motor: No weakness. Psychiatric:         Mood and Affect: Mood normal.       Diagnostic Study Results     Labs -     Recent Results (from the past 24 hour(s))   CBC WITH AUTOMATED DIFF    Collection Time: 10/05/22  5:53 PM   Result Value Ref Range    WBC 9.0 3.6 - 11.0 K/uL    RBC 4.67 3.80 - 5.20 M/uL    HGB 12.9 11.5 - 16.0 g/dL    HCT 41.6 35.0 - 47.0 %    MCV 89.1 80.0 - 99.0 FL    MCH 27.6 26.0 - 34.0 PG    MCHC 31.0 30.0 - 36.5 g/dL    RDW 15.2 (H) 11.5 - 14.5 %    PLATELET 008 190 - 765 K/uL    MPV 11.0 8.9 - 12.9 FL    NRBC 0.0 0  WBC    ABSOLUTE NRBC 0.00 0.00 - 0.01 K/uL    NEUTROPHILS 65 32 - 75 %    LYMPHOCYTES 26 12 - 49 %    MONOCYTES 8 5 - 13 %    EOSINOPHILS 1 0 - 7 %    BASOPHILS 0 0 - 1 %    IMMATURE GRANULOCYTES 0 0.0 - 0.5 %    ABS. NEUTROPHILS 5.8 1.8 - 8.0 K/UL    ABS. LYMPHOCYTES 2.3 0.8 - 3.5 K/UL    ABS. MONOCYTES 0.7 0.0 - 1.0 K/UL    ABS. EOSINOPHILS 0.1 0.0 - 0.4 K/UL    ABS. BASOPHILS 0.0 0.0 - 0.1 K/UL    ABS. IMM. GRANS. 0.0 0.00 - 0.04 K/UL    DF AUTOMATED     METABOLIC PANEL, COMPREHENSIVE    Collection Time: 10/05/22  8:36 PM   Result Value Ref Range    Sodium 142 136 - 145 mmol/L    Potassium 3.8 3.5 - 5.1 mmol/L    Chloride 112 (H) 97 - 108 mmol/L    CO2 25 21 - 32 mmol/L    Anion gap 5 5 - 15 mmol/L    Glucose 105 (H) 65 - 100 mg/dL    BUN 13 6 - 20 MG/DL    Creatinine 0.79 0.55 - 1.02 MG/DL    BUN/Creatinine ratio 16 12 - 20      eGFR >60 >60 ml/min/1.73m2    Calcium 9.0 8.5 - 10.1 MG/DL    Bilirubin, total 0.3 0.2 - 1.0 MG/DL    ALT (SGPT) 20 12 - 78 U/L    AST (SGOT) 21 15 - 37 U/L    Alk. phosphatase 120 (H) 45 - 117 U/L    Protein, total 6.9 6.4 - 8.2 g/dL    Albumin 3.1 (L) 3.5 - 5.0 g/dL    Globulin 3.8 2.0 - 4.0 g/dL    A-G Ratio 0.8 (L) 1.1 - 2.2         Radiologic Studies -   CT HEAD WO CONT   Final Result      No acute intracranial abnormality.          CTA HEAD NECK W CONT   Final Result There is no evidence of aneurysm, dissection or hemodynamically significant   stenosis. .      There is no intracranial mass, hemorrhage or evidence of acute infarction. No acute intracranial process is identified. .         CT Results  (Last 48 hours)                 10/05/22 2232  CT HEAD WO CONT Final result    Impression:      No acute intracranial abnormality. Narrative:  EXAM:  CT head without contrast       INDICATION: Headache       COMPARISON: MRI 4/27/2021. CT 10/5/2018. TECHNIQUE: Noncontrast head CT. Coronal and sagittal reformats. CT dose   reduction was achieved through use of a standardized protocol tailored for this   examination and automatic exposure control for dose modulation. FINDINGS: The ventricles and sulci are age-appropriate without hydrocephalus. There is no mass effect or midline shift. There is no intracranial hemorrhage or   extra-axial fluid collection. There is no abnormal parenchymal attenuation. The   gray-white matter differentiation is maintained. The basal cisterns are patent. The osseous structures are intact. The visualized paranasal sinuses and mastoid   air cells are clear. 10/05/22 2232  CTA HEAD NECK W CONT Final result    Impression:  There is no evidence of aneurysm, dissection or hemodynamically significant   stenosis. .       There is no intracranial mass, hemorrhage or evidence of acute infarction. No acute intracranial process is identified. .        Narrative:  CLINICAL HISTORY: headache   INDICATION: headache       COMPARISON: 10/5/2022. 4/27/2021]       CONTRAST: 100 ml Isovue-370       TECHNIQUE:  Unenhanced  images were obtained to localize the volume for   acquisition. Multislice helical axial CT angiography was performed from the   aortic arch to the top of the head during uneventful rapid bolus intravenous   contrast administration.    Coronal and sagittal reformations and 3D post   processing was performed. CT dose reduction was achieved through use of a   standardized protocol tailored for this examination and automatic exposure   control for dose modulation. Examination was evaluated utilizing the viz. AI algorithm. FINDINGS:   CTA NECK   There is motion artifact limits examination. Patient habitus also limits exam..   The bilateral subclavian, common carotid, and internal carotid arteries are   patent with no flow-limiting stenosis. % of right carotid artery stenosis: 0   % of left carotid artery stenosis: 0   Measurements utilizing NASCET criteria. NASCET method was utilized for calculating stenosis. There is a retropharyngeal course of the common carotid and internal carotid   arteries. The vertebrobasilar system is diminutive in size. . The cervical soft   tissues are unremarkable. There are degenerative changes of the cervical spine. CTA HEAD   Diminutive vertebral arteries. Left vertebral artery slightly larger than the   right vertebral artery. A1 segments are present bilaterally. Lawernce Roughen Lawernce Roughen A 2 segments are patent. Symmetric arborization of M2 vessels is demonstrated. The basilar artery is patent. . The M1 segments are patent bilaterally. .The   posterior cerebral arteries on the right and on the left are patent. .  There is   no aneurysm. There are no sizable posterior communicating arteries. No evidence of acute intracranial hemorrhage or midline shift is demonstrated. Very small hypodense foci in the basal ganglia on the right and on the left and   corresponding imaging findings demonstrated on brain . Stable. CXR Results  (Last 48 hours)      None              Medical Decision Making   I am the first provider for this patient. I reviewed the vital signs, available nursing notes, past medical history, past surgical history, family history and social history. Vital Signs-Reviewed the patient's vital signs.   Patient Vitals for the past 12 hrs:   Temp Pulse Resp BP SpO2   10/05/22 2300 -- 65 18 -- 98 %   10/05/22 2145 -- 72 17 -- 98 %   10/05/22 2142 -- 76 20 -- 100 %   10/05/22 2130 -- 65 17 (!) 152/97 98 %   10/05/22 2115 -- 68 15 134/62 97 %   10/05/22 2100 -- -- -- -- 99 %   10/05/22 2058 -- 69 19 -- 99 %   10/05/22 2045 -- 69 15 (!) 160/52 100 %   10/05/22 1834 -- 84 18 (!) 152/92 98 %   10/05/22 1707 98.2 °F (36.8 °C) 69 18 (!) 150/84 100 %       Records Reviewed: Nursing records and medical records reviewed    Provider Notes (Medical Decision Making):   Patient presented to the ED with concerning hx of ICH or SAH. CT head and CTA head/neck obtained which did not show any acute abnormality. Patient's headache initially treated with PO tylenol and her sx improved some. She was also given IV toradol prior to discharge. She did not want to stay for further headache cocktails. Doubt meningitis with no fever. Doubt CVT. Patient with long history of migraines in her chart and I suspect she is having one currently. Pseudotumor cerebri hx noted in previous neurology notes, but no other cerebral tumor    ED Course:   Initial assessment performed. The patients presenting problems have been discussed, and they are in agreement with the care plan formulated and outlined with them. I have encouraged them to ask questions as they arise throughout their visit. ED Course as of 10/06/22 0137   Wed Oct 05, 2022   2308 Repeat neuro exam normal.  Patient states that she still has a headache, but she would like to go home as time. CTA and CT head are negative. With timeline of symptoms and time to obtain imaging I do not think she needs further workup for [facility]. No meningismus on my repeat exam.  Offered additional medications to treat headache if IV toradol is unsuccessful, but patient declined and requested discharge. Patient stable. Discussed strict return precautions and she voiced understanding.   Patient will call her neurologist office in the morning to schedule follow-up appointment. [WB]      ED Course User Index  [WB] Lincoln Reynolds MD       Medications Administered       acetaminophen (TYLENOL) tablet 975 mg       Admin Date  10/05/2022 Action  Given Dose  975 mg Route  Oral Administered By  Asha Thorpe RN              iopamidoL (ISOVUE-370) 76 % injection 100 mL       Admin Date  10/05/2022 Action  Given Dose  100 mL Route  IntraVENous Administered By  Brendapreston Hernández              ondansetron [facility]) injection 4 mg       Admin Date  10/05/2022 Action  Given Dose  4 mg Route  IntraVENous Administered By  Asha Thorpe RN                  Critical Care:  None      Disposition:  Home    DISCHARGE PLAN:  1. Discharge Medication List as of 10/5/2022 11:25 PM        2. Follow-up Information       Follow up With Specialties Details Why Contact Info    Ysabel Méndez MD Family Medicine Schedule an appointment as soon as possible for a visit   [address] EMERGENCY DEPT Emergency Medicine  As needed, If symptoms worsen [address]    Jayne Em MD Neurology In 1 day  [address]            3. Return to ED if worse     Diagnosis     Clinical Impression:   1. Acute intractable headache, unspecified headache type        Attestations:    Yariel Aguirre MD    Please note that this dictation was completed with MMIS, the computer voice recognition software. Quite often unanticipated grammatical, syntax, homophones, and other interpretive errors are inadvertently transcribed by the computer software. Please disregard these errors. Please excuse any errors that have escaped final proofreading. Thank you.

## 2022-10-06 NOTE — ED NOTES
Report received from [facility]. They advised of the patient's chief complaint, current status, orders completed (to include IV access/medications/radiology testing), outstanding orders that still need to be completed, and the treatment plan. Questions asked and answered prior to assumption of care.

## 2022-10-13 ENCOUNTER — TELEPHONE (OUTPATIENT)
Dept: NEUROLOGY | Age: 47
End: 2022-10-13

## 2022-10-13 NOTE — TELEPHONE ENCOUNTER
Botox  sent to [facility] via Cover  Meds ([facility]) Key: [id number] - [place] Case ID: [id number]    With office notes - Status is pending.      Appt is 10/19

## 2022-10-17 ENCOUNTER — TELEPHONE (OUTPATIENT)
Dept: NEUROLOGY | Age: 47
End: 2022-10-17

## 2022-10-17 DIAGNOSIS — G43.711 INTRACTABLE CHRONIC MIGRAINE WITHOUT AURA AND WITH STATUS MIGRAINOSUS: ICD-10-CM

## 2022-10-17 RX ORDER — ONABOTULINUMTOXINA 200 [USP'U]/1
INJECTION, POWDER, LYOPHILIZED, FOR SOLUTION INTRADERMAL; INTRAMUSCULAR
Qty: 200 UNITS | Refills: 3 | Status: SHIPPED | OUTPATIENT
Start: 2022-10-17

## 2022-10-17 NOTE — TELEPHONE ENCOUNTER
Requested Prescriptions     Pending Prescriptions Disp Refills    onabotulinumtoxinA (Botox) 200 unit injection 200 Units 3     Si units by IM to 31 approved sites into head and neck every 12 weeks for chronic migraine.   Indications: migraine prevention     Last fill 22 Alan needs to go to [facility]    Last visit 22    Next visit 10/19/22 for Botox

## 2022-10-17 NOTE — TELEPHONE ENCOUNTER
Botox is approved    Auth good to 1/13/23. Your patient may now fill this prescription and it will be covered. Needs to be escribed to [facility] - I added it in her list of pharmacies(it shows [facility] - which is now [facility])     [address] [facility]    [phone]    Faxed Rx, Auth letter to [facility] at [phone] - asked to [facility]  - approval and Rx scanned to Chart/ in Media.

## 2022-10-17 NOTE — TELEPHONE ENCOUNTER
Spoke with Av Geiger with [address]    [phone] Patient Co-pay $499.00 the patient will need assistance with this cost unable to ship at this time. The [facility] will let us know when it can be shipped.  ZANDRA

## 2022-10-18 NOTE — TELEPHONE ENCOUNTER
Lashon,     This is a commercial account and is eligible for the Botox digital savings card program.  Have the patient call [phone] or she can register online for the savings card and then she can call [facility] to give them that information.  They will pay up to $1,000 per procedure (4K a year) and [facility] should have helped patient with this program.

## 2022-10-18 NOTE — TELEPHONE ENCOUNTER
Patient informed # given to call for botox savings card he procedure is cancel for 10/19/22 at this time if able to get assistance cant try for 11/16/22

## 2022-10-26 ENCOUNTER — OFFICE VISIT (OUTPATIENT)
Dept: NEUROLOGY | Age: 47
End: 2022-10-26
Payer: COMMERCIAL

## 2022-10-26 VITALS — SYSTOLIC BLOOD PRESSURE: 137 MMHG | DIASTOLIC BLOOD PRESSURE: 79 MMHG

## 2022-10-26 DIAGNOSIS — G43.019 INTRACTABLE MIGRAINE WITHOUT AURA AND WITHOUT STATUS MIGRAINOSUS: ICD-10-CM

## 2022-10-26 DIAGNOSIS — G93.2 PSEUDOTUMOR CEREBRI: ICD-10-CM

## 2022-10-26 DIAGNOSIS — G93.2 IIH (IDIOPATHIC INTRACRANIAL HYPERTENSION): ICD-10-CM

## 2022-10-26 DIAGNOSIS — G43.711 INTRACTABLE CHRONIC MIGRAINE WITHOUT AURA AND WITH STATUS MIGRAINOSUS: Primary | ICD-10-CM

## 2022-10-26 DIAGNOSIS — G43.109 COMPLICATED MIGRAINE: ICD-10-CM

## 2022-10-26 DIAGNOSIS — M79.7 FIBROMYALGIA: ICD-10-CM

## 2022-10-26 DIAGNOSIS — G25.0 TREMOR, ESSENTIAL: ICD-10-CM

## 2022-10-26 DIAGNOSIS — G40.209 PARTIAL SYMPTOMATIC EPILEPSY WITH COMPLEX PARTIAL SEIZURES, NOT INTRACTABLE, WITHOUT STATUS EPILEPTICUS (HCC): ICD-10-CM

## 2022-10-26 DIAGNOSIS — F41.9 ANXIETY: ICD-10-CM

## 2022-10-26 PROCEDURE — 3078F DIAST BP <80 MM HG: CPT | Performed by: PSYCHIATRY & NEUROLOGY

## 2022-10-26 PROCEDURE — 3074F SYST BP LT 130 MM HG: CPT | Performed by: PSYCHIATRY & NEUROLOGY

## 2022-10-26 PROCEDURE — 99214 OFFICE O/P EST MOD 30 MIN: CPT | Performed by: PSYCHIATRY & NEUROLOGY

## 2022-10-26 RX ORDER — ACETAZOLAMIDE 500 MG/1
500 CAPSULE, EXTENDED RELEASE ORAL 2 TIMES DAILY
Qty: 180 CAPSULE | Refills: 3 | Status: SHIPPED | OUTPATIENT
Start: 2022-10-26

## 2022-10-26 RX ORDER — PREDNISONE 5 MG/1
TABLET ORAL
Qty: 21 TABLET | Refills: 0 | Status: SHIPPED | OUTPATIENT
Start: 2022-10-26

## 2022-10-26 RX ORDER — BUSPIRONE HYDROCHLORIDE 15 MG/1
15 TABLET ORAL 3 TIMES DAILY
Qty: 270 TABLET | Refills: 3 | Status: SHIPPED | OUTPATIENT
Start: 2022-10-26

## 2022-10-26 RX ORDER — TRAZODONE HYDROCHLORIDE 50 MG/1
50 TABLET ORAL
Qty: 30 TABLET | Refills: 11 | Status: SHIPPED | OUTPATIENT
Start: 2022-10-26

## 2022-10-26 RX ORDER — TIZANIDINE 4 MG/1
TABLET ORAL
Qty: 90 TABLET | Refills: 3 | Status: SHIPPED | OUTPATIENT
Start: 2022-10-26

## 2022-10-26 RX ORDER — ZONISAMIDE 100 MG/1
CAPSULE ORAL
Qty: 360 CAPSULE | Refills: 3 | Status: SHIPPED | OUTPATIENT
Start: 2022-10-26

## 2022-10-26 NOTE — PROGRESS NOTES
Having conf state at tike not rem how get to some  Daily headache  Throwing blood  Dec napoleon  Cofn  Tremor  Tenderness  Fruent fall

## 2022-10-26 NOTE — PROGRESS NOTES
Chief Complaint   Patient presents with    Follow-up    Migraine    1. Have you been to the ER, urgent care clinic since your last visit? Yes  Hospitalized since your last visit? no    2. Have you seen or consulted any other health care providers outside of the [address] [place] [address] since your last visit? Include any pap smears or colon screening. Patient received a paper copy of weaning schedule for klonopin (Medications). Patient was instructed that Dr. Em Moctezuma will be exiting the ministry on November 30, 2022. Patient was also given a paper copy of alternative locations that they can contact, concerning their pain management needs, as [facility] will not be prescribing these medications.

## 2022-10-27 ENCOUNTER — OFFICE VISIT (OUTPATIENT)
Dept: NEUROLOGY | Age: 47
End: 2022-10-27
Payer: COMMERCIAL

## 2022-10-27 VITALS
DIASTOLIC BLOOD PRESSURE: 72 MMHG | HEART RATE: 70 BPM | RESPIRATION RATE: 18 BRPM | TEMPERATURE: 98 F | SYSTOLIC BLOOD PRESSURE: 135 MMHG

## 2022-10-27 DIAGNOSIS — G43.019 INTRACTABLE MIGRAINE WITHOUT AURA AND WITHOUT STATUS MIGRAINOSUS: ICD-10-CM

## 2022-10-27 DIAGNOSIS — G43.109 COMPLICATED MIGRAINE: ICD-10-CM

## 2022-10-27 DIAGNOSIS — G43.711 INTRACTABLE CHRONIC MIGRAINE WITHOUT AURA AND WITH STATUS MIGRAINOSUS: Primary | ICD-10-CM

## 2022-10-27 NOTE — PROGRESS NOTES
Chief Complaint   Patient presents with    Follow-up    1. Have you been to the ER, urgent care clinic since your last visit? Hospitalized since your last visit? 2. Have you seen or consulted any other health care providers outside of the [address] [place] [address] since your last visit? Include any pap smears or colon screening. Patient received a paper copy of weaning schedule for klonopin (Medications). Patient was instructed that Dr. Kim Fallon will be exiting the ministry on November 30, 2022. Patient was also given a paper copy of alternative locations that they can contact, concerning their pain management needs, as [facility] will not be prescribing these medications.

## 2022-10-27 NOTE — PROGRESS NOTES
Neurology Progress Note    NAME:  Melisa Brown   :   1975   MRN:   [id number]     Date/Time:  10/27/2022  Subjective:      Melisa Brown is a 50 y.o. female here today for trigger point injection  Patient says she still having nearly daily headache, more than 20 migraines in a month. There is associated neck pain  Headache associated with dizziness, blurry vision, nausea, photophobia, phonophobia. She says she experiences occasional confusion and loss of consciousness mostly with headache. She says photopsia is new. Headache has been going on for more than 30 years, she has been on the following medication without much help:  Depakote  Zonegran  Verapamil  Elavil  Pamelor  Botox injection  Cymbalta  Neurontin  Lyrica  Patient is currently on CGRP-Aimovig/Ubrelvy and also occasionally receive trigger point injection. She noted that medications tend  to take the edge off but the headache is still there. Procedure:  After consent was obtained, using sterile technique the trigger point was prepped. Local anesthetic used: 2% lidocaine. Dexamethasone 4 mg, Toradol 30 mg and was then injected and the needle withdrawn. The procedure was well tolerated. The patient is asked to continue to rest the area for a few more days before resuming regular activities. It may be more painful for the first 1-2 days. Watch for fever, or increased swelling or persistent pain in the joint. Call or return to clinic prn if such symptoms occur or there is failure to improve as anticipated. Medications reviewed:  Current Outpatient Medications   Medication Sig Dispense Refill    rimegepant (NURTEC) 75 mg disintegrating tablet Take 1 tab every other day , then 1 tab prn for severe headache, not exceed 1 dose in 24 hrs 48 Tablet 4    busPIRone (BUSPAR) 15 mg tablet Take 1 Tablet by mouth three (3) times daily.  270 Tablet 3    zonisamide (ZONEGRAN) 100 mg capsule TAKE 2 CAPSULES BY MOUTH TWICE DAILY 360 Capsule 3 acetaZOLAMIDE SR (DIAMOX) 500 mg capsule Take 1 Capsule by mouth two (2) times a day. 180 Capsule 3    tiZANidine (ZANAFLEX) 4 mg tablet Take one in the day and two at night 90 Tablet 3    traZODone (DESYREL) 50 mg tablet Take 1 Tablet by mouth nightly. 30 Tablet 11    predniSONE (STERAPRED) 5 mg dose pack See administration instruction per 5mg dose pack 21 Tablet 0    onabotulinumtoxinA (Botox) 200 unit injection 155 units by IM to 31 approved sites into head and neck every 12 weeks for chronic migraine. Indications: migraine prevention 200 Units 3    QUEtiapine (SEROquel) 50 mg tablet TAKE 1 TABLET BY MOUTH EVERY NIGHT 90 Tablet 3    QUEtiapine (SEROquel) 100 mg tablet TAKE 1 TABLET BY MOUTH EVERY DAY AT BEDTIME 30 Tablet 5    traZODone (DESYREL) 100 mg tablet TAKE 1 TABLET BY MOUTH EVERY NIGHT 90 Tablet 3    metFORMIN (GLUCOPHAGE) 1,000 mg tablet TAKE 1 TABLET BY MOUTH TWICE DAILY WITH THE MORNING AND EVENING MEAL 240 Tablet 1    pregabalin (LYRICA) 200 mg capsule TAKE 1 CAPSULE BY MOUTH TWICE DAILY. MAX DAILY AMOUNT: 400  Capsule 5    QUEtiapine (SEROquel) 50 mg tablet TAKE 1 TABLET BY MOUTH EVERY NIGHT 90 Tablet 3    Orilissa 200 mg tab Take 1 Tablet by mouth two (2) times a day. EPINEPHrine (EPIPEN) 0.3 mg/0.3 mL injection ADMINISTER 0.3 ML IN THE MUSCLE 1 TIME AS NEEDED FOR ANAPHYLAXIS      rosuvastatin (CRESTOR) 20 mg tablet Take 20 mg by mouth daily. tamsulosin (FLOMAX) 0.4 mg capsule TAKE 1 CAPSULE BY MOUTH EVERY DAY 30 MINUTES AFTER THE SAME MEAL      eletriptan (RELPAX) 40 mg tablet Take 1 Tablet by mouth daily as needed (migraine. May repeat in 40 minutes no more than 2 in 24 hours). 36 Tablet 1    erenumab-aooe (Aimovig Autoinjector) 140 mg/mL injection 1 mL by SubCUTAneous route every thirty (30) days. 1 Each 5    clonazePAM (KlonoPIN) 1 mg tablet Take 1 Tablet by mouth nightly.  Max Daily Amount: 1 mg. 30 Tablet 2    butalbital-acetaminophen-caff (Fioricet) -40 mg per capsule Take 1 Capsule by mouth every four (4) hours as needed for Headache. 10 Capsule 0    acyclovir (ZOVIRAX) 400 mg tablet Take 400 mg by mouth as needed. diclofenac EC (VOLTAREN) 75 mg EC tablet diclofenac sodium 75 mg tablet,delayed release   TK 1 T PO BID      butalbitaL-acetaminop-caf-cod -89-30 mg cap butalbital 50 mg-acetaminophen 300 mg-caffeine 40 mg-codeine 30 mg cap   Take 1 capsule every 4 hours by oral route.      etodolac (LODINE) 400 mg tablet etodolac 400 mg tablet   TAKE 1 TABLET BY MOUTH TWICE DAILY      pantoprazole (PROTONIX) 40 mg tablet Take 40 mg by mouth daily. prochlorperazine (COMPAZINE) 10 mg tablet prochlorperazine maleate 10 mg tablet   TAKE 1 TABLET BY MOUTH THREE TIMES DAILY      promethazine (PHENERGAN) 25 mg tablet Take 25 mg by mouth every eight (8) hours as needed. metaxalone (SKELAXIN) 800 mg tablet Take 1 Tablet by mouth three (3) times daily. 270 Tablet 3    atenoloL (TENORMIN) 50 mg tablet Take 1 Tablet by mouth two (2) times a day. 180 Tablet 3    atorvastatin (LIPITOR) 20 mg tablet Take 1 Tablet by mouth daily. 90 Tablet 3    cyanocobalamin (VITAMIN B12) 1,000 mcg/mL injection 1,000 mcg by IntraMUSCular route every Sunday. albuterol (PROVENTIL HFA, VENTOLIN HFA, PROAIR HFA) 90 mcg/actuation inhaler Take 1 Puff by inhalation every six (6) hours as needed for Wheezing. therapeutic multivitamin (THERAGRAN) tablet Take 1 Tablet by mouth daily. ascorbic acid, vitamin C, (VITAMIN C) 500 mg tablet Take 500 mg by mouth daily. cholecalciferol (VITAMIN D3) (1000 Units /25 mcg) tablet Take 1,000 Units by mouth daily. ondansetron hcl (ZOFRAN) 4 mg tablet TAKE 1 TABLET BY MOUTH DAILY AS NEEDED FOR NAUSEA OR VOMITING 90 Tablet 3    omeprazole (PRILOSEC) 20 mg capsule Take 20 mg by mouth daily. fluticasone propion-salmeteroL (ADVAIR/WIXELA) 250-50 mcg/dose diskus inhaler Take 1 Puff by inhalation every twelve (12) hours.       diphenhydrAMINE (BENADRYL) 25 mg capsule Take 25 mg by mouth as needed. Indications: [facility]erigc to [facility] - only takes with evette      amoxicillin (AMOXIL) 875 mg tablet Take 875 mg by mouth every twelve (12) hours. (Patient not taking: No sig reported)      ubrogepant Birdena Hummingbird) 100 mg tablet Take 1 tab p.o. prn for severe  Headache , not to exceed 1 dose in 24 hrs (Patient not taking: Reported on 10/27/2022) 16 Tablet 11    magnesium oxide 250 mg magnesium tablet magnesium 250 mg (as magnesium oxide) tablet   TK 1 T PO BID (Patient not taking: No sig reported)          Objective:   Vitals:  Vitals:    10/27/22 0758   BP: 135/72   Pulse: 70   Resp: 18   Temp: 98 °F (36.7 °C)   TempSrc: Temporal   PainSc:   7               Lab Data Reviewed:  Lab Results   Component Value Date/Time    WBC 9.0 10/05/2022 05:53 PM    HCT 41.6 10/05/2022 05:53 PM    HGB 12.9 10/05/2022 05:53 PM    PLATELET 029 83/43/0944 05:53 PM       Lab Results   Component Value Date/Time    Sodium 142 10/05/2022 08:36 PM    Potassium 3.8 10/05/2022 08:36 PM    Chloride 112 (H) 10/05/2022 08:36 PM    CO2 25 10/05/2022 08:36 PM    Glucose 105 (H) 10/05/2022 08:36 PM    BUN 13 10/05/2022 08:36 PM    Creatinine 0.79 10/05/2022 08:36 PM    Calcium 9.0 10/05/2022 08:36 PM       No components found for: TROPQUANT    No results found for: GARCÍA      Lab Results   Component Value Date/Time    Hemoglobin A1c 5.8 (H) 10/02/2021 04:59 AM        Lab Results   Component Value Date/Time    Vitamin B12 1,502 (H) 07/25/2019 11:35 AM    Folate >20.0 07/25/2019 11:35 AM       No results found for: GARCÍA, ANARX, ANAIGG, XBANA    No results found for: CHOL, CHOLPOCT, CHOLX, CHLST, CHOLV, HDL, HDLPOC, HDLP, LDL, LDLCPOC, LDLC, DLDLP, VLDLC, VLDL, TGLX, TRIGL, TRIGP, TGLPOCT, CHHD, CHHDX      CT Results (recent):  Results from Hospital Encounter encounter on 10/05/22    CTA HEAD NECK W CONT    Narrative  CLINICAL HISTORY: headache  INDICATION: headache    COMPARISON: 10/5/2022. 4/27/2021]    CONTRAST: 100 ml [id number]    TECHNIQUE:  Unenhanced  images were obtained to localize the volume for  acquisition. Multislice helical axial CT angiography was performed from the  aortic arch to the top of the head during uneventful rapid bolus intravenous  contrast administration. Coronal and sagittal reformations and 3D post  processing was performed. CT dose reduction was achieved through use of a  standardized protocol tailored for this examination and automatic exposure  control for dose modulation. Examination was evaluated utilizing the viz. AI algorithm. FINDINGS:  CTA NECK  There is motion artifact limits examination. Patient habitus also limits exam..  The bilateral subclavian, common carotid, and internal carotid arteries are  patent with no flow-limiting stenosis. % of right carotid artery stenosis: 0  % of left carotid artery stenosis: 0  Measurements utilizing NASCET criteria. NASCET method was utilized for calculating stenosis. There is a retropharyngeal course of the common carotid and internal carotid  arteries. The vertebrobasilar system is diminutive in size. . The cervical soft  tissues are unremarkable. There are degenerative changes of the cervical spine. CTA HEAD  Diminutive vertebral arteries. Left vertebral artery slightly larger than the  right vertebral artery. A1 segments are present bilaterally. Patty Bloodgood Patty Bloodgood A 2 segments are patent. Symmetric arborization of M2 vessels is demonstrated. The basilar artery is patent. . The M1 segments are patent bilaterally. .The  posterior cerebral arteries on the right and on the left are patent. .  There is  no aneurysm. There are no sizable posterior communicating arteries. No evidence of acute intracranial hemorrhage or midline shift is demonstrated. Very small hypodense foci in the basal ganglia on the right and on the left and  corresponding imaging findings demonstrated on brain . Stable.     Impression  There is no evidence of aneurysm, dissection or hemodynamically significant  stenosis. .    There is no intracranial mass, hemorrhage or evidence of acute infarction. No acute intracranial process is identified. Nayelikoffi Robe MRI Results (recent):  Results from [facility] encounter on 04/27/21    MRI BRAIN W WO CONT    Narrative  EXAM:  MRI BRAIN W WO CONT    INDICATION:    Migraine, transient alteration of awareness. COMPARISON:  CT head 10/5/2018. CONTRAST: 20 ml Dotarem. TECHNIQUE:  Multiplanar multisequence acquisition without and with contrast of the brain. FINDINGS:  The ventricles are normal in size and position. There are small foci of rounded  T2/FLAIR hyperintensity noted within the bilateral globus pallidus. The brain  parenchyma otherwise has normal signal characteristics. There is no acute  infarct, hemorrhage, extra-axial fluid collection, or mass effect. There is no  cerebellar tonsillar herniation. Expected arterial flow-voids are present. No  evidence of abnormal enhancement. The paranasal sinuses, mastoid air cells, and middle ears are clear. The orbital  contents are within normal limits. No significant osseous or scalp lesions are  identified. Impression  1. Small foci of rounded T2/FLAIR hyperintensity in the bilateral globus  pallidus, suggestive of encephalomalacia from a nonspecific remote insult, and  most commonly seen as sequela of carbon monoxide toxicity. Correlate clinically. 2. Otherwise unremarkable brain MRI. IR Results (recent):  No results found for this or any previous visit. VAS/US Results (recent):  No results found for this or any previous visit. PHYSICAL EXAM:  General:    Alert, cooperative, no distress, appears stated age. Head:   Normocephalic, without obvious abnormality, atraumatic. Eyes:   Conjunctivae/corneas clear. PERRLA  Nose:  Nares normal. No drainage or sinus tenderness.   Throat:    Lips, mucosa, and tongue normal.  No Thrush  Neck:  Supple, symmetrical,  no adenopathy, thyroid: non tender    no carotid bruit and no JVD. Paraspinal tenderness. Back:    Symmetric, diffuse tenderness. Lungs:   Clear to auscultation bilaterally. No Wheezing or Rhonchi. No rales. Chest wall:  No tenderness or deformity. No Accessory muscle use. Heart:   Regular rate and rhythm,  no murmur, rub or gallop. Abdomen:   Soft, non-tender. Not distended. Bowel sounds normal. No masses  Extremities: Extremities normal, atraumatic, No cyanosis. No edema. No clubbing  Skin:     Texture, turgor normal. No rashes or lesions. Not Jaundiced  Lymph nodes: Cervical, supraclavicular normal.  Psych:  Good insight. Depressed. Anxious      NEUROLOGICAL EXAM:  Appearance: The patient is well developed, well nourished, provides a coherent history and is in no acute distress. Mental Status: Oriented to time, place and person. Mood and affect appropriate. Cranial Nerves:   Intact visual fields. Fundi are benign. SHAVON, EOM's full, no nystagmus, no ptosis. Facial sensation is normal. Corneal reflexes are intact. Facial movement is symmetric. Hearing is normal bilaterally. Palate is midline with normal sternocleidomastoid and trapezius muscles are normal. Tongue is midline. Motor:  5-/5 strength in upper and lower proximal and distal muscles. Normal bulk and tone. No fasciculations. Reflexes:   Deep tendon reflexes 2+/4 and symmetrical.   Sensory:   Dysesthesia to touch, pinprick and vibration. Gait:  Normal gait. Tremor:   Mild tremor noted. Cerebellar:  No cerebellar signs present. Neurovascular:  Normal heart sounds and regular rhythm, peripheral pulses intact, and no carotid bruits. Assesment  1. Intractable chronic migraine without aura and with status migrainosus  Trigger point injection    2. Intractable migraine without aura and without status migrainosus  Trigger point injection    3.  Complicated migraine  Trigger point injection ___________________________________________________  PLAN: Medication and plan discussed with patient      ICD-10-CM ICD-9-CM    1. Intractable chronic migraine without aura and with status migrainosus  G43.711 346.73       2. Intractable migraine without aura and without status migrainosus  G43.019 346.11       3.  Complicated migraine  U00.182 346.00               ___________________________________________________    Attending Physician: Uriel Reid MD

## 2022-10-27 NOTE — PROGRESS NOTES
Neurology Progress Note    NAME:  Blas Trujillo   :   1975   MRN:   [id number]     Date/Time:  10/26/2022  Subjective:      Blas Trujillo is a 48 y.o. female here today for persistent headache, idiopathic intracranial hypertension, neck pain. Patient says she still having persistent severe daily headache, cannot stand any bright light, infarct, patient was in the dark when I entered the room  According to patient, headache frequency is nearly daily, headache is throbbing in nature, holocephalic, it is associated with severe photophobia, phonophobia, dizziness, blurry vision, visual obscuration, nausea and occasional vomiting. She says she still experiences occasional confusion and loss of consciousness mostly with headache. She says photopsia is new. As previously stated headache has been going on for more than 30 years, she has been on the following medication without much help:  Depakote  Zonegran  Verapamil  Elavil  Pamelor  Botox injection  Cymbalta  Neurontin  Lyrica  Patient is currently on CGRP-Aimovig/Ubrelvy and also occasionally receive trigger point injection. She noted that medications tend  to take the edge off but the headache is still there. Patient says he is having a lot of neck pain, and sometimes the neck appears stiff. Due to persistent headache, stiffness of the neck and no obvious relief for some time now, I will schedule patient for trigger point injection with occipital nerve block. Patient also has been experiencing numbness and tingling sensation of the extremities. Patient's EEG is still pending  She denies dysphagia or odynophagia.   Review of Systems - General ROS: positive for  - fatigue, malaise, night sweats, and sleep disturbance  Psychological ROS: positive for - anxiety, concentration difficulties, depression, disorientation, memory difficulties, and sleep disturbances  Ophthalmic ROS: positive for - decreased vision, double vision, and photophobia  ENT ROS: positive for - headaches, tinnitus, vertigo, and visual changes  Allergy and Immunology ROS: negative  Hematological and Lymphatic ROS: negative  Endocrine ROS: negative  Respiratory ROS: no cough, shortness of breath, or wheezing  Cardiovascular ROS: no chest pain or dyspnea on exertion  Gastrointestinal ROS: no abdominal pain, change in bowel habits, or black or bloody stools  Genito-Urinary ROS: no dysuria, trouble voiding, or hematuria  Musculoskeletal ROS: positive for - joint pain, muscle pain, and muscular weakness  Neurological ROS: positive for - confusion, dizziness, headaches, impaired coordination/balance, memory loss, numbness/tingling, seizures, visual changes, and weakness  Dermatological ROS: negative   Medications reviewed:  Current Outpatient Medications   Medication Sig Dispense Refill    rimegepant (NURTEC) 75 mg disintegrating tablet Take 1 tab every other day , then 1 tab prn for severe headache, not exceed 1 dose in 24 hrs 48 Tablet 4    busPIRone (BUSPAR) 15 mg tablet Take 1 Tablet by mouth three (3) times daily. 270 Tablet 3    zonisamide (ZONEGRAN) 100 mg capsule TAKE 2 CAPSULES BY MOUTH TWICE DAILY 360 Capsule 3    acetaZOLAMIDE SR (DIAMOX) 500 mg capsule Take 1 Capsule by mouth two (2) times a day. 180 Capsule 3    tiZANidine (ZANAFLEX) 4 mg tablet Take one in the day and two at night 90 Tablet 3    traZODone (DESYREL) 50 mg tablet Take 1 Tablet by mouth nightly. 30 Tablet 11    predniSONE (STERAPRED) 5 mg dose pack See administration instruction per 5mg dose pack 21 Tablet 0    QUEtiapine (SEROquel) 100 mg tablet TAKE 1 TABLET BY MOUTH EVERY DAY AT BEDTIME 30 Tablet 5    traZODone (DESYREL) 100 mg tablet TAKE 1 TABLET BY MOUTH EVERY NIGHT 90 Tablet 3    metFORMIN (GLUCOPHAGE) 1,000 mg tablet TAKE 1 TABLET BY MOUTH TWICE DAILY WITH THE MORNING AND EVENING MEAL 240 Tablet 1    pregabalin (LYRICA) 200 mg capsule TAKE 1 CAPSULE BY MOUTH TWICE DAILY.  MAX DAILY AMOUNT: 400  Capsule 5    EPINEPHrine (EPIPEN) 0.3 mg/0.3 mL injection ADMINISTER 0.3 ML IN THE MUSCLE 1 TIME AS NEEDED FOR ANAPHYLAXIS      rosuvastatin (CRESTOR) 20 mg tablet Take 20 mg by mouth daily. tamsulosin (FLOMAX) 0.4 mg capsule TAKE 1 CAPSULE BY MOUTH EVERY DAY 30 MINUTES AFTER THE SAME MEAL      eletriptan (RELPAX) 40 mg tablet Take 1 Tablet by mouth daily as needed (migraine. May repeat in 40 minutes no more than 2 in 24 hours). 36 Tablet 1    erenumab-aooe (Aimovig Autoinjector) 140 mg/mL injection 1 mL by SubCUTAneous route every thirty (30) days. 1 Each 5    ubrogepant (Ubrelvy) 100 mg tablet Take 1 tab p.o. prn for severe  Headache , not to exceed 1 dose in 24 hrs (Patient not taking: Reported on 10/27/2022) 16 Tablet 11    clonazePAM (KlonoPIN) 1 mg tablet Take 1 Tablet by mouth nightly. Max Daily Amount: 1 mg. 30 Tablet 2    butalbital-acetaminophen-caff (Fioricet) -40 mg per capsule Take 1 Capsule by mouth every four (4) hours as needed for Headache. 10 Capsule 0    acyclovir (ZOVIRAX) 400 mg tablet Take 400 mg by mouth as needed. diclofenac EC (VOLTAREN) 75 mg EC tablet diclofenac sodium 75 mg tablet,delayed release   TK 1 T PO BID      butalbitaL-acetaminop-caf-cod -58-30 mg cap butalbital 50 mg-acetaminophen 300 mg-caffeine 40 mg-codeine 30 mg cap   Take 1 capsule every 4 hours by oral route. pantoprazole (PROTONIX) 40 mg tablet Take 40 mg by mouth daily. prochlorperazine (COMPAZINE) 10 mg tablet prochlorperazine maleate 10 mg tablet   TAKE 1 TABLET BY MOUTH THREE TIMES DAILY      promethazine (PHENERGAN) 25 mg tablet Take 25 mg by mouth every eight (8) hours as needed. metaxalone (SKELAXIN) 800 mg tablet Take 1 Tablet by mouth three (3) times daily. 270 Tablet 3    atenoloL (TENORMIN) 50 mg tablet Take 1 Tablet by mouth two (2) times a day. 180 Tablet 3    atorvastatin (LIPITOR) 20 mg tablet Take 1 Tablet by mouth daily.  90 Tablet 3    cyanocobalamin (VITAMIN B12) 1,000 mcg/mL injection 1,000 mcg by IntraMUSCular route every Sunday. albuterol (PROVENTIL HFA, VENTOLIN HFA, PROAIR HFA) 90 mcg/actuation inhaler Take 1 Puff by inhalation every six (6) hours as needed for Wheezing. therapeutic multivitamin (THERAGRAN) tablet Take 1 Tablet by mouth daily. ascorbic acid, vitamin C, (VITAMIN C) 500 mg tablet Take 500 mg by mouth daily. cholecalciferol (VITAMIN D3) (1000 Units /25 mcg) tablet Take 1,000 Units by mouth daily. ondansetron hcl (ZOFRAN) 4 mg tablet TAKE 1 TABLET BY MOUTH DAILY AS NEEDED FOR NAUSEA OR VOMITING 90 Tablet 3    omeprazole (PRILOSEC) 20 mg capsule Take 20 mg by mouth daily. fluticasone propion-salmeteroL (ADVAIR/WIXELA) 250-50 mcg/dose diskus inhaler Take 1 Puff by inhalation every twelve (12) hours. diphenhydrAMINE (BENADRYL) 25 mg capsule Take 25 mg by mouth as needed. Indications: Allerigc to [facility] - only takes with evette      onabotulinumtoxinA (Botox) 200 unit injection 155 units by IM to 31 approved sites into head and neck every 12 weeks for chronic migraine. Indications: migraine prevention 200 Units 3    QUEtiapine (SEROquel) 50 mg tablet TAKE 1 TABLET BY MOUTH EVERY NIGHT 90 Tablet 3    QUEtiapine (SEROquel) 50 mg tablet TAKE 1 TABLET BY MOUTH EVERY NIGHT 90 Tablet 3    amoxicillin (AMOXIL) 875 mg tablet Take 875 mg by mouth every twelve (12) hours. (Patient not taking: No sig reported)      Orilissa 200 mg tab Take 1 Tablet by mouth two (2) times a day.       etodolac (LODINE) 400 mg tablet etodolac 400 mg tablet   TAKE 1 TABLET BY MOUTH TWICE DAILY      magnesium oxide 250 mg magnesium tablet magnesium 250 mg (as magnesium oxide) tablet   TK 1 T PO BID (Patient not taking: No sig reported)          Objective:   Vitals:  Vitals:    10/26/22 1044   BP: 137/79               Lab Data Reviewed:  Lab Results   Component Value Date/Time    WBC 9.0 10/05/2022 05:53 PM    HCT 41.6 10/05/2022 05:53 PM    HGB 12.9 10/05/2022 05:53 PM    PLATELET 707 12/96/4411 05:53 PM       Lab Results   Component Value Date/Time    Sodium 142 10/05/2022 08:36 PM    Potassium 3.8 10/05/2022 08:36 PM    Chloride 112 (H) 10/05/2022 08:36 PM    CO2 25 10/05/2022 08:36 PM    Glucose 105 (H) 10/05/2022 08:36 PM    BUN 13 10/05/2022 08:36 PM    Creatinine 0.79 10/05/2022 08:36 PM    Calcium 9.0 10/05/2022 08:36 PM       No components found for: TROPQUANT    No results found for: GARCÍA      Lab Results   Component Value Date/Time    Hemoglobin A1c 5.8 (H) 10/02/2021 04:59 AM        Lab Results   Component Value Date/Time    Vitamin B12 1,502 (H) 07/25/2019 11:35 AM    Folate >20.0 07/25/2019 11:35 AM       No results found for: GARCÍA, ANARX, ANAIGG, XBANA    No results found for: CHOL, CHOLPOCT, CHOLX, CHLST, CHOLV, HDL, HDLPOC, HDLP, LDL, LDLCPOC, LDLC, DLDLP, VLDLC, VLDL, TGLX, TRIGL, TRIGP, TGLPOCT, CHHD, CHHDX      CT Results (recent):  Results from Hospital Encounter encounter on 10/05/22    CTA HEAD NECK W CONT    Narrative  CLINICAL HISTORY: headache  INDICATION: headache    COMPARISON: 10/5/2022. 4/27/2021]    CONTRAST: 100 ml [id number]    TECHNIQUE:  Unenhanced  images were obtained to localize the volume for  acquisition. Multislice helical axial CT angiography was performed from the  aortic arch to the top of the head during uneventful rapid bolus intravenous  contrast administration. Coronal and sagittal reformations and 3D post  processing was performed. CT dose reduction was achieved through use of a  standardized protocol tailored for this examination and automatic exposure  control for dose modulation. Examination was evaluated utilizing the viz. AI algorithm. FINDINGS:  CTA NECK  There is motion artifact limits examination. Patient habitus also limits exam..  The bilateral subclavian, common carotid, and internal carotid arteries are  patent with no flow-limiting stenosis.     % of right carotid artery stenosis: 0  % of left carotid artery stenosis: 0  Measurements utilizing NASCET criteria. NASCET method was utilized for calculating stenosis. There is a retropharyngeal course of the common carotid and internal carotid  arteries. The vertebrobasilar system is diminutive in size. . The cervical soft  tissues are unremarkable. There are degenerative changes of the cervical spine. CTA HEAD  Diminutive vertebral arteries. Left vertebral artery slightly larger than the  right vertebral artery. A1 segments are present bilaterally. Sherryle Ed Sherryle Moder A 2 segments are patent. Symmetric arborization of M2 vessels is demonstrated. The basilar artery is patent. . The M1 segments are patent bilaterally. .The  posterior cerebral arteries on the right and on the left are patent. .  There is  no aneurysm. There are no sizable posterior communicating arteries. No evidence of acute intracranial hemorrhage or midline shift is demonstrated. Very small hypodense foci in the basal ganglia on the right and on the left and  corresponding imaging findings demonstrated on brain . Stable. Impression  There is no evidence of aneurysm, dissection or hemodynamically significant  stenosis. .    There is no intracranial mass, hemorrhage or evidence of acute infarction. No acute intracranial process is identified. Sherryle Moder MRI Results (recent):  Results from [place] encounter on 04/27/21    MRI BRAIN W WO CONT    Narrative  EXAM:  MRI BRAIN W WO CONT    INDICATION:    Migraine, transient alteration of awareness. COMPARISON:  CT head 10/5/2018. CONTRAST: 20 ml Dotarem. TECHNIQUE:  Multiplanar multisequence acquisition without and with contrast of the brain. FINDINGS:  The ventricles are normal in size and position. There are small foci of rounded  T2/FLAIR hyperintensity noted within the bilateral globus pallidus. The brain  parenchyma otherwise has normal signal characteristics.  There is no acute  infarct, hemorrhage, extra-axial fluid collection, or mass effect. There is no  cerebellar tonsillar herniation. Expected arterial flow-voids are present. No  evidence of abnormal enhancement. The paranasal sinuses, mastoid air cells, and middle ears are clear. The orbital  contents are within normal limits. No significant osseous or scalp lesions are  identified. Impression  1. Small foci of rounded T2/FLAIR hyperintensity in the bilateral globus  pallidus, suggestive of encephalomalacia from a nonspecific remote insult, and  most commonly seen as sequela of carbon monoxide toxicity. Correlate clinically. 2. Otherwise unremarkable brain MRI. IR Results (recent):  No results found for this or any previous visit. VAS/US Results (recent):  No results found for this or any previous visit. PHYSICAL EXAM:  General:    Alert, cooperative, no distress, appears stated age. Head:   Normocephalic, without obvious abnormality, atraumatic. Eyes:   Conjunctivae/corneas clear. PERRLA  Nose:  Nares normal. No drainage or sinus tenderness. Throat:    Lips, mucosa, and tongue normal.  No Thrush  Neck:  Supple, symmetrical,  no adenopathy, thyroid: non tender    no carotid bruit and no JVD. Paraspinal tenderness  Back:    Symmetric, diffuse tenderness. Lungs:   Clear to auscultation bilaterally. No Wheezing or Rhonchi. No rales. Chest wall:  No tenderness or deformity. No Accessory muscle use. Heart:   Regular rate and rhythm,  no murmur, rub or gallop. Abdomen:   Soft, non-tender. Not distended. Bowel sounds normal. No masses  Extremities: Extremities normal, atraumatic, No cyanosis. No edema. No clubbing  Skin:     Texture, turgor normal. No rashes or lesions. Not Jaundiced  Lymph nodes: Cervical, supraclavicular normal.  Psych:  Good insight. Not depressed. Anxious      NEUROLOGICAL EXAM:  Appearance: The patient is well developed, well nourished, provides a coherent history and is in no acute distress.    Mental Status: Oriented to time, place and person. Mood and affect appropriate. Cranial Nerves:   Intact visual fields. Fundi are benign. SHAVON, EOM's full, no nystagmus, no ptosis. Facial sensation is normal. Corneal reflexes are intact. Facial movement is symmetric. Hearing is normal bilaterally. Palate is midline with normal sternocleidomastoid and trapezius muscles are normal. Tongue is midline. Motor:  5-/5 strength in upper and lower proximal and distal muscles. Normal bulk and tone. No fasciculations. Reflexes:   Deep tendon reflexes 2+/4 and symmetrical.   Sensory:   Dysesthesia to touch, pinprick and vibration. Gait:  Normal gait. Tremor:   No tremor noted. Cerebellar:  No cerebellar signs present. Neurovascular:  Normal heart sounds and regular rhythm, peripheral pulses intact, and no carotid bruits. Assesment  1. Intractable chronic migraine without aura and with status migrainosus  Trigger point injection  - zonisamide (ZONEGRAN) 100 mg capsule; TAKE 2 CAPSULES BY MOUTH TWICE DAILY  Dispense: 360 Capsule; Refill: 3  - predniSONE (STERAPRED) 5 mg dose pack; See administration instruction per 5mg dose pack  Dispense: 21 Tablet; Refill: 0    2. Intractable migraine without aura and without status migrainosus  Trigger point injection    3. Complicated migraine  Trigger point injection    4. Partial symptomatic epilepsy with complex partial seizures, not intractable, without status epilepticus ([address]  Zonegran    5. IIH (idiopathic intracranial hypertension)  Diamox    6. Tremor, essential  Stable    7. Anxiety    - busPIRone (BUSPAR) 15 mg tablet; Take 1 Tablet by mouth three (3) times daily. Dispense: 270 Tablet; Refill: 3    8. Pseudotumor cerebri    - acetaZOLAMIDE SR (DIAMOX) 500 mg capsule; Take 1 Capsule by mouth two (2) times a day. Dispense: 180 Capsule; Refill: 3    9. Fibromyalgia    - tiZANidine (ZANAFLEX) 4 mg tablet; Take one in the day and two at night  Dispense: 90 Tablet;  Refill: 3    ___________________________________________________  PLAN: Medication and plan discussed with patient      ICD-10-CM ICD-9-CM    1. Intractable chronic migraine without aura and with status migrainosus  G43.711 346.73 zonisamide (ZONEGRAN) 100 mg capsule      predniSONE (STERAPRED) 5 mg dose pack      2. Intractable migraine without aura and without status migrainosus  G43.019 346.11       3. Complicated migraine  D22.906 346.00       4. Partial symptomatic epilepsy with complex partial seizures, not intractable, without status epilepticus ([facility] [address]  G40.209 345.40       5. IIH (idiopathic intracranial hypertension)  G93.2 348.2       6. Tremor, essential  G25.0 333.1       7. Anxiety  F41.9 300.00 busPIRone (BUSPAR) 15 mg tablet      8. Pseudotumor cerebri  G93.2 348.2 acetaZOLAMIDE SR (DIAMOX) 500 mg capsule      9. Fibromyalgia  M79.7 729.1 tiZANidine (ZANAFLEX) 4 mg tablet        Follow-up and Dispositions    Return in about 3 months (around 1/26/2023).                ___________________________________________________    Attending Physician: Garrick Read MD

## 2022-12-22 ENCOUNTER — TELEPHONE (OUTPATIENT)
Dept: NEUROLOGY | Age: 47
End: 2022-12-22

## 2022-12-22 NOTE — TELEPHONE ENCOUNTER
Approval for Botox co-pay card was approved ask patient to call Deng [address] [place] [address] [phone] Patient Co-pay

## 2022-12-22 NOTE — TELEPHONE ENCOUNTER
Spoke with patient she has not called about the Botox assistance co-pay card  gave her the # [phone] to call. Request she call me back to verify if the 1-11-23  appt  will be needed.

## 2023-01-05 NOTE — TELEPHONE ENCOUNTER
Patient has not reached out for assistance she had not been feeling well request she call and updated before 1/6/23 @ 3 pm or will need to reschedule the botox appt for 1/11/23

## 2023-01-06 NOTE — TELEPHONE ENCOUNTER
Spoke with patient she still has not called for co-pay assistance for Botox # given for her to contact them and [employer] to authorize shipment to office

## 2023-01-09 NOTE — TELEPHONE ENCOUNTER
VOICEMAIL      Pt states this is her 5th call and she has not gotten a call back. Please call [phone]. States she needs to talk to someone about her botox.

## 2023-01-10 NOTE — TELEPHONE ENCOUNTER
Botox came in the office: 01/10/23    MFR: Princess Montielr    LOT: [id number]    Exp:09/2025    Appointment:01/11/23    Provider:Oswald EPPERSNO    Specialty pharmacy:[facility] [address] Phone [address]

## 2023-01-11 ENCOUNTER — OFFICE VISIT (OUTPATIENT)
Dept: NEUROLOGY | Age: 48
End: 2023-01-11
Payer: COMMERCIAL

## 2023-01-11 DIAGNOSIS — Z92.29 S/P BOTOX INJECTION: ICD-10-CM

## 2023-01-11 DIAGNOSIS — G43.711 INTRACTABLE CHRONIC MIGRAINE WITHOUT AURA AND WITH STATUS MIGRAINOSUS: Primary | ICD-10-CM

## 2023-01-11 PROCEDURE — 64615 CHEMODENERV MUSC MIGRAINE: CPT | Performed by: NURSE PRACTITIONER

## 2023-01-11 NOTE — PROCEDURES
Botox Injection Note       Indication: patient has chronic recurrent migraine. Procedure:   Botox concentration: 200 units in 4 ml of preservative-free normal saline. [address]: [id number]  Lot number: [id number]  Expiration date: 09/2025      31 sites injections, distribution as follow      Units/site  Sites Sides Subtotal    Procerus 5 1 1 5    5 1 2 10   Frontalis 5 2 2 20   Temporalis 5 4 2 40   Occipitalis 5 3 2 30   Upper cervical paraspinalis 5 2 2 20   Trapezius 5 3 2 30         200 units Botox were reconstituted, 155 units injected as above and the remainder was unavoidably wasted.      Patient tolerated procedure well.       _____________________________   Darryl Tang NP

## 2023-01-11 NOTE — PROGRESS NOTES
[facility]  [address] [place] [address] [address] [place] [address]  Angel Mora  Tel: [phone]  Fax: [phone]      Date:  23     Name:  Chirag Kelley  :  1975  MRN:  [id number]     PCP:  Anthony Robledo MD    Chief Complaint   Patient presents with    Procedure     botox       HISTORY OF PRESENT ILLNESS:  Patient presents today for chronic migraine, patient is receiving Botox injections today. She was previously managed by Dr Kellen Esqueda. It has been nearly 2 years since the patient has had Botox injections, she is very hopeful that this will help with her migraines. She complains of a daily headache, when she does get her migraines they can last for several days at a time. Headache Characteristics: squeezing pain and hitting with a bat. Location of the headache:  left side of head  Frequency: 20+/30 days. Length of headache/migraine: can last from days to weeks. Pain Level:   9/10  Aura:yes at times  Nausea/Vomiting: yes, some vomiting. Photophobia: yes  Phonophobia: yes  Provoking factors: raw meat smells. Relieving factors: Dark room, laying down, not much. 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 Caffeine:pepsi 5-6 per day. Tobacco use:no. Some marijuana. No etoh. Sleep habits:poor  Exercise:walking      REVIEW OF SYSTEMS:     Review of Systems   Neurological:  Positive for headaches. All other systems reviewed and are negative. Current Outpatient Medications   Medication Sig    rimegepant (NURTEC) 75 mg disintegrating tablet Take 1 tab every other day , then 1 tab prn for severe headache, not exceed 1 dose in 24 hrs    busPIRone (BUSPAR) 15 mg tablet Take 1 Tablet by mouth three (3) times daily.     zonisamide (ZONEGRAN) 100 mg capsule TAKE 2 CAPSULES BY MOUTH TWICE DAILY    acetaZOLAMIDE SR (DIAMOX) 500 mg capsule Take 1 Capsule by mouth two (2) times a day. tiZANidine (ZANAFLEX) 4 mg tablet Take one in the day and two at night    traZODone (DESYREL) 50 mg tablet Take 1 Tablet by mouth nightly. predniSONE (STERAPRED) 5 mg dose pack See administration instruction per 5mg dose pack    onabotulinumtoxinA (Botox) 200 unit injection 155 units by IM to 31 approved sites into head and neck every 12 weeks for chronic migraine. Indications: migraine prevention    QUEtiapine (SEROquel) 50 mg tablet TAKE 1 TABLET BY MOUTH EVERY NIGHT    QUEtiapine (SEROquel) 100 mg tablet TAKE 1 TABLET BY MOUTH EVERY DAY AT BEDTIME    traZODone (DESYREL) 100 mg tablet TAKE 1 TABLET BY MOUTH EVERY NIGHT    metFORMIN (GLUCOPHAGE) 1,000 mg tablet TAKE 1 TABLET BY MOUTH TWICE DAILY WITH THE MORNING AND EVENING MEAL    pregabalin (LYRICA) 200 mg capsule TAKE 1 CAPSULE BY MOUTH TWICE DAILY. MAX DAILY AMOUNT: 400 MG    QUEtiapine (SEROquel) 50 mg tablet TAKE 1 TABLET BY MOUTH EVERY NIGHT    amoxicillin (AMOXIL) 875 mg tablet Take 875 mg by mouth every twelve (12) hours. (Patient not taking: No sig reported)    Orilissa 200 mg tab Take 1 Tablet by mouth two (2) times a day. EPINEPHrine (EPIPEN) 0.3 mg/0.3 mL injection ADMINISTER 0.3 ML IN THE MUSCLE 1 TIME AS NEEDED FOR ANAPHYLAXIS    rosuvastatin (CRESTOR) 20 mg tablet Take 20 mg by mouth daily. tamsulosin (FLOMAX) 0.4 mg capsule TAKE 1 CAPSULE BY MOUTH EVERY DAY 30 MINUTES AFTER THE SAME MEAL    eletriptan (RELPAX) 40 mg tablet Take 1 Tablet by mouth daily as needed (migraine. May repeat in 40 minutes no more than 2 in 24 hours). erenumab-aooe (Aimovig Autoinjector) 140 mg/mL injection 1 mL by SubCUTAneous route every thirty (30) days.     ubrogepant Valri Chalo) 100 mg tablet Take 1 tab p.o. prn for severe  Headache , not to exceed 1 dose in 24 hrs (Patient not taking: Reported on 10/27/2022)    clonazePAM (KlonoPIN) 1 mg tablet Take 1 Tablet by mouth nightly. Max Daily Amount: 1 mg.    butalbital-acetaminophen-caff (Fioricet) -40 mg per capsule Take 1 Capsule by mouth every four (4) hours as needed for Headache.    acyclovir (ZOVIRAX) 400 mg tablet Take 400 mg by mouth as needed. diclofenac EC (VOLTAREN) 75 mg EC tablet diclofenac sodium 75 mg tablet,delayed release   TK 1 T PO BID    butalbitaL-acetaminop-caf-cod -32-30 mg cap butalbital 50 mg-acetaminophen 300 mg-caffeine 40 mg-codeine 30 mg cap   Take 1 capsule every 4 hours by oral route.    etodolac (LODINE) 400 mg tablet etodolac 400 mg tablet   TAKE 1 TABLET BY MOUTH TWICE DAILY    magnesium oxide 250 mg magnesium tablet magnesium 250 mg (as magnesium oxide) tablet   TK 1 T PO BID (Patient not taking: No sig reported)    pantoprazole (PROTONIX) 40 mg tablet Take 40 mg by mouth daily. prochlorperazine (COMPAZINE) 10 mg tablet prochlorperazine maleate 10 mg tablet   TAKE 1 TABLET BY MOUTH THREE TIMES DAILY    promethazine (PHENERGAN) 25 mg tablet Take 25 mg by mouth every eight (8) hours as needed. metaxalone (SKELAXIN) 800 mg tablet Take 1 Tablet by mouth three (3) times daily. atenoloL (TENORMIN) 50 mg tablet Take 1 Tablet by mouth two (2) times a day. atorvastatin (LIPITOR) 20 mg tablet Take 1 Tablet by mouth daily. cyanocobalamin (VITAMIN B12) 1,000 mcg/mL injection 1,000 mcg by IntraMUSCular route every Sunday. albuterol (PROVENTIL HFA, VENTOLIN HFA, PROAIR HFA) 90 mcg/actuation inhaler Take 1 Puff by inhalation every six (6) hours as needed for Wheezing. therapeutic multivitamin (THERAGRAN) tablet Take 1 Tablet by mouth daily. ascorbic acid, vitamin C, (VITAMIN C) 500 mg tablet Take 500 mg by mouth daily. cholecalciferol (VITAMIN D3) (1000 Units /25 mcg) tablet Take 1,000 Units by mouth daily.     ondansetron hcl (ZOFRAN) 4 mg tablet TAKE 1 TABLET BY MOUTH DAILY AS NEEDED FOR NAUSEA OR VOMITING    omeprazole (PRILOSEC) 20 mg capsule Take 20 mg by mouth daily. fluticasone propion-salmeteroL (ADVAIR/WIXELA) 250-50 mcg/dose diskus inhaler Take 1 Puff by inhalation every twelve (12) hours. diphenhydrAMINE (BENADRYL) 25 mg capsule Take 25 mg by mouth as needed. Indications: Allerigc to [facility] - only takes with evette     No current facility-administered medications for this visit. Allergies   Allergen Reactions    Latex Other (comments)    Fentanyl Shortness of Breath     Other reaction(s): wheezing/sob  Other reaction(s): wheezing/sob  Other reaction(s): wheezing/sob      Lemon Anaphylaxis     Other reaction(s): anaphylaxis/angioedema    Adhesive Tape-Silicones Other (comments)     Pt reports it burns her skin    Dilaudid [Hydromorphone (Pf)] Itching    Percocet [Oxycodone-Acetaminophen] Nausea and Vomiting     Pt states not allergic!      Past Medical History:   Diagnosis Date    Asthma     Diabetes ([facility] [address]     Fibromyalgia     Hypertension     Lower back injury 02/13/2017    Migraines     Nausea and vomiting 1/14/2014    Other ill-defined conditions(799.89)     cerebral tumor, endometriosis, fibromyalgia, herpes    Right lower quadrant abdominal mass 1/14/2014    Seizures (HCC)     Sleep apnea     intolerant to CPAP    Unspecified adverse effect of anesthesia     pt states that she has an asthma attack when coming out of  anesthesia     Past Surgical History:   Procedure Laterality Date    HX CHOLECYSTECTOMY      HX HEENT      wisdom teeth    HX OTHER SURGICAL      laproscopy on ovaries    HX OTHER SURGICAL      left wrist ganglion cystectomy    HX OTHER SURGICAL      lung mass removed    HX TOTAL COLECTOMY  3/14/2013    Rt colectomy for perf transverse diverticulum     Social History     Socioeconomic History    Marital status: LEGALLY      Spouse name: Not on file    Number of children: Not on file    Years of education: Not on file    Highest education level: Not on file   Occupational History    Not on file   Tobacco Use    Smoking status: Never    Smokeless tobacco: Never    Tobacco comments:     vapor   Vaping Use    Vaping Use: Never used   Substance and Sexual Activity    Alcohol use: Not Currently     Comment: ocasionally    Drug use: Not on file     Comment: occ    Sexual activity: Not on file   Other Topics Concern    Not on file   Social History Narrative    Not on file     Social Determinants of Health     Financial Resource Strain: Not on file   Food Insecurity: Not on file   Transportation Needs: Not on file   Physical Activity: Not on file   Stress: Not on file   Social Connections: Not on file   Intimate Partner Violence: Not on file   Housing Stability: Not on file     Family History   Problem Relation Age of Onset    Other Other         unable to obtain due to AMS    Hypertension Father     Heart Disease Father     Cancer Father     Elevated Lipids Father     Hypertension Mother     Diabetes Maternal Grandmother     Diabetes Maternal Grandfather     Lupus Sister     Hypertension Sister     Elevated Lipids Sister          PHYSICAL EXAMINATION:    There were no vitals taken for this visit. General:  Well defined, nourished, and well groomed individual in no acute distress. Neck: Supple, nontender, normal range of motion. Psych:  Good mood and bright affect    NEUROLOGICAL EXAMINATION:     Mental Status:   Alert and oriented to person, place, and time with recent and remote memory intact. Attention span and concentration are normal. Clear speech. Fund of knowledge preserved. Cranial Nerves: Grossly intact. Motor Examination: Normal tone. Gait and Station:  Steady. ASSESSMENT AND PLAN      ICD-10-CM ICD-9-CM    1. Intractable chronic migraine without aura and with status migrainosus  G43.711 346.73 CHEMODERVATE FACIAL/TRIGEM/CERV MUSC MIGRAINE      2. S/P Botox injection  Z92.29 V87.49         1.  Intractable chronic migraine without aura and with status migrainosus: See procedure note, patient received Botox procedure today we will see her back in 12 weeks for repeat injections, there is been several years since she has received Botox therefore advised patient to continue all medications at this time until we get her to a therapeutic regimen of Botox procedures at which time maybe we can wean her off some of the medications. -     CHEMODERVATE FACIAL/TRIGEM/CERV MUSC MIGRAINE  2. S/P Botox injection: See procedure note      Patient and/or family verbalized understand of all instructions and all questions/concerns were addressed. Safety/side effects of medications discussed. We will see patient back in 12 weeks, sooner if needed.     Cliff Nava, FNP-BC

## 2023-01-18 ENCOUNTER — TELEPHONE (OUTPATIENT)
Dept: NEUROLOGY | Age: 48
End: 2023-01-18

## 2023-02-02 DIAGNOSIS — G43.711 INTRACTABLE CHRONIC MIGRAINE WITHOUT AURA AND WITH STATUS MIGRAINOSUS: ICD-10-CM

## 2023-02-06 ENCOUNTER — TELEPHONE (OUTPATIENT)
Dept: NEUROLOGY | Age: 48
End: 2023-02-06

## 2023-02-06 ENCOUNTER — OFFICE VISIT (OUTPATIENT)
Dept: SURGERY | Age: 48
End: 2023-02-06
Payer: COMMERCIAL

## 2023-02-06 VITALS
SYSTOLIC BLOOD PRESSURE: 130 MMHG | WEIGHT: 259 LBS | BODY MASS INDEX: 48.9 KG/M2 | HEIGHT: 61 IN | RESPIRATION RATE: 20 BRPM | TEMPERATURE: 97.5 F | DIASTOLIC BLOOD PRESSURE: 81 MMHG | OXYGEN SATURATION: 98 % | HEART RATE: 63 BPM

## 2023-02-06 DIAGNOSIS — R10.10 UPPER ABDOMINAL PAIN: Primary | ICD-10-CM

## 2023-02-06 PROCEDURE — 99203 OFFICE O/P NEW LOW 30 MIN: CPT | Performed by: SURGERY

## 2023-02-06 PROCEDURE — 3075F SYST BP GE 130 - 139MM HG: CPT | Performed by: SURGERY

## 2023-02-06 PROCEDURE — 3079F DIAST BP 80-89 MM HG: CPT | Performed by: SURGERY

## 2023-02-06 RX ORDER — PREDNISONE 5 MG/1
TABLET ORAL
Qty: 21 TABLET | Refills: 0 | OUTPATIENT
Start: 2023-02-06

## 2023-02-06 NOTE — TELEPHONE ENCOUNTER
Aimovig or Botox    Patient's PA's for both Aimovig and Botox have . ... Generally, insurance will not cover both.     Forwarded to Lashon to verify with Ruth Grover if we are going to try for both or just one (based on conversation with Nitesh Ro, one will deny if both P.A.'s submitted)

## 2023-02-06 NOTE — PROGRESS NOTES
Identified pt with two pt identifiers(name and ). Reviewed record in preparation for visit and have obtained necessary documentation. All patient medications has been reviewed. Chief Complaint   Patient presents with    Abdominal Pain     Seen at the request of self eval abdominal pain        Health Maintenance Due   Topic    Hepatitis C Screening     COVID-19 Vaccine (1)    Pneumococcal 0-64 years (1 - PCV)    Foot Exam Q1     Eye Exam Retinal or Dilated     Lipid Screen     Diabetic Alb to Cr ratio (uACR) test     Hepatitis B Vaccine (1 of 3 - Risk 3-dose series)    DTaP/Tdap/Td series (1 - Tdap)    Cervical cancer screen     Flu Vaccine (1)    A1C test (Diabetic or Prediabetic)        Vitals:    23 1348   BP: 130/81   Pulse: 63   Resp: 20   Temp: 97.5 °F (36.4 °C)   TempSrc: Temporal   SpO2: 98%   Weight: 117.5 kg (259 lb)   Height: 5' 1\" (1.549 m)   PainSc:   9   PainLoc: Abdomen       4. Have you been to the ER, urgent care clinic since your last visit? Hospitalized since your last visit? Yes When: 10/05/2022  headache    5. Have you seen or consulted any other health care providers outside of the [address] [place] [address] since your last visit? Include any pap smears or colon screening. No      Patient is accompanied by self I have received verbal consent from Jose Fallon to discuss any/all medical information while they are present in the room.

## 2023-02-06 NOTE — PROGRESS NOTES
BMI 46. Upper midline pain. Long upper midline scar but no obvious hernia on exam due to body habitus. She has been having some black stools although she says they are back to brown now. We will get her set up for a CAT scan. If no explanation, she will need to see GI. To:  Lillie Joe MD      From: Emy Byrnes MD    Aretha Canada was self-referred. Please note that this dictation was completed with [email or web address], the computer voice recognition software. Quite often unanticipated grammatical, syntax, homophones, and other interpretive errors are inadvertently transcribed by the software. Please disregard these errors. Please excuse any errors that have escaped final proofreading. Encounter Date: 2/6/2023  History and Physical    Assessment:   Upper abdominal pain. Prior upper midline incision for colectomy after perforation of diverticulum by Dr. Cj Lau in 2013. Status post cholecystectomy, appendectomy, sleeve gastrectomy, multiple gynecology laparoscopies. Recent black stool. Now normal brown again. No recent endoscopy. Dr. Thea Tilley has scoped her in the past.    Differential diagnosis includes incisional hernia, peptic ulcer disease, diverticulitis. Body mass index is 48.94 kg/m². Comorbid morbid obesity, asthma, GERD, chronic pain, depression and anxiety, hypertension, sleep apnea, diabetes, pseudotumor cerebri. No known h/o heart disease or stroke. No aspirin or anticoagulation. Plan/Recommendations/Medical Decision Making:   Given her body habitus, physical exam is unreliable. Certainly given the upper midline scar there is possibility for incisional hernia. She is concerned that she has diverticulitis again. She is not having any fever or chills so this is less likely. We discussed the fact that she may need to see a gastroenterologist to evaluate for peptic ulcer disease. We also discussed the possibility of colonoscopy.     If hernia is noted, we will discuss robotic repair. For BMI is above the cutoff for elective repair and we would therefore discuss weight loss prior to repair. I will call her back with CAT scan results. HPI:   Patient is a 50 y.o. female who is self-referred. Back in 2013 Dr. Lotus Lyon did an extended right hemicolectomy for perforated diverticulitis. She has recently developed upper abdominal pain. She says it started about 3 weeks ago. She says its made worse when she eats. She has been nauseated after eating as well but has not thrown up. She says that around that time she started noticing charcoal black stools as well. She says that over the last few days they have returned back to normal brown. She says that she has chronic diarrhea. This has been a problem for years. She says the pain is similar to when her intestines burst.  She is not having any fevers but she does feel like she gets hot and cold. She has previously undergone cholecystectomy, appendectomy, and gastric sleeve at [facility] about 5 years ago. She has also had 5 laparoscopies for gynecologic reasons but has not had her uterus or ovaries removed.        Past Medical History:   Diagnosis Date    Asthma     Depression     Diabetes ([facility] [address]     Fibromyalgia     GERD (gastroesophageal reflux disease)     Hypercholesterolemia     Hypertension     Lower back injury 02/13/2017    Migraines     Nausea and vomiting 01/14/2014    Other ill-defined conditions(799.89)     cerebral tumor, endometriosis, fibromyalgia, herpes    Right lower quadrant abdominal mass 01/14/2014    Seizures (HCC)     Sleep apnea     intolerant to CPAP    Unspecified adverse effect of anesthesia     pt states that she has an asthma attack when coming out of  anesthesia      Past Surgical History:   Procedure Laterality Date    HX CHOLECYSTECTOMY      HX HEENT      wisdom teeth    HX OTHER SURGICAL      laproscopy on ovaries    HX OTHER SURGICAL      left wrist ganglion cystectomy    HX OTHER SURGICAL lung mass removed    HX TOTAL COLECTOMY  3/14/2013    Rt colectomy for perf transverse diverticulum     Social History     Tobacco Use    Smoking status: Never    Smokeless tobacco: Never    Tobacco comments:     vapor   Substance Use Topics    Alcohol use: Not Currently     Comment: ocasionally      Family History   Problem Relation Age of Onset    Other Other         unable to obtain due to AMS    Hypertension Father     Heart Disease Father     Cancer Father     Elevated Lipids Father     Hypertension Mother     Diabetes Maternal Grandmother     Diabetes Maternal Grandfather     Lupus Sister     Hypertension Sister     Elevated Lipids Sister        Current Outpatient Medications   Medication Sig    rimegepant (NURTEC) 75 mg disintegrating tablet Take 1 tab every other day , then 1 tab prn for severe headache, not exceed 1 dose in 24 hrs    busPIRone (BUSPAR) 15 mg tablet Take 1 Tablet by mouth three (3) times daily. acetaZOLAMIDE SR (DIAMOX) 500 mg capsule Take 1 Capsule by mouth two (2) times a day. traZODone (DESYREL) 50 mg tablet Take 1 Tablet by mouth nightly. onabotulinumtoxinA (Botox) 200 unit injection 155 units by IM to 31 approved sites into head and neck every 12 weeks for chronic migraine. Indications: migraine prevention    QUEtiapine (SEROquel) 50 mg tablet TAKE 1 TABLET BY MOUTH EVERY NIGHT    QUEtiapine (SEROquel) 100 mg tablet TAKE 1 TABLET BY MOUTH EVERY DAY AT BEDTIME    traZODone (DESYREL) 100 mg tablet TAKE 1 TABLET BY MOUTH EVERY NIGHT    metFORMIN (GLUCOPHAGE) 1,000 mg tablet TAKE 1 TABLET BY MOUTH TWICE DAILY WITH THE MORNING AND EVENING MEAL    pregabalin (LYRICA) 200 mg capsule TAKE 1 CAPSULE BY MOUTH TWICE DAILY. MAX DAILY AMOUNT: 400 MG    QUEtiapine (SEROquel) 50 mg tablet TAKE 1 TABLET BY MOUTH EVERY NIGHT    Orilissa 200 mg tab Take 1 Tablet by mouth two (2) times a day.     EPINEPHrine (EPIPEN) 0.3 mg/0.3 mL injection ADMINISTER 0.3 ML IN THE MUSCLE 1 TIME AS NEEDED FOR ANAPHYLAXIS    clonazePAM (KlonoPIN) 1 mg tablet Take 1 Tablet by mouth nightly. Max Daily Amount: 1 mg.    butalbitaL-acetaminop-caf-cod -71-30 mg cap butalbital 50 mg-acetaminophen 300 mg-caffeine 40 mg-codeine 30 mg cap   Take 1 capsule every 4 hours by oral route.    magnesium oxide 250 mg magnesium tablet magnesium 250 mg (as magnesium oxide) tablet   TK 1 T PO BID    pantoprazole (PROTONIX) 40 mg tablet Take 40 mg by mouth daily. atenoloL (TENORMIN) 50 mg tablet Take 1 Tablet by mouth two (2) times a day. atorvastatin (LIPITOR) 20 mg tablet Take 1 Tablet by mouth daily. albuterol (PROVENTIL HFA, VENTOLIN HFA, PROAIR HFA) 90 mcg/actuation inhaler Take 1 Puff by inhalation every six (6) hours as needed for Wheezing. therapeutic multivitamin (THERAGRAN) tablet Take 1 Tablet by mouth daily. cholecalciferol (VITAMIN D3) (1000 Units /25 mcg) tablet Take 1,000 Units by mouth daily. omeprazole (PRILOSEC) 20 mg capsule Take 20 mg by mouth daily. zonisamide (ZONEGRAN) 100 mg capsule TAKE 2 CAPSULES BY MOUTH TWICE DAILY (Patient not taking: Reported on 2/6/2023)    tiZANidine (ZANAFLEX) 4 mg tablet Take one in the day and two at night (Patient not taking: Reported on 2/6/2023)    predniSONE (STERAPRED) 5 mg dose pack See administration instruction per 5mg dose pack (Patient not taking: Reported on 2/6/2023)    amoxicillin (AMOXIL) 875 mg tablet Take 875 mg by mouth every twelve (12) hours. (Patient not taking: No sig reported)    rosuvastatin (CRESTOR) 20 mg tablet Take 20 mg by mouth daily. tamsulosin (FLOMAX) 0.4 mg capsule TAKE 1 CAPSULE BY MOUTH EVERY DAY 30 MINUTES AFTER THE SAME MEAL    eletriptan (RELPAX) 40 mg tablet Take 1 Tablet by mouth daily as needed (migraine. May repeat in 40 minutes no more than 2 in 24 hours). erenumab-aooe (Aimovig Autoinjector) 140 mg/mL injection 1 mL by SubCUTAneous route every thirty (30) days. ubrogepant Randell Hamman) 100 mg tablet Take 1 tab p.o. prn for severe  Headache , not to exceed 1 dose in 24 hrs (Patient not taking: No sig reported)    butalbital-acetaminophen-caff (Fioricet) -40 mg per capsule Take 1 Capsule by mouth every four (4) hours as needed for Headache.    acyclovir (ZOVIRAX) 400 mg tablet Take 400 mg by mouth as needed. (Patient not taking: Reported on 2/6/2023)    diclofenac EC (VOLTAREN) 75 mg EC tablet diclofenac sodium 75 mg tablet,delayed release   TK 1 T PO BID    etodolac (LODINE) 400 mg tablet etodolac 400 mg tablet   TAKE 1 TABLET BY MOUTH TWICE DAILY    prochlorperazine (COMPAZINE) 10 mg tablet prochlorperazine maleate 10 mg tablet   TAKE 1 TABLET BY MOUTH THREE TIMES DAILY (Patient not taking: Reported on 2/6/2023)    promethazine (PHENERGAN) 25 mg tablet Take 25 mg by mouth every eight (8) hours as needed. metaxalone (SKELAXIN) 800 mg tablet Take 1 Tablet by mouth three (3) times daily. cyanocobalamin (VITAMIN B12) 1,000 mcg/mL injection 1,000 mcg by IntraMUSCular route every Sunday. ascorbic acid, vitamin C, (VITAMIN C) 500 mg tablet Take 500 mg by mouth daily. ondansetron hcl (ZOFRAN) 4 mg tablet TAKE 1 TABLET BY MOUTH DAILY AS NEEDED FOR NAUSEA OR VOMITING (Patient not taking: Reported on 2/6/2023)    fluticasone propion-salmeteroL (ADVAIR/WIXELA) 250-50 mcg/dose diskus inhaler Take 1 Puff by inhalation every twelve (12) hours. diphenhydrAMINE (BENADRYL) 25 mg capsule Take 25 mg by mouth as needed. Indications: Allerigc to [facility] - only takes with evette     No current facility-administered medications for this visit. Allergies:   Allergies   Allergen Reactions    Latex Other (comments)    Fentanyl Shortness of Breath     Other reaction(s): wheezing/sob  Other reaction(s): wheezing/sob  Other reaction(s): wheezing/sob      Lemon Anaphylaxis     Other reaction(s): anaphylaxis/angioedema    Adhesive Tape-Silicones Other (comments)     Pt reports it burns her skin    Dilaudid [Hydromorphone (Pf)] Itching    Percocet [Oxycodone-Acetaminophen] Nausea and Vomiting     Pt states not allergic! Review of Systems:  10 systems reviewed. See scanned sheet in \"Media\" section. See HPI for pertinent positives and negatives. Objective:   Visit Vitals  /81 (BP 1 Location: Right upper arm, BP Patient Position: Sitting, BP Cuff Size: Adult)   Pulse 63   Temp 97.5 °F (36.4 °C) (Temporal)   Resp 20   Ht 5' 1\" (1.549 m)   Wt 117.5 kg (259 lb)   SpO2 98%   BMI 48.94 kg/m²     General appearance  Alert, cooperative, no distress, appears stated age   HEENT  Anicteric   Neck Supple       Lungs   CTAB   Heart  Regular rate and rhythm. No murmur, rub or gallop   Abdomen   Soft. Bowel sounds normal. No palpable masses. Obese. Upper midline scar but no palpable hernias. Pannus. Extremities No CCE.    Pulses 2+ right radial   Skin Skin color, texture, turgor normal.        Neurologic Without overt sensory or motor deficit       Signed By: Cherri Paige MD     February 6, 2023

## 2023-02-14 ENCOUNTER — TELEPHONE (OUTPATIENT)
Dept: NEUROLOGY | Age: 48
End: 2023-02-14

## 2023-02-14 NOTE — TELEPHONE ENCOUNTER
Botox  Key: [id number]     PA Case ID: [id number]  Botox 200 units Approved   02/14/2023 - 05/14/2023  CPT 80635- No pre-auth required per rep Sergey Solis with Jim, Ref #   SPP: [facility]     Sent to [facility] to fx to [facility] [phone]    Forward to Lashon as FYI. Imported to [employer]  ac

## 2023-02-21 RX ORDER — ERENUMAB-AOOE 140 MG/ML
INJECTION, SOLUTION SUBCUTANEOUS
Qty: 1 ML | Refills: 11 | Status: SHIPPED | OUTPATIENT
Start: 2023-02-21

## 2023-02-28 DIAGNOSIS — G43.711 INTRACTABLE CHRONIC MIGRAINE WITHOUT AURA AND WITH STATUS MIGRAINOSUS: ICD-10-CM

## 2023-02-28 RX ORDER — PREDNISONE 5 MG/1
TABLET ORAL
Qty: 21 TABLET | Refills: 0 | OUTPATIENT
Start: 2023-02-28

## 2023-02-28 NOTE — TELEPHONE ENCOUNTER
Requested Prescriptions     Pending Prescriptions Disp Refills    predniSONE (STERAPRED) 5 mg dose pack 21 Tablet 0     Sig: See administration instruction per 5mg dose pack    Last fill 10/26/22   Last visit 1/11/23   Next visit 4/5/23

## 2023-03-08 DIAGNOSIS — G43.711 INTRACTABLE CHRONIC MIGRAINE WITHOUT AURA AND WITH STATUS MIGRAINOSUS: ICD-10-CM

## 2023-03-08 RX ORDER — PREDNISONE 5 MG/1
TABLET ORAL
Qty: 21 TABLET | Refills: 0 | OUTPATIENT
Start: 2023-03-08

## 2023-03-21 ENCOUNTER — TELEPHONE (OUTPATIENT)
Dept: NEUROLOGY | Age: 48
End: 2023-03-21

## 2023-03-23 ENCOUNTER — TELEPHONE (OUTPATIENT)
Dept: NEUROLOGY | Age: 48
End: 2023-03-23

## 2023-03-23 NOTE — TELEPHONE ENCOUNTER
Botox came in the office:    MFR: Lalit Medrano    TDP:[id number]    Exp: 09/2025    Appointment: 4/5/23    Provider: ZHOU Herb Bolus pharmacy:[facility]    Pharmacy Phone [address]

## 2023-03-29 ENCOUNTER — OFFICE VISIT (OUTPATIENT)
Dept: NEUROLOGY | Age: 48
End: 2023-03-29
Payer: COMMERCIAL

## 2023-03-29 VITALS
HEART RATE: 91 BPM | TEMPERATURE: 97.6 F | SYSTOLIC BLOOD PRESSURE: 140 MMHG | DIASTOLIC BLOOD PRESSURE: 93 MMHG | WEIGHT: 257.2 LBS | HEIGHT: 61 IN | OXYGEN SATURATION: 96 % | BODY MASS INDEX: 48.56 KG/M2

## 2023-03-29 DIAGNOSIS — R42 DIZZINESS: ICD-10-CM

## 2023-03-29 DIAGNOSIS — F07.81 POST CONCUSSION SYNDROME: Primary | ICD-10-CM

## 2023-03-29 DIAGNOSIS — R11.0 NAUSEA: ICD-10-CM

## 2023-03-29 DIAGNOSIS — G43.711 INTRACTABLE CHRONIC MIGRAINE WITHOUT AURA AND WITH STATUS MIGRAINOSUS: ICD-10-CM

## 2023-03-29 PROCEDURE — 3077F SYST BP >= 140 MM HG: CPT | Performed by: NURSE PRACTITIONER

## 2023-03-29 PROCEDURE — 99214 OFFICE O/P EST MOD 30 MIN: CPT | Performed by: NURSE PRACTITIONER

## 2023-03-29 PROCEDURE — 3080F DIAST BP >= 90 MM HG: CPT | Performed by: NURSE PRACTITIONER

## 2023-03-29 PROCEDURE — 96372 THER/PROPH/DIAG INJ SC/IM: CPT | Performed by: NURSE PRACTITIONER

## 2023-03-29 RX ORDER — METHYLPREDNISOLONE ACETATE 40 MG/ML
40 INJECTION, SUSPENSION INTRA-ARTICULAR; INTRALESIONAL; INTRAMUSCULAR; SOFT TISSUE ONCE
Qty: 1 ML | Refills: 0
Start: 2023-03-29 | End: 2023-03-29

## 2023-03-29 RX ORDER — METHYLPREDNISOLONE 4 MG/1
TABLET ORAL
Qty: 1 DOSE PACK | Refills: 0 | Status: SHIPPED | OUTPATIENT
Start: 2023-03-29

## 2023-03-29 NOTE — LETTER
NOTIFICATION RETURN TO WORK / SCHOOL    3/29/2023 11:37 AM    Ms. Ifeanyi Diaz [id number]      To Whom It May Concern:    Sarath Guajardo is currently under the care of [address] [place] [address]. She will return to work/school on: April 3, 2023    If there are questions or concerns please have the patient contact our office.         Sincerely,      Mariangel Patel NP

## 2023-03-29 NOTE — PROGRESS NOTES
Chief Complaint   Patient presents with    Hospital Follow Up     Concussion     Migraine     1. Have you been to the ER, urgent care clinic since your last visit? Yes [facility]  due to trauma to head Hospitalized since your last visit? NO     2. Have you seen or consulted any other health care providers outside of the [address] [place] [address] since your last visit? No   Include any pap smears or colon screening. No     Patient C/O migraines increase to daily since concussion.

## 2023-03-29 NOTE — PROGRESS NOTES
[facility]  [address]  Tel: [phone]  Fax: [phone]      Date:  23     Name:  Ajay Lemus  :  1975  MRN:  [id number]     PCP:  Dexter Villarreal MD    Chief Complaint   Patient presents with    Hospital Follow Up     Concussion     Migraine       HISTORY OF PRESENT ILLNESS:  Patient presents today for worsening symptoms and headaches/migraines, she notes that on 3/17/2023 she suffered a concussion: pt was going up the ramp to put a wheelchair in the [place], Hit head on the van, No LOC, she stumbled back, saw stars and had to sit down. Went to Urgent care then went to [facility] shortly after, imaging was done, discharged home. Pt was having dizziness the Monday after martinez, so she went back to Urgent care, placed out of work until she was seen by us. Patient is currently none of the medications are helping, she has tried Taking Tylenol and Advil. Nurtec not helping. Ubrelvy not helping. Toradol doesn't help. Was doing well with the Botox prior to the concussion. Now she complains of worsening headaches migraines as well as dizziness and nausea. REVIEW OF SYSTEMS:     Review of Systems   HENT:          C/o sound sensivity   Eyes:  Positive for photophobia. Gastrointestinal:  Positive for nausea (phenergan prn.). Negative for vomiting. Musculoskeletal:  Negative for falls and neck pain. Neurological:  Positive for dizziness (at times, standing long periods of times, or walking) and headaches. Negative for seizures. All other systems reviewed and are negative. Current Outpatient Medications   Medication Sig    methylPREDNISolone acetate (DEPO-MEDROL) 40 mg/mL injection 1 mL by IntraMUSCular route once for 1 dose.  Indications: migraine    methylPREDNISolone (MEDROL DOSEPACK) 4 mg tablet Take as directed    Aimovig Autoinjector 140 mg/mL injection ADMINISTER 1 ML UNDER THE SKIN EVERY 30 DAYS rimegepant (NURTEC) 75 mg disintegrating tablet Take 1 tab every other day , then 1 tab prn for severe headache, not exceed 1 dose in 24 hrs    busPIRone (BUSPAR) 15 mg tablet Take 1 Tablet by mouth three (3) times daily. zonisamide (ZONEGRAN) 100 mg capsule TAKE 2 CAPSULES BY MOUTH TWICE DAILY    acetaZOLAMIDE SR (DIAMOX) 500 mg capsule Take 1 Capsule by mouth two (2) times a day. tiZANidine (ZANAFLEX) 4 mg tablet Take one in the day and two at night    traZODone (DESYREL) 50 mg tablet Take 1 Tablet by mouth nightly. onabotulinumtoxinA (Botox) 200 unit injection 155 units by IM to 31 approved sites into head and neck every 12 weeks for chronic migraine. Indications: migraine prevention    QUEtiapine (SEROquel) 50 mg tablet TAKE 1 TABLET BY MOUTH EVERY NIGHT    QUEtiapine (SEROquel) 100 mg tablet TAKE 1 TABLET BY MOUTH EVERY DAY AT BEDTIME    traZODone (DESYREL) 100 mg tablet TAKE 1 TABLET BY MOUTH EVERY NIGHT    metFORMIN (GLUCOPHAGE) 1,000 mg tablet TAKE 1 TABLET BY MOUTH TWICE DAILY WITH THE MORNING AND EVENING MEAL    pregabalin (LYRICA) 200 mg capsule TAKE 1 CAPSULE BY MOUTH TWICE DAILY. MAX DAILY AMOUNT: 400 MG    Orilissa 200 mg tab Take 1 Tablet by mouth two (2) times a day. EPINEPHrine (EPIPEN) 0.3 mg/0.3 mL injection ADMINISTER 0.3 ML IN THE MUSCLE 1 TIME AS NEEDED FOR ANAPHYLAXIS    rosuvastatin (CRESTOR) 20 mg tablet Take 20 mg by mouth daily. tamsulosin (FLOMAX) 0.4 mg capsule TAKE 1 CAPSULE BY MOUTH EVERY DAY 30 MINUTES AFTER THE SAME MEAL    eletriptan (RELPAX) 40 mg tablet Take 1 Tablet by mouth daily as needed (migraine. May repeat in 40 minutes no more than 2 in 24 hours). clonazePAM (KlonoPIN) 1 mg tablet Take 1 Tablet by mouth nightly. Max Daily Amount: 1 mg.    butalbital-acetaminophen-caff (Fioricet) -40 mg per capsule Take 1 Capsule by mouth every four (4) hours as needed for Headache.    acyclovir (ZOVIRAX) 400 mg tablet Take 400 mg by mouth as needed.     diclofenac EC (VOLTAREN) 75 mg EC tablet diclofenac sodium 75 mg tablet,delayed release   TK 1 T PO BID    etodolac (LODINE) 400 mg tablet etodolac 400 mg tablet   TAKE 1 TABLET BY MOUTH TWICE DAILY    magnesium oxide 250 mg magnesium tablet magnesium 250 mg (as magnesium oxide) tablet   TK 1 T PO BID    pantoprazole (PROTONIX) 40 mg tablet Take 40 mg by mouth daily. prochlorperazine (COMPAZINE) 10 mg tablet     promethazine (PHENERGAN) 25 mg tablet Take 25 mg by mouth every eight (8) hours as needed. metaxalone (SKELAXIN) 800 mg tablet Take 1 Tablet by mouth three (3) times daily. atenoloL (TENORMIN) 50 mg tablet Take 1 Tablet by mouth two (2) times a day. atorvastatin (LIPITOR) 20 mg tablet Take 1 Tablet by mouth daily. cyanocobalamin (VITAMIN B12) 1,000 mcg/mL injection 1,000 mcg by IntraMUSCular route every Sunday. albuterol (PROVENTIL HFA, VENTOLIN HFA, PROAIR HFA) 90 mcg/actuation inhaler Take 1 Puff by inhalation every six (6) hours as needed for Wheezing. therapeutic multivitamin (THERAGRAN) tablet Take 1 Tablet by mouth daily. ascorbic acid, vitamin C, (VITAMIN C) 500 mg tablet Take 500 mg by mouth daily. cholecalciferol (VITAMIN D3) (1000 Units /25 mcg) tablet Take 1,000 Units by mouth daily. ondansetron hcl (ZOFRAN) 4 mg tablet TAKE 1 TABLET BY MOUTH DAILY AS NEEDED FOR NAUSEA OR VOMITING    omeprazole (PRILOSEC) 20 mg capsule Take 20 mg by mouth daily. fluticasone propion-salmeteroL (ADVAIR/WIXELA) 250-50 mcg/dose diskus inhaler Take 1 Puff by inhalation every twelve (12) hours. diphenhydrAMINE (BENADRYL) 25 mg capsule Take 25 mg by mouth as needed. Indications: Allerigc to [facility] - only takes with evette     No current facility-administered medications for this visit.      Allergies   Allergen Reactions    Latex Other (comments)    Fentanyl Shortness of Breath     Other reaction(s): wheezing/sob  Other reaction(s): wheezing/sob  Other reaction(s): wheezing/sob      Lemon Anaphylaxis     Other reaction(s): anaphylaxis/angioedema    Adhesive Tape-Silicones Other (comments)     Pt reports it burns her skin    Dilaudid [Hydromorphone (Pf)] Itching    Percocet [Oxycodone-Acetaminophen] Nausea and Vomiting     Pt states not allergic!      Past Medical History:   Diagnosis Date    Asthma     Concussion 03/2023    Depression     Diabetes (HCC)     Fibromyalgia     GERD (gastroesophageal reflux disease)     Hypercholesterolemia     Hypertension     Lower back injury 02/13/2017    Migraines     Nausea and vomiting 01/14/2014    Other ill-defined conditions(799.89)     cerebral tumor, endometriosis, fibromyalgia, herpes    Right lower quadrant abdominal mass 01/14/2014    Seizures (HCC)     Sleep apnea     intolerant to CPAP    Unspecified adverse effect of anesthesia     pt states that she has an asthma attack when coming out of  anesthesia     Past Surgical History:   Procedure Laterality Date    HX CHOLECYSTECTOMY      HX HEENT      wisdom teeth    HX OTHER SURGICAL      laproscopy on ovaries    HX OTHER SURGICAL      left wrist ganglion cystectomy    HX OTHER SURGICAL      lung mass removed    HX TOTAL COLECTOMY  3/14/2013    Rt colectomy for perf transverse diverticulum     Social History     Socioeconomic History    Marital status: LEGALLY      Spouse name: Not on file    Number of children: Not on file    Years of education: Not on file    Highest education level: Not on file   Occupational History    Not on file   Tobacco Use    Smoking status: Never    Smokeless tobacco: Never    Tobacco comments:     vapor   Vaping Use    Vaping Use: Never used   Substance and Sexual Activity    Alcohol use: Not Currently     Comment: ocasionally    Drug use: Not on file     Comment: occ    Sexual activity: Not on file   Other Topics Concern    Not on file   Social History Narrative    Not on file     Social Determinants of Health     Financial Resource Strain: Not on file   Food Insecurity: Not on file   Transportation Needs: Not on file   Physical Activity: Not on file   Stress: Not on file   Social Connections: Not on file   Intimate Partner Violence: Not on file   Housing Stability: Not on file     Family History   Problem Relation Age of Onset    Other Other         unable to obtain due to AMS    Hypertension Father     Heart Disease Father     Cancer Father     Elevated Lipids Father     Hypertension Mother     Diabetes Maternal Grandmother     Diabetes Maternal Grandfather     Lupus Sister     Hypertension Sister     Elevated Lipids Sister          PHYSICAL EXAMINATION:    Visit Vitals  BP (!) 140/93 (BP 1 Location: Left upper arm, BP Patient Position: Sitting, BP Cuff Size: Adult)   Pulse 91   Temp 97.6 °F (36.4 °C) (Temporal)   Ht 5' 1\" (1.549 m)   Wt 257 lb 3.2 oz (116.7 kg)   SpO2 96%   BMI 48.60 kg/m²       General:  Well defined, nourished, and well groomed individual in no acute distress. Neck: Supple, slight tenderness noted along cervical spine as well as bilateral trapezius muscles, normal range of motion. Positive Spurling. Musculoskeletal:  Extremities revealed no edema and had full range of motion of joints. Psych:  Good mood and bright affect    NEUROLOGICAL EXAMINATION:     Mental Status:   Alert and oriented to person, place, and time with recent and remote memory intact. Attention span and concentration are normal. Clear speech. Fund of knowledge preserved. Cranial Nerves:   PERRLA. Visual fields were full  EOM: no evidence of nystagmus  Facial sensation:  normal and symmetric  Facial motor: normal and symmetric, no facial droop noted. Hearing intact  SCM strength intact  Tongue: midline without fasciculations    Motor Examination: Normal tone. 5/5 muscle strength in bilateral upper extremities. No muscle wasting, no twitching or fasciculation noted. Sensory exam:  Normal throughout to light touch in bilateral upper and lower extremities.     Coordination: Finger to nose and rapid arm movement testing was normal.  No resting or intention tremor. Negative Romberg, negative pronator drift. Gait and Station:  Steady gait, had slight difficulty with tandem walking. Normal arm swing. Reflexes:  DTRs 2+ in bilateral biceps, brachioradialis, patella and ankle. No clonus noted. ASSESSMENT AND PLAN      ICD-10-CM ICD-9-CM    1. Post concussion syndrome  F07.81 310.2 METHYLPREDNISOLONE ACETATE INJECTION 40 MG      MT THERAPEUTIC PROPHYLACTIC/DX INJECTION SUBQ/IM      methylPREDNISolone acetate (DEPO-MEDROL) 40 mg/mL injection      methylPREDNISolone (MEDROL DOSEPACK) 4 mg tablet      REFERRAL TO PHYSICAL THERAPY      2. Intractable chronic migraine without aura and with status migrainosus  G43.711 346.73 methylPREDNISolone (MEDROL DOSEPACK) 4 mg tablet      REFERRAL TO PHYSICAL THERAPY      3. Dizziness  R42 780.4 methylPREDNISolone (MEDROL DOSEPACK) 4 mg tablet      REFERRAL TO PHYSICAL THERAPY      4. Nausea  R11.0 787.02         1. Post concussion syndrome: No additional imaging is necessary at the time she was evaluated by the emergency room and they completed a head CT. I have ordered physical therapy for her to work in a concussion program to help alleviate some of her symptoms. Patient did receive a injection of Depo-Medrol today in the office as well as a prescription for a Medrol Dosepak to begin tomorrow if her symptoms persist.  -     METHYLPREDNISOLONE ACETATE INJECTION 40 MG  -     MT THERAPEUTIC PROPHYLACTIC/DX INJECTION SUBQ/IM  -     methylPREDNISolone acetate (DEPO-MEDROL) 40 mg/mL injection; 1 mL by IntraMUSCular route once for 1 dose. Indications: migraine, No Print, Disp-1 mL, R-0  -     methylPREDNISolone (MEDROL DOSEPACK) 4 mg tablet; Take as directed, Normal, Disp-1 Dose Pack, R-0  -     REFERRAL TO PHYSICAL THERAPY  2.  Intractable chronic migraine without aura and with status migrainosus: Prior to patient's concussion she does note that the Botox was helpful, she is slated to repeat these injections next week, hopefully we can get her migraine headaches settled down in the meantime. Steroid injection as well as a steroid Dosepak were prescribed for the acute flareup after her concussion, patient is to continue all other medications. -     methylPREDNISolone (MEDROL DOSEPACK) 4 mg tablet; Take as directed, Normal, Disp-1 Dose Pack, R-0  -     REFERRAL TO PHYSICAL THERAPY  3. Dizziness: Physical therapy has been ordered, patient is to continue monitor signs and symptoms, defer any supplemental meds. -     methylPREDNISolone (MEDROL DOSEPACK) 4 mg tablet; Take as directed, Normal, Disp-1 Dose Pack, R-0  -     REFERRAL TO PHYSICAL THERAPY  4. Nausea: Patient notes she does have Phenergan prescription she can take as needed. Patient and/or family verbalized understand of all instructions and all questions/concerns were addressed. Safety/side effects of medications discussed. Patient remains a complex patient secondary to polypharmacy, significant comorbid conditions, and use of high-risk medications which complicate the decision making process related to patient's neurologic diagnosis. We will see the patient back in 1 week , sooner if needed.       Nery Horne, FNP-BC

## 2023-04-01 ENCOUNTER — HOSPITAL ENCOUNTER (EMERGENCY)
Age: 48
Discharge: HOME | End: 2023-04-01
Attending: EMERGENCY MEDICINE
Payer: COMMERCIAL

## 2023-04-01 VITALS
SYSTOLIC BLOOD PRESSURE: 118 MMHG | OXYGEN SATURATION: 95 % | HEART RATE: 70 BPM | BODY MASS INDEX: 51.07 KG/M2 | RESPIRATION RATE: 18 BRPM | DIASTOLIC BLOOD PRESSURE: 81 MMHG | WEIGHT: 260.14 LBS | TEMPERATURE: 97.5 F | HEIGHT: 60 IN

## 2023-04-01 DIAGNOSIS — G40.909 SEIZURE DISORDER (HCC): Primary | ICD-10-CM

## 2023-04-01 DIAGNOSIS — R51.9 CHRONIC INTRACTABLE HEADACHE, UNSPECIFIED HEADACHE TYPE: ICD-10-CM

## 2023-04-01 DIAGNOSIS — G89.29 CHRONIC INTRACTABLE HEADACHE, UNSPECIFIED HEADACHE TYPE: ICD-10-CM

## 2023-04-01 LAB
ALBUMIN SERPL-MCNC: 3.1 G/DL (ref 3.5–5)
ALBUMIN/GLOB SERPL: 0.8 (ref 1.1–2.2)
ALP SERPL-CCNC: 104 U/L (ref 45–117)
ALT SERPL-CCNC: 35 U/L (ref 12–78)
ANION GAP SERPL CALC-SCNC: 5 MMOL/L (ref 5–15)
APPEARANCE UR: ABNORMAL
AST SERPL-CCNC: 21 U/L (ref 15–37)
BACTERIA URNS QL MICRO: ABNORMAL /HPF
BASOPHILS # BLD: 0.1 K/UL (ref 0–0.1)
BASOPHILS NFR BLD: 1 % (ref 0–1)
BILIRUB SERPL-MCNC: 0.2 MG/DL (ref 0.2–1)
BILIRUB UR QL: NEGATIVE
BUN SERPL-MCNC: 25 MG/DL (ref 6–20)
BUN/CREAT SERPL: 23 (ref 12–20)
CALCIUM SERPL-MCNC: 9.1 MG/DL (ref 8.5–10.1)
CHLORIDE SERPL-SCNC: 111 MMOL/L (ref 97–108)
CO2 SERPL-SCNC: 25 MMOL/L (ref 21–32)
COLOR UR: ABNORMAL
CREAT SERPL-MCNC: 1.09 MG/DL (ref 0.55–1.02)
DIFFERENTIAL METHOD BLD: ABNORMAL
EOSINOPHIL # BLD: 0.1 K/UL (ref 0–0.4)
EOSINOPHIL NFR BLD: 1 % (ref 0–7)
EPITH CASTS URNS QL MICRO: ABNORMAL /LPF
ERYTHROCYTE [DISTWIDTH] IN BLOOD BY AUTOMATED COUNT: 15.8 % (ref 11.5–14.5)
GLOBULIN SER CALC-MCNC: 4.1 G/DL (ref 2–4)
GLUCOSE BLD STRIP.AUTO-MCNC: 96 MG/DL (ref 65–117)
GLUCOSE SERPL-MCNC: 112 MG/DL (ref 65–100)
GLUCOSE UR STRIP.AUTO-MCNC: NEGATIVE MG/DL
HCG UR QL: NEGATIVE
HCT VFR BLD AUTO: 42.4 % (ref 35–47)
HGB BLD-MCNC: 12.8 G/DL (ref 11.5–16)
HGB UR QL STRIP: NEGATIVE
HYALINE CASTS URNS QL MICRO: ABNORMAL /LPF (ref 0–2)
IMM GRANULOCYTES # BLD AUTO: 0 K/UL (ref 0–0.04)
IMM GRANULOCYTES NFR BLD AUTO: 0 % (ref 0–0.5)
KETONES UR QL STRIP.AUTO: NEGATIVE MG/DL
LEUKOCYTE ESTERASE UR QL STRIP.AUTO: ABNORMAL
LYMPHOCYTES # BLD: 2.1 K/UL (ref 0.8–3.5)
LYMPHOCYTES NFR BLD: 24 % (ref 12–49)
MCH RBC QN AUTO: 27.2 PG (ref 26–34)
MCHC RBC AUTO-ENTMCNC: 30.2 G/DL (ref 30–36.5)
MCV RBC AUTO: 90.2 FL (ref 80–99)
MONOCYTES # BLD: 0.7 K/UL (ref 0–1)
MONOCYTES NFR BLD: 7 % (ref 5–13)
NEUTS SEG # BLD: 6 K/UL (ref 1.8–8)
NEUTS SEG NFR BLD: 67 % (ref 32–75)
NITRITE UR QL STRIP.AUTO: NEGATIVE
NRBC # BLD: 0 K/UL (ref 0–0.01)
NRBC BLD-RTO: 0 PER 100 WBC
PH UR STRIP: 8 (ref 5–8)
PLATELET # BLD AUTO: 257 K/UL (ref 150–400)
PMV BLD AUTO: 10.5 FL (ref 8.9–12.9)
POTASSIUM SERPL-SCNC: 3.8 MMOL/L (ref 3.5–5.1)
PROT SERPL-MCNC: 7.2 G/DL (ref 6.4–8.2)
PROT UR STRIP-MCNC: ABNORMAL MG/DL
RBC # BLD AUTO: 4.7 M/UL (ref 3.8–5.2)
RBC #/AREA URNS HPF: ABNORMAL /HPF (ref 0–5)
SERVICE CMNT-IMP: NORMAL
SODIUM SERPL-SCNC: 141 MMOL/L (ref 136–145)
SP GR UR REFRACTOMETRY: 1.02
UA: UC IF INDICATED,UAUC: ABNORMAL
UROBILINOGEN UR QL STRIP.AUTO: 1 EU/DL (ref 0.2–1)
WBC # BLD AUTO: 9 K/UL (ref 3.6–11)
WBC URNS QL MICRO: ABNORMAL /HPF (ref 0–4)

## 2023-04-01 PROCEDURE — 74011250636 HC RX REV CODE- 250/636: Performed by: EMERGENCY MEDICINE

## 2023-04-01 PROCEDURE — 96375 TX/PRO/DX INJ NEW DRUG ADDON: CPT

## 2023-04-01 PROCEDURE — 81001 URINALYSIS AUTO W/SCOPE: CPT

## 2023-04-01 PROCEDURE — 74011250637 HC RX REV CODE- 250/637: Performed by: EMERGENCY MEDICINE

## 2023-04-01 PROCEDURE — 85025 COMPLETE CBC W/AUTO DIFF WBC: CPT

## 2023-04-01 PROCEDURE — 80053 COMPREHEN METABOLIC PANEL: CPT

## 2023-04-01 PROCEDURE — 96374 THER/PROPH/DIAG INJ IV PUSH: CPT

## 2023-04-01 PROCEDURE — 81025 URINE PREGNANCY TEST: CPT

## 2023-04-01 PROCEDURE — 99284 EMERGENCY DEPT VISIT MOD MDM: CPT

## 2023-04-01 PROCEDURE — 82962 GLUCOSE BLOOD TEST: CPT

## 2023-04-01 PROCEDURE — 36415 COLL VENOUS BLD VENIPUNCTURE: CPT

## 2023-04-01 RX ORDER — KETOROLAC TROMETHAMINE 30 MG/ML
15 INJECTION, SOLUTION INTRAMUSCULAR; INTRAVENOUS
Status: COMPLETED | OUTPATIENT
Start: 2023-04-01 | End: 2023-04-01

## 2023-04-01 RX ORDER — OXYCODONE AND ACETAMINOPHEN 5; 325 MG/1; MG/1
1 TABLET ORAL
Status: COMPLETED | OUTPATIENT
Start: 2023-04-01 | End: 2023-04-01

## 2023-04-01 RX ORDER — ONDANSETRON 2 MG/ML
4 INJECTION INTRAMUSCULAR; INTRAVENOUS
Status: COMPLETED | OUTPATIENT
Start: 2023-04-01 | End: 2023-04-01

## 2023-04-01 RX ADMIN — OXYCODONE AND ACETAMINOPHEN 1 TABLET: 5; 325 TABLET ORAL at 22:24

## 2023-04-01 RX ADMIN — SODIUM CHLORIDE 1000 ML: 9 INJECTION, SOLUTION INTRAVENOUS at 19:37

## 2023-04-01 RX ADMIN — OXYCODONE AND ACETAMINOPHEN 1 TABLET: 5; 325 TABLET ORAL at 20:49

## 2023-04-01 RX ADMIN — KETOROLAC TROMETHAMINE 15 MG: 30 INJECTION, SOLUTION INTRAMUSCULAR at 20:01

## 2023-04-01 RX ADMIN — ONDANSETRON 4 MG: 2 INJECTION INTRAMUSCULAR; INTRAVENOUS at 19:59

## 2023-04-01 NOTE — ED NOTES
Pt has hx of Cerebral Tumor and seizure disorder. Pt having difficulty recalling some information but is a/o x4. Pt states she normally has seizures during the night and has a 4-24 hour recovery period. Pt states her aura is not better and that she feel as if something is wrong.  reports pt having jerking motions which is new for pt and not apart of her normal presentation and pt has repeatative language at dinner. Notified Dr. Iram Patel of Neuro deficits and checked BS.

## 2023-04-02 NOTE — ED PROVIDER NOTES
[facility] EMERGENCY DEPT  EMERGENCY DEPARTMENT ENCOUNTER       Pt Name: Sander Moulton  MRN: [id number]  [place] 1975  Date of evaluation: 4/1/2023  Provider: Augsut Ortega MD   PCP: Tia Slater MD  Note Started: 12:10 AM 4/2/23     CHIEF COMPLAINT       Chief Complaint   Patient presents with    Seizure     Pt. Had a seizure around 4pm, unsure how long it lasted. States she normally has seizures at night and it takes about a day to go back to her normal self. States the postictal period lasts for a long time. Pt. Able to complete sentences and answer questions very slowly. HISTORY OF PRESENT ILLNESS: 1 or more elements      History From: ***, History limited by: ***none     Sander Moulton is a 50 y.o. female ***       Please See MDM for Additional Details of the HPI/PMH  Nursing Notes were all reviewed and agreed with or any disagreements were addressed in the HPI. REVIEW OF SYSTEMS        Positives and Pertinent negatives as per HPI.     PAST HISTORY     Past Medical History:  Past Medical History:   Diagnosis Date    Asthma     Concussion 03/2023    Depression     Diabetes ([address]     Fibromyalgia     GERD (gastroesophageal reflux disease)     Hypercholesterolemia     Hypertension     Lower back injury 02/13/2017    Migraines     Nausea and vomiting 01/14/2014    Other ill-defined conditions(799.89)     cerebral tumor, endometriosis, fibromyalgia, herpes    Right lower quadrant abdominal mass 01/14/2014    Seizures ([address]     Sleep apnea     intolerant to CPAP    Unspecified adverse effect of anesthesia     pt states that she has an asthma attack when coming out of  anesthesia       Past Surgical History:  Past Surgical History:   Procedure Laterality Date    HX CHOLECYSTECTOMY      HX HEENT      wisdom teeth    HX OTHER SURGICAL      laproscopy on ovaries    HX OTHER SURGICAL      left wrist ganglion cystectomy    HX OTHER SURGICAL      lung mass removed    HX TOTAL COLECTOMY 3/14/2013    Rt colectomy for perf transverse diverticulum       Family History:  Family History   Problem Relation Age of Onset    Other Other         unable to obtain due to AMS    Hypertension Father     Heart Disease Father     Cancer Father     Elevated Lipids Father     Hypertension Mother     Diabetes Maternal Grandmother     Diabetes Maternal Grandfather     Lupus Sister     Hypertension Sister     Elevated Lipids Sister        Social History:  Social History     Tobacco Use    Smoking status: Never    Smokeless tobacco: Never    Tobacco comments:     vapor   Vaping Use    Vaping Use: Never used   Substance Use Topics    Alcohol use: Not Currently     Comment: ocasionally       Allergies: Allergies   Allergen Reactions    Latex Other (comments)    Fentanyl Shortness of Breath     Other reaction(s): wheezing/sob  Other reaction(s): wheezing/sob  Other reaction(s): wheezing/sob      Lemon Anaphylaxis     Other reaction(s): anaphylaxis/angioedema    Adhesive Tape-Silicones Other (comments)     Pt reports it burns her skin    Dilaudid [Hydromorphone (Pf)] Itching    Percocet [Oxycodone-Acetaminophen] Nausea and Vomiting     Pt states not allergic! CURRENT MEDICATIONS      Discharge Medication List as of 4/1/2023 10:34 PM        CONTINUE these medications which have NOT CHANGED    Details   methylPREDNISolone (MEDROL DOSEPACK) 4 mg tablet Take as directed, Normal, Disp-1 Dose Pack, R-0      Aimovig Autoinjector 140 mg/mL injection ADMINISTER 1 ML UNDER THE SKIN EVERY 30 DAYS, Normal, Disp-1 mL, R-11      rimegepant (NURTEC) 75 mg disintegrating tablet Take 1 tab every other day , then 1 tab prn for severe headache, not exceed 1 dose in 24 hrs, Normal, Disp-48 Tablet, R-4      busPIRone (BUSPAR) 15 mg tablet Take 1 Tablet by mouth three (3) times daily. , Normal, Disp-270 Tablet, R-3      zonisamide (ZONEGRAN) 100 mg capsule TAKE 2 CAPSULES BY MOUTH TWICE DAILY, Normal, Disp-360 Capsule, R-3 acetaZOLAMIDE SR (DIAMOX) 500 mg capsule Take 1 Capsule by mouth two (2) times a day., Normal, Disp-180 Capsule, R-3      tiZANidine (ZANAFLEX) 4 mg tablet Take one in the day and two at night, Normal, Disp-90 Tablet, R-3      !! traZODone (DESYREL) 50 mg tablet Take 1 Tablet by mouth nightly., Normal, Disp-30 Tablet, R-11      onabotulinumtoxinA (Botox) 200 unit injection 155 units by IM to 31 approved sites into head and neck every 12 weeks for chronic migraine. Indications: migraine prevention, Normal, Disp-200 Units, R-3      !! QUEtiapine (SEROquel) 50 mg tablet TAKE 1 TABLET BY MOUTH EVERY NIGHT, Normal, Disp-90 Tablet, R-3      !! QUEtiapine (SEROquel) 100 mg tablet TAKE 1 TABLET BY MOUTH EVERY DAY AT BEDTIME, Normal, Disp-30 Tablet, R-5      !! traZODone (DESYREL) 100 mg tablet TAKE 1 TABLET BY MOUTH EVERY NIGHT, Normal, Disp-90 Tablet, R-3      metFORMIN (GLUCOPHAGE) 1,000 mg tablet TAKE 1 TABLET BY MOUTH TWICE DAILY WITH THE MORNING AND EVENING MEAL, Normal, Disp-240 Tablet, R-1      pregabalin (LYRICA) 200 mg capsule TAKE 1 CAPSULE BY MOUTH TWICE DAILY. MAX DAILY AMOUNT: 400 MG, Normal, Disp-180 Capsule, R-5      Orilissa 200 mg tab Take 1 Tablet by mouth two (2) times a day., Historical Med, YURY      EPINEPHrine (EPIPEN) 0.3 mg/0.3 mL injection ADMINISTER 0.3 ML IN THE MUSCLE 1 TIME AS NEEDED FOR ANAPHYLAXIS, Historical Med      rosuvastatin (CRESTOR) 20 mg tablet Take 20 mg by mouth daily. , Historical Med      tamsulosin (FLOMAX) 0.4 mg capsule TAKE 1 CAPSULE BY MOUTH EVERY DAY 30 MINUTES AFTER THE SAME MEAL, Historical Med      eletriptan (RELPAX) 40 mg tablet Take 1 Tablet by mouth daily as needed (migraine. May repeat in 40 minutes no more than 2 in 24 hours). , Normal, Disp-36 Tablet, R-1      clonazePAM (KlonoPIN) 1 mg tablet Take 1 Tablet by mouth nightly.  Max Daily Amount: 1 mg., Normal, Disp-30 Tablet, R-2      butalbital-acetaminophen-caff (Fioricet) -40 mg per capsule Take 1 Capsule by mouth every four (4) hours as needed for Headache., Normal, Disp-10 Capsule, R-0      acyclovir (ZOVIRAX) 400 mg tablet Take 400 mg by mouth as needed., Historical Med      diclofenac EC (VOLTAREN) 75 mg EC tablet diclofenac sodium 75 mg tablet,delayed release   TK 1 T PO BID, Historical Med      etodolac (LODINE) 400 mg tablet etodolac 400 mg tablet   TAKE 1 TABLET BY MOUTH TWICE DAILY, Historical Med      magnesium oxide 250 mg magnesium tablet magnesium 250 mg (as magnesium oxide) tablet   TK 1 T PO BID, Historical Med      pantoprazole (PROTONIX) 40 mg tablet Take 40 mg by mouth daily. , Historical Med      prochlorperazine (COMPAZINE) 10 mg tablet Historical Med      promethazine (PHENERGAN) 25 mg tablet Take 25 mg by mouth every eight (8) hours as needed., Historical Med      metaxalone (SKELAXIN) 800 mg tablet Take 1 Tablet by mouth three (3) times daily. , Normal, Disp-270 Tablet, R-3      atenoloL (TENORMIN) 50 mg tablet Take 1 Tablet by mouth two (2) times a day., Normal, Disp-180 Tablet, R-3      atorvastatin (LIPITOR) 20 mg tablet Take 1 Tablet by mouth daily. , Normal, Disp-90 Tablet, R-3      cyanocobalamin (VITAMIN B12) 1,000 mcg/mL injection 1,000 mcg by IntraMUSCular route every Sunday., Historical Med      albuterol (PROVENTIL HFA, VENTOLIN HFA, PROAIR HFA) 90 mcg/actuation inhaler Take 1 Puff by inhalation every six (6) hours as needed for Wheezing., Historical Med      therapeutic multivitamin (THERAGRAN) tablet Take 1 Tablet by mouth daily. , Historical Med      ascorbic acid, vitamin C, (VITAMIN C) 500 mg tablet Take 500 mg by mouth daily. , Historical Med      cholecalciferol (VITAMIN D3) (1000 Units /25 mcg) tablet Take 1,000 Units by mouth daily. , Historical Med      ondansetron hcl (ZOFRAN) 4 mg tablet TAKE 1 TABLET BY MOUTH DAILY AS NEEDED FOR NAUSEA OR VOMITING, Normal, Disp-90 Tablet, R-3      omeprazole (PRILOSEC) 20 mg capsule Take 20 mg by mouth daily. , Historical Med      fluticasone propion-salmeteroL (ADVAIR/WIXELA) 250-50 mcg/dose diskus inhaler Take 1 Puff by inhalation every twelve (12) hours. , Historical Med      diphenhydrAMINE (BENADRYL) 25 mg capsule Take 25 mg by mouth as needed. Indications: [facility]erig[facility] to [facility] - only takes with evette, Historical Med       !! - Potential duplicate medications found. Please discuss with provider. SCREENINGS               No data recorded         PHYSICAL EXAM      ED Triage Vitals [04/01/23 1834]   ED Encounter Vitals Group      /81      Pulse (Heart Rate) 70      Resp Rate 18      Temp 97.5 °F (36.4 °C)      Temp src       O2 Sat (%) 95 %      Weight 260 lb 2.3 oz      Height 5'        Physical Exam     DIAGNOSTIC RESULTS   LABS:     Recent Results (from the past 12 hour(s))   GLUCOSE, POC    Collection Time: 04/01/23  6:52 PM   Result Value Ref Range    Glucose (POC) 96 65 - 117 mg/dL    Performed by Gaby Sanchez EDT    CBC WITH AUTOMATED DIFF    Collection Time: 04/01/23  7:36 PM   Result Value Ref Range    WBC 9.0 3.6 - 11.0 K/uL    RBC 4.70 3.80 - 5.20 M/uL    HGB 12.8 11.5 - 16.0 g/dL    HCT 42.4 35.0 - 47.0 %    MCV 90.2 80.0 - 99.0 FL    MCH 27.2 26.0 - 34.0 PG    MCHC 30.2 30.0 - 36.5 g/dL    RDW 15.8 (H) 11.5 - 14.5 %    PLATELET 876 894 - 710 K/uL    MPV 10.5 8.9 - 12.9 FL    NRBC 0.0 0  WBC    ABSOLUTE NRBC 0.00 0.00 - 0.01 K/uL    NEUTROPHILS 67 32 - 75 %    LYMPHOCYTES 24 12 - 49 %    MONOCYTES 7 5 - 13 %    EOSINOPHILS 1 0 - 7 %    BASOPHILS 1 0 - 1 %    IMMATURE GRANULOCYTES 0 0.0 - 0.5 %    ABS. NEUTROPHILS 6.0 1.8 - 8.0 K/UL    ABS. LYMPHOCYTES 2.1 0.8 - 3.5 K/UL    ABS. MONOCYTES 0.7 0.0 - 1.0 K/UL    ABS. EOSINOPHILS 0.1 0.0 - 0.4 K/UL    ABS. BASOPHILS 0.1 0.0 - 0.1 K/UL    ABS. IMM.  GRANS. 0.0 0.00 - 0.04 K/UL    DF AUTOMATED     METABOLIC PANEL, COMPREHENSIVE    Collection Time: 04/01/23  7:36 PM   Result Value Ref Range    Sodium 141 136 - 145 mmol/L    Potassium 3.8 3.5 - 5.1 mmol/L    Chloride 111 (H) 97 - 108 mmol/L    CO2 25 21 - 32 mmol/L    Anion gap 5 5 - 15 mmol/L    Glucose 112 (H) 65 - 100 mg/dL    BUN 25 (H) 6 - 20 MG/DL    Creatinine 1.09 (H) 0.55 - 1.02 MG/DL    BUN/Creatinine ratio 23 (H) 12 - 20      eGFR >60 >60 ml/min/1.73m2    Calcium 9.1 8.5 - 10.1 MG/DL    Bilirubin, total 0.2 0.2 - 1.0 MG/DL    ALT (SGPT) 35 12 - 78 U/L    AST (SGOT) 21 15 - 37 U/L    Alk. phosphatase 104 45 - 117 U/L    Protein, total 7.2 6.4 - 8.2 g/dL    Albumin 3.1 (L) 3.5 - 5.0 g/dL    Globulin 4.1 (H) 2.0 - 4.0 g/dL    A-G Ratio 0.8 (L) 1.1 - 2.2     HCG URINE, QL. - POC    Collection Time: 04/01/23  8:19 PM   Result Value Ref Range    Pregnancy test,urine (POC) Negative NEG     URINALYSIS W/ REFLEX CULTURE    Collection Time: 04/01/23  8:20 PM    Specimen: Urine   Result Value Ref Range    Color YELLOW/STRAW      Appearance CLOUDY (A) CLEAR      Specific gravity 1.024      pH (UA) 8.0 5.0 - 8.0      Protein TRACE (A) NEG mg/dL    Glucose Negative NEG mg/dL    Ketone Negative NEG mg/dL    Bilirubin Negative NEG      Blood Negative NEG      Urobilinogen 1.0 0.2 - 1.0 EU/dL    Nitrites Negative NEG      Leukocyte Esterase TRACE (A) NEG      UA:UC IF INDICATED CULTURE NOT INDICATED BY UA RESULT      WBC 0-4 0 - 4 /hpf    RBC 0-5 0 - 5 /hpf    Epithelial cells MANY (A) FEW /lpf    Bacteria 1+ (A) NEG /hpf    Hyaline cast 0-2 0 - 2 /lpf        EKG: If performed, independent interpretation documented below in the MDM section     RADIOLOGY:  Non-plain film images such as CT, Ultrasound and MRI are read by the radiologist. Plain radiographic images are visualized and preliminarily interpreted by the ED Provider with the findings documented in the MDM section. Interpretation per the Radiologist below, if available at the time of this note:     No results found.       PROCEDURES   Unless otherwise noted below, none  Procedures     CRITICAL CARE TIME   ***    EMERGENCY DEPARTMENT COURSE and DIFFERENTIAL DIAGNOSIS/MDM   Vitals: Vitals:    04/01/23 1834   BP: 118/81   Pulse: 70   Resp: 18   Temp: 97.5 °F (36.4 °C)   SpO2: 95%   Weight: 118 kg (260 lb 2.3 oz)   Height: 5' (1.524 m)        Patient was given the following medications:  Medications   sodium chloride 0.9 % bolus infusion 1,000 mL (0 mL IntraVENous IV Completed 4/1/23 2248)   ondansetron (ZOFRAN) injection 4 mg (4 mg IntraVENous Given 4/1/23 1959)   ketorolac (TORADOL) injection 15 mg (15 mg IntraVENous Given 4/1/23 2001)   oxyCODONE-acetaminophen (PERCOCET) 5-325 mg per tablet 1 Tablet (1 Tablet Oral Given 4/1/23 2049)   oxyCODONE-acetaminophen (PERCOCET) 5-325 mg per tablet 1 Tablet (1 Tablet Oral Given 4/1/23 2224)       Medical Decision Making  Amount and/or Complexity of Data Reviewed  Labs: ordered. Risk  Prescription drug management. FINAL IMPRESSION     1. Seizure disorder ([facility] [address]    2. Chronic intractable headache, unspecified headache type          DISPOSITION/PLAN   Kimmy Franklin  results have been reviewed with her. She has been counseled regarding her diagnosis, treatment, and plan. She verbally conveys understanding and agreement of the signs, symptoms, diagnosis, treatment and prognosis and additionally agrees to follow up as discussed. She also agrees with the care-plan and conveys that all of her questions have been answered. I have also provided discharge instructions for her that include: educational information regarding their diagnosis and treatment, and list of reasons why they would want to return to the ED prior to their follow-up appointment, should her condition change.      CLINICAL IMPRESSION    {Disposition:24355}     PATIENT REFERRED TO:  Follow-up Information       Follow up With Specialties Details Why Contact Info    Robi Weber NP Nurse Practitioner, Neurology Schedule an appointment as soon as possible for a visit   [address] [place] [address]  [phone]                DISCHARGE MEDICATIONS:  Discharge Medication List as of 4/1/2023 10:34 PM            DISCONTINUED MEDICATIONS:  Discharge Medication List as of 4/1/2023 10:34 PM          I am the Primary Clinician of Record. Bruna Quiroz MD (electronically signed)    (Please note that parts of this dictation were completed with voice recognition software. Quite often unanticipated grammatical, syntax, homophones, and other interpretive errors are inadvertently transcribed by the computer software. Please disregards these errors.  Please excuse any errors that have escaped final proofreading.) headache type          DISPOSITION/PLAN   Roman Rodríguez  results have been reviewed with her. She has been counseled regarding her diagnosis, treatment, and plan. She verbally conveys understanding and agreement of the signs, symptoms, diagnosis, treatment and prognosis and additionally agrees to follow up as discussed. She also agrees with the care-plan and conveys that all of her questions have been answered. I have also provided discharge instructions for her that include: educational information regarding their diagnosis and treatment, and list of reasons why they would want to return to the ED prior to their follow-up appointment, should her condition change. CLINICAL IMPRESSION    Discharge Note: The patient is stable for discharge home. The signs, symptoms, diagnosis, and discharge instructions have been discussed, understanding conveyed, and agreed upon. The patient is to follow up as recommended or return to ER should their symptoms worsen. PATIENT REFERRED TO:  Follow-up Information       Follow up With Specialties Details Why Contact Info    Mich Sheikh NP Nurse Practitioner, Neurology Schedule an appointment as soon as possible for a visit   [address] [place] [address]  [phone]                DISCHARGE MEDICATIONS:  Discharge Medication List as of 4/1/2023 10:34 PM            DISCONTINUED MEDICATIONS:  Discharge Medication List as of 4/1/2023 10:34 PM          I am the Primary Clinician of Record. Ryan Aguilar MD (electronically signed)    (Please note that parts of this dictation were completed with voice recognition software. Quite often unanticipated grammatical, syntax, homophones, and other interpretive errors are inadvertently transcribed by the computer software. Please disregards these errors.  Please excuse any errors that have escaped final proofreading.)

## 2023-04-05 ENCOUNTER — OFFICE VISIT (OUTPATIENT)
Dept: NEUROLOGY | Age: 48
End: 2023-04-05
Payer: COMMERCIAL

## 2023-04-05 PROCEDURE — 64615 CHEMODENERV MUSC MIGRAINE: CPT | Performed by: NURSE PRACTITIONER

## 2023-04-05 NOTE — PROGRESS NOTES
[facility]  [address] [facility] [address] [place] [address]  Angel Mora  Tel: [phone]  Fax: [phone]      Date:  23     Name:  Yariel Jones  :  1975  MRN:  [id number]     PCP:  Catherine Jimenez MD    Chief Complaint   Patient presents with    Procedure     Botox       HISTORY OF PRESENT ILLNESS:  Patient presents today for Botox injections. Unfortunately patient suffered from a concussion last week which has increased her headaches, prior to her concussion patient notes the Botox that was administered 2023 appears to be really helping, she was having less frequent migraine headaches, patient is eager to proceed with the Botox procedure today to try to get him back on track. Patient also notes she had a seizure last Saturday while eating lunch with her boyfriend, she notes normally they are nocturnal seizures, she does not do a lot of convulsing but she will wake up and she is lost her bladder and sometimes her bowel control. Patient is unaware of how long these events last.  With a seizure that occurred last Saturday she does note that she had missed her morning medications which is the Primeloop for 1 to 2 days, she wonders if that is what provoked it. She states even with taking medications as prescribed sometimes she will have 1-2 of the seizure-like episodes a month. Patient that she can feel herself going through something but she does not shake. She has bit her tongue before. REVIEW OF SYSTEMS:     Review of Systems   Neurological:  Positive for seizures and headaches.        Current Outpatient Medications   Medication Sig    methylPREDNISolone (MEDROL DOSEPACK) 4 mg tablet Take as directed    Aimovig Autoinjector 140 mg/mL injection ADMINISTER 1 ML UNDER THE SKIN EVERY 30 DAYS    rimegepant (NURTEC) 75 mg disintegrating tablet Take 1 tab every other day , then 1 tab prn for severe headache, not exceed 1 dose in 24 hrs    busPIRone (BUSPAR) 15 mg tablet Take 1 Tablet by mouth three (3) times daily. zonisamide (ZONEGRAN) 100 mg capsule TAKE 2 CAPSULES BY MOUTH TWICE DAILY    acetaZOLAMIDE SR (DIAMOX) 500 mg capsule Take 1 Capsule by mouth two (2) times a day. tiZANidine (ZANAFLEX) 4 mg tablet Take one in the day and two at night    traZODone (DESYREL) 50 mg tablet Take 1 Tablet by mouth nightly. onabotulinumtoxinA (Botox) 200 unit injection 155 units by IM to 31 approved sites into head and neck every 12 weeks for chronic migraine. Indications: migraine prevention    QUEtiapine (SEROquel) 50 mg tablet TAKE 1 TABLET BY MOUTH EVERY NIGHT    QUEtiapine (SEROquel) 100 mg tablet TAKE 1 TABLET BY MOUTH EVERY DAY AT BEDTIME    traZODone (DESYREL) 100 mg tablet TAKE 1 TABLET BY MOUTH EVERY NIGHT    metFORMIN (GLUCOPHAGE) 1,000 mg tablet TAKE 1 TABLET BY MOUTH TWICE DAILY WITH THE MORNING AND EVENING MEAL    pregabalin (LYRICA) 200 mg capsule TAKE 1 CAPSULE BY MOUTH TWICE DAILY. MAX DAILY AMOUNT: 400 MG    Orilissa 200 mg tab Take 1 Tablet by mouth two (2) times a day. EPINEPHrine (EPIPEN) 0.3 mg/0.3 mL injection ADMINISTER 0.3 ML IN THE MUSCLE 1 TIME AS NEEDED FOR ANAPHYLAXIS    rosuvastatin (CRESTOR) 20 mg tablet Take 20 mg by mouth daily. tamsulosin (FLOMAX) 0.4 mg capsule TAKE 1 CAPSULE BY MOUTH EVERY DAY 30 MINUTES AFTER THE SAME MEAL    eletriptan (RELPAX) 40 mg tablet Take 1 Tablet by mouth daily as needed (migraine. May repeat in 40 minutes no more than 2 in 24 hours). clonazePAM (KlonoPIN) 1 mg tablet Take 1 Tablet by mouth nightly. Max Daily Amount: 1 mg.    butalbital-acetaminophen-caff (Fioricet) -40 mg per capsule Take 1 Capsule by mouth every four (4) hours as needed for Headache.    acyclovir (ZOVIRAX) 400 mg tablet Take 400 mg by mouth as needed.     diclofenac EC (VOLTAREN) 75 mg EC tablet diclofenac sodium 75 mg tablet,delayed release   TK 1 T PO BID    etodolac (LODINE) 400 mg tablet etodolac 400 mg tablet   TAKE 1 TABLET BY MOUTH TWICE DAILY    magnesium oxide 250 mg magnesium tablet magnesium 250 mg (as magnesium oxide) tablet   TK 1 T PO BID    pantoprazole (PROTONIX) 40 mg tablet Take 40 mg by mouth daily. prochlorperazine (COMPAZINE) 10 mg tablet     promethazine (PHENERGAN) 25 mg tablet Take 25 mg by mouth every eight (8) hours as needed. metaxalone (SKELAXIN) 800 mg tablet Take 1 Tablet by mouth three (3) times daily. atenoloL (TENORMIN) 50 mg tablet Take 1 Tablet by mouth two (2) times a day. atorvastatin (LIPITOR) 20 mg tablet Take 1 Tablet by mouth daily. cyanocobalamin (VITAMIN B12) 1,000 mcg/mL injection 1,000 mcg by IntraMUSCular route every Sunday. albuterol (PROVENTIL HFA, VENTOLIN HFA, PROAIR HFA) 90 mcg/actuation inhaler Take 1 Puff by inhalation every six (6) hours as needed for Wheezing. therapeutic multivitamin (THERAGRAN) tablet Take 1 Tablet by mouth daily. ascorbic acid, vitamin C, (VITAMIN C) 500 mg tablet Take 500 mg by mouth daily. cholecalciferol (VITAMIN D3) (1000 Units /25 mcg) tablet Take 1,000 Units by mouth daily. ondansetron hcl (ZOFRAN) 4 mg tablet TAKE 1 TABLET BY MOUTH DAILY AS NEEDED FOR NAUSEA OR VOMITING    omeprazole (PRILOSEC) 20 mg capsule Take 20 mg by mouth daily. fluticasone propion-salmeteroL (ADVAIR/WIXELA) 250-50 mcg/dose diskus inhaler Take 1 Puff by inhalation every twelve (12) hours. diphenhydrAMINE (BENADRYL) 25 mg capsule Take 25 mg by mouth as needed. Indications: Allerigc to [facility] - only takes with evette     No current facility-administered medications for this visit.      Allergies   Allergen Reactions    Latex Other (comments)    Fentanyl Shortness of Breath     Other reaction(s): wheezing/sob  Other reaction(s): wheezing/sob  Other reaction(s): wheezing/sob      Lemon Anaphylaxis     Other reaction(s): anaphylaxis/angioedema    Adhesive Tape-Silicones Other (comments)     Pt reports it burns her skin    Dilaudid [Hydromorphone (Pf)] Itching    Percocet [Oxycodone-Acetaminophen] Nausea and Vomiting     Pt states not allergic!      Past Medical History:   Diagnosis Date    Asthma     Concussion 03/2023    Depression     Diabetes (HCC)     Fibromyalgia     GERD (gastroesophageal reflux disease)     Hypercholesterolemia     Hypertension     Lower back injury 02/13/2017    Migraines     Nausea and vomiting 01/14/2014    Other ill-defined conditions(799.89)     cerebral tumor, endometriosis, fibromyalgia, herpes    Right lower quadrant abdominal mass 01/14/2014    Seizures (HCC)     Sleep apnea     intolerant to CPAP    Unspecified adverse effect of anesthesia     pt states that she has an asthma attack when coming out of  anesthesia     Past Surgical History:   Procedure Laterality Date    HX CHOLECYSTECTOMY      HX HEENT      wisdom teeth    HX OTHER SURGICAL      laproscopy on ovaries    HX OTHER SURGICAL      left wrist ganglion cystectomy    HX OTHER SURGICAL      lung mass removed    HX TOTAL COLECTOMY  3/14/2013    Rt colectomy for perf transverse diverticulum     Social History     Socioeconomic History    Marital status: LEGALLY      Spouse name: Not on file    Number of children: Not on file    Years of education: Not on file    Highest education level: Not on file   Occupational History    Not on file   Tobacco Use    Smoking status: Never    Smokeless tobacco: Never    Tobacco comments:     vapor   Vaping Use    Vaping Use: Never used   Substance and Sexual Activity    Alcohol use: Not Currently     Comment: ocasionally    Drug use: Not on file     Comment: occ    Sexual activity: Not on file   Other Topics Concern    Not on file   Social History Narrative    Not on file     Social Determinants of Health     Financial Resource Strain: Not on file   Food Insecurity: Not on file   Transportation Needs: Not on file   Physical Activity: Not on file   Stress: Not on file   Social Connections: Not on file   Intimate Partner Violence: Not on file   Housing Stability: Not on file     Family History   Problem Relation Age of Onset    Other Other         unable to obtain due to AMS    Hypertension Father     Heart Disease Father     Cancer Father     Elevated Lipids Father     Hypertension Mother     Diabetes Maternal Grandmother     Diabetes Maternal Grandfather     Lupus Sister     Hypertension Sister     Elevated Lipids Sister          PHYSICAL EXAMINATION:    There were no vitals taken for this visit. General:  Well defined, nourished, and well groomed individual in no acute distress. Neck: Supple, nontender, normal range of motion. Musculoskeletal:  Extremities revealed no edema and had full range of motion of joints. Psych:  Good mood and bright affect    NEUROLOGICAL EXAMINATION:     Mental Status:   Alert and oriented to person, place, and time with recent and remote memory intact. Attention span and concentration are normal. Clear speech. Fund of knowledge preserved. Cranial Nerves: Grossly intact. Gait and Station:  Steady gait. Normal arm swing. Reflexes:  DTRs 2+ in bilateral biceps, brachioradialis, patella and ankle. No clonus noted. ASSESSMENT AND PLAN      ICD-10-CM ICD-9-CM    1. Intractable chronic migraine without aura and with status migrainosus  G43.711 346.73 CHEMODERVATE FACIAL/TRIGEM/CERV MUSC MIGRAINE      2. S/P Botox injection  Z92.29 V87.49 CHEMODERVATE FACIAL/TRIGEM/CERV MUSC MIGRAINE      3. Partial symptomatic epilepsy with complex partial seizures, not intractable, without status epilepticus (HCC)  G40.209 345.40 NEURO EEG 24 HR        1. Intractable chronic migraine without aura and with status migrainosus: See procedure note for Botox injection, consent obtained, patient is continue all medications as prescribed. -     CHEMODERVATE FACIAL/TRIGEM/CERV MUSC MIGRAINE  2.  S/P Botox injection: See procedure note for Botox injections, patient is scheduled for 12-week follow-up for repeat injection.  -     CHEMODERVATE FACIAL/TRIGEM/CERV MUSC MIGRAINE  3. Partial symptomatic epilepsy with complex partial seizures, not intractable, without status epilepticus [facility]): It has been approximately 2 years since last EEG was completed therefore I will order 24-hour EEG, modify plan of care based on results. Discussed the importance of maintaining medication compliance with the Zonegran 100 mg capsule, she is take 2 capsules twice a day, will defer to Dr. Jory Ambriz  at upcoming appointment to discuss modifications to her treatment plan if needed. Patient is aware she is not to be driving for 6 months after each seizure episode.  -     NEURO EEG 24 HR; Future      Patient and/or family verbalized understand of all instructions and all questions/concerns were addressed. Safety/side effects of medications discussed. Patient remains a complex patient secondary to polypharmacy, significant comorbid conditions, and use of high-risk medications which complicate the decision making process related to patient's neurologic diagnosis. We will see the patient back in   1 months, sooner if needed.       EDWINA Hugo-BC

## 2023-04-05 NOTE — PROCEDURES
Botox Injection Note       Indication: patient has chronic recurrent migraine. Procedure:   Botox concentration: 200 units in 4 ml of preservative-free normal saline. aMme Matos 47: [address]-05  Lot number: [id number]  Expiration date:  09/2025      31 sites injections, distribution as follow      Units/site  Sites Sides Subtotal    Procerus 5 1 1 5    5 1 2 10   Frontalis 5 2 2 20   Temporalis 5 4 2 40   Occipitalis 5 3 2 30   Upper cervical paraspinalis 5 2 2 20   Trapezius 5 3 2 30         200 units Botox were reconstituted, 155 units injected as above and the remainder was unavoidably wasted.      Patient tolerated procedure well.       _____________________________   Mattie Caballero NP

## 2023-05-05 ENCOUNTER — TELEPHONE (OUTPATIENT)
Dept: NEUROLOGY | Age: 48
End: 2023-05-05

## 2023-05-16 ENCOUNTER — HOSPITAL ENCOUNTER (OUTPATIENT)
Facility: HOSPITAL | Age: 48
Discharge: HOME | End: 2023-05-19
Payer: COMMERCIAL

## 2023-05-16 DIAGNOSIS — G40.209 PARTIAL SYMPTOMATIC EPILEPSY WITH COMPLEX PARTIAL SEIZURES, NOT INTRACTABLE, WITHOUT STATUS EPILEPTICUS (HCC): ICD-10-CM

## 2023-05-16 PROCEDURE — 95819 EEG AWAKE AND ASLEEP: CPT

## 2023-05-19 DIAGNOSIS — G40.209 LOCALIZATION-RELATED (FOCAL) (PARTIAL) SYMPTOMATIC EPILEPSY AND EPILEPTIC SYNDROMES WITH COMPLEX PARTIAL SEIZURES, NOT INTRACTABLE, WITHOUT STATUS EPILEPTICUS (HCC): Primary | ICD-10-CM

## 2023-05-20 NOTE — PROCEDURES
Name: Constantin Coats  : 1975  Age: 50 y.o. Admit Date: (Not on file)  MRN: [id number]  Date of EE2023        ELECTROENCEPHALOGRAM REPORT    PROCEDURE: 24 HR Continuous telemetry EEG monitoring     EEG PROCEDURE NUMBER: [id number]   TECHNICAL NOTES:  This recording was performed on equipment with 32 channel capability using scalp electrodes. EEG specifications in accordance with ASCN guidelines. Additional EKG monitoring was utilized. Scalp electrodes were placed in accordance with the International 10-20 system. Additional inferior temporal electrodes were placed at F9, F10, T9, T10, P9, and P10. DESCRIPTION OF THE RECORDING: Continuous telemetry EEG monitoring was requested in this 50 y.o. female   to appropriately capture and characterize the episodes of clinical interest for accurate diagnosis to rule out seizures. RECORDING START TIME: May 16, 2023 at 9:30 AM    RECORDING END TIME: May 17, 2023 at 10 AM    PUSH BUTTON EVENTS: none    FINDINGS: This is an essentially normal amatory 24-hour EEG recording for 24-1/2 hours showing no clear seizure activity, no clear epileptiform activity, no focal slowing, most likely spike and wave discharges and no recorded dysrhythmic or electrographic spells of any type. Clinical correlation recommended. BACKGROUND: The background of this recording contains a posteriorly-located occipital alpha rhythm of 9-10 hz that attenuates with eye opening. ACTIVATING PROCEDURES: YES  There was no hyperventilation or photic stimulation     SLEEP: Normal sleep architecture is seen including slow-wave sleep. Representative sections as noted by the technologist for consideration of   review are reviewed.        INTERPRETATION: This 24hr video EEG done to rule out seizures failed to demonstrate any clinical events suggesting seizures, and no dysrhythmic or electrographic spells were recorded, and no spike or spike and wave discharges were seen and no

## 2023-06-05 ENCOUNTER — OFFICE VISIT (OUTPATIENT)
Age: 48
End: 2023-06-05
Payer: COMMERCIAL

## 2023-06-05 VITALS
HEART RATE: 90 BPM | OXYGEN SATURATION: 96 % | HEIGHT: 60 IN | RESPIRATION RATE: 16 BRPM | SYSTOLIC BLOOD PRESSURE: 140 MMHG | TEMPERATURE: 97.8 F | BODY MASS INDEX: 51.44 KG/M2 | WEIGHT: 262 LBS | DIASTOLIC BLOOD PRESSURE: 86 MMHG

## 2023-06-05 DIAGNOSIS — E11.9 TYPE 2 DIABETES MELLITUS WITHOUT COMPLICATION, WITHOUT LONG-TERM CURRENT USE OF INSULIN (HCC): ICD-10-CM

## 2023-06-05 DIAGNOSIS — G43.711 CHRONIC MIGRAINE WITHOUT AURA, INTRACTABLE, WITH STATUS MIGRAINOSUS: ICD-10-CM

## 2023-06-05 DIAGNOSIS — G47.33 OBSTRUCTIVE SLEEP APNEA SYNDROME: ICD-10-CM

## 2023-06-05 DIAGNOSIS — F07.81 POSTCONCUSSION SYNDROME: ICD-10-CM

## 2023-06-05 DIAGNOSIS — G93.2 PSEUDOTUMOR CEREBRI: ICD-10-CM

## 2023-06-05 DIAGNOSIS — E11.9 TYPE 2 DIABETES MELLITUS WITHOUT COMPLICATION, WITHOUT LONG-TERM CURRENT USE OF INSULIN (HCC): Primary | ICD-10-CM

## 2023-06-05 DIAGNOSIS — M79.7 FIBROMYALGIA: ICD-10-CM

## 2023-06-05 PROBLEM — N17.9 AKI (ACUTE KIDNEY INJURY) (HCC): Status: RESOLVED | Noted: 2021-10-01 | Resolved: 2023-06-05

## 2023-06-05 PROCEDURE — 3077F SYST BP >= 140 MM HG: CPT | Performed by: PSYCHIATRY & NEUROLOGY

## 2023-06-05 PROCEDURE — 2022F DILAT RTA XM EVC RTNOPTHY: CPT | Performed by: PSYCHIATRY & NEUROLOGY

## 2023-06-05 PROCEDURE — G8427 DOCREV CUR MEDS BY ELIG CLIN: HCPCS | Performed by: PSYCHIATRY & NEUROLOGY

## 2023-06-05 PROCEDURE — 96372 THER/PROPH/DIAG INJ SC/IM: CPT | Performed by: PSYCHIATRY & NEUROLOGY

## 2023-06-05 PROCEDURE — 3079F DIAST BP 80-89 MM HG: CPT | Performed by: PSYCHIATRY & NEUROLOGY

## 2023-06-05 PROCEDURE — 3046F HEMOGLOBIN A1C LEVEL >9.0%: CPT | Performed by: PSYCHIATRY & NEUROLOGY

## 2023-06-05 PROCEDURE — 99215 OFFICE O/P EST HI 40 MIN: CPT | Performed by: PSYCHIATRY & NEUROLOGY

## 2023-06-05 PROCEDURE — 1036F TOBACCO NON-USER: CPT | Performed by: PSYCHIATRY & NEUROLOGY

## 2023-06-05 PROCEDURE — G8417 CALC BMI ABV UP PARAM F/U: HCPCS | Performed by: PSYCHIATRY & NEUROLOGY

## 2023-06-05 RX ORDER — PREGABALIN 100 MG/1
100 CAPSULE ORAL 2 TIMES DAILY
Qty: 180 CAPSULE | Refills: 1 | Status: SHIPPED | OUTPATIENT
Start: 2023-06-05 | End: 2023-12-02

## 2023-06-05 RX ORDER — KETOROLAC TROMETHAMINE 30 MG/ML
60 INJECTION, SOLUTION INTRAMUSCULAR; INTRAVENOUS ONCE
Qty: 2 ML | Refills: 0
Start: 2023-06-05 | End: 2023-06-05 | Stop reason: CLARIF

## 2023-06-05 RX ORDER — KETOROLAC TROMETHAMINE 30 MG/ML
60 INJECTION, SOLUTION INTRAMUSCULAR; INTRAVENOUS
Status: COMPLETED | OUTPATIENT
Start: 2023-06-05 | End: 2023-06-05

## 2023-06-05 RX ADMIN — KETOROLAC TROMETHAMINE 60 MG: 30 INJECTION, SOLUTION INTRAMUSCULAR; INTRAVENOUS at 14:55

## 2023-06-05 ASSESSMENT — PATIENT HEALTH QUESTIONNAIRE - PHQ9
SUM OF ALL RESPONSES TO PHQ QUESTIONS 1-9: 2
SUM OF ALL RESPONSES TO PHQ QUESTIONS 1-9: 2
1. LITTLE INTEREST OR PLEASURE IN DOING THINGS: 1
SUM OF ALL RESPONSES TO PHQ QUESTIONS 1-9: 2
SUM OF ALL RESPONSES TO PHQ QUESTIONS 1-9: 2
SUM OF ALL RESPONSES TO PHQ9 QUESTIONS 1 & 2: 2
2. FEELING DOWN, DEPRESSED OR HOPELESS: 1

## 2023-06-05 NOTE — ASSESSMENT & PLAN NOTE
A1c has been ordered she is to follow-up with primary care for further management and medication if appropriate

## 2023-06-05 NOTE — ASSESSMENT & PLAN NOTE
Patient is on significant polypharmacy I will give her Toradol 60 mg IM today in the office  I have encouraged her to stop using rescue medication on a daily basis  Lyrica has been restarted for fibromyalgia but hopefully will give her some benefit in this domain  She is to continue with Botox as well as Aimovig 140mg/month  She is due for Botox at the end of June so hopefully her migraines will stabilize

## 2023-06-05 NOTE — ASSESSMENT & PLAN NOTE
Probably the trigger for her daytime somnolence  Patient cannot tolerate CPAP  She is morbidly obese weight loss would be a considerable benefit to managing this diagnosis will defer to primary care going forward

## 2023-06-05 NOTE — ASSESSMENT & PLAN NOTE
Patient states headaches have been worse since that time and is also having more problems with cognitive difficulty  I did not however find anything significantly problematic on general interview with the patient regarding cognition  We have want to consider neuropsych testing  Can consider going to the concussion clinic    Focus of today will be related to her severe migraine and this can be addressed at upcoming visits   Additionally there are many reasons why the patient may be having cognitive difficulties to include polypharmacy, untreated sleep apnea, or metabolic factors as well as chronic pain

## 2023-06-05 NOTE — PATIENT INSTRUCTIONS
As per discussion  You have chronic headaches for multiple reasons to include a history of something called benign increased intracranial pressure for which you are on Diamox if your headaches do not improve we may need to consider doing another spinal tap. Also using too much rescue medication actually worsens the migraine syndrome so if you using rescue medicine more than 2 days in a 7-day cycle will aggravate your chronic migraines    You need to go under the care of primary care for your blood sugar issues    I have renewed your Lyrica but starting at 100 mg twice a day since you have been off of it for several months  And when you follow-up at your next medical office visit that can be adjusted if needed. Office Policies    Phone calls/patient messages:  Please allow up to 24 hours for someone in the office to contact you about your call or message. Be mindful your provider may be out of the office or your message may require further review. We encourage you to use [employer] for your messages as this is a faster, more efficient way to communicate with our office    Medication Refills:  Prescription medications require up to 48 business hours to process. We encourage you to use [employer] for your refills. For controlled medications: Please allow up to 72 business hours to process. Certain medications may require you to  a written prescription at our office. NO narcotic/controlled medications will be prescribed after 4pm Monday through Friday or on weekends    Form/Paperwork Completion:  We ask that you allow 7-14 business days. You may also download your forms to [employer] to have your doctor print off.

## 2023-06-05 NOTE — ASSESSMENT & PLAN NOTE
Remains on Diamox 500 mg twice daily if headaches persist without stabilization and she is not in rebound headache we may need to consider repeat LP this has been discussed at today's office visit

## 2023-06-05 NOTE — PROGRESS NOTES
Chief Complaint   Patient presents with    Follow-up    Migraine     1. Have you been to the ER, urgent care clinic since your last visit? Yes Hospitalized since your last visit? No     2. Have you seen or consulted any other health care providers outside of the [address] [place] [address] since your last visit? Yes  Include any pap smears or colon screening. Patient C/O headaches and migraines  10/10 today here for review EEG . Pain with fibromyalgia .
spine.    CTA HEAD  Diminutive vertebral arteries. Left vertebral artery slightly larger than the  right vertebral artery. A1 segments are present bilaterally. Annelle Castles Annelle Castles A 2 segments are patent. Symmetric arborization of M2 vessels is demonstrated. The basilar artery is patent. . The M1 segments are patent bilaterally. .The  posterior cerebral arteries on the right and on the left are patent. .  There is  no aneurysm. There are no sizable posterior communicating arteries. No evidence of acute intracranial hemorrhage or midline shift is demonstrated. Very small hypodense foci in the basal ganglia on the right and on the left and  corresponding imaging findings demonstrated on brain . Stable. Impression  There is no evidence of aneurysm, dissection or hemodynamically significant  stenosis. .    There is no intracranial mass, hemorrhage or evidence of acute infarction. No acute intracranial process is identified. .            Allergies   Allergen Reactions    Latex Other (See Comments)    Fentanyl Shortness Of Breath     Other reaction(s): wheezing/sob  Other reaction(s): wheezing/sob  Other reaction(s): wheezing/sob    Lemon Oil Anaphylaxis     Other reaction(s): anaphylaxis/angioedema    Adhesive Tape Other (See Comments)     Pt reports it burns her skin    Hydromorphone Itching    Oxycodone-Acetaminophen Nausea And Vomiting     Pt states not allergic! Current Outpatient Medications   Medication Sig    Multiple Vitamins-Minerals (MULTIPLE VITAMINS/WOMENS PO) Take by mouth    Prenatal Vit-Fe Fumarate-FA (PRENATAL VITAMIN PO) Take by mouth    pregabalin (LYRICA) 100 MG capsule Take 1 capsule by mouth in the morning and at bedtime for 180 days.  Max Daily Amount: 200 mg    acetaZOLAMIDE (DIAMOX) 500 MG extended release capsule Take 1 capsule by mouth 2 times daily    acyclovir (ZOVIRAX) 400 MG tablet Take 1 tablet by mouth as needed    albuterol sulfate HFA (PROVENTIL;VENTOLIN;PROAIR) 108 (90 Base)

## 2023-06-06 LAB
EST. AVERAGE GLUCOSE BLD GHB EST-MCNC: 140 MG/DL
HBA1C MFR BLD: 6.5 % (ref 4–5.6)

## 2023-06-15 ENCOUNTER — TELEPHONE (OUTPATIENT)
Age: 48
End: 2023-06-15

## 2023-06-19 NOTE — TELEPHONE ENCOUNTER
Spoke with Key with [address] [facility] @ [phone] confirmed Botox delivery for 6/21/23 Order # [id number]

## 2023-06-21 ENCOUNTER — CLINICAL DOCUMENTATION (OUTPATIENT)
Age: 48
End: 2023-06-21

## 2023-06-21 NOTE — PROGRESS NOTES
Botox came in the office: 6/21/23  MFR: Sandy Larson  LOT: [id number]  Exp: 02/2026  Appointment: 6/27/2023  Provider: Silvio Lindsey   Specialty pharmacy: [facility] Phone Number: [phone]

## 2023-06-27 ENCOUNTER — PROCEDURE VISIT (OUTPATIENT)
Age: 48
End: 2023-06-27
Payer: COMMERCIAL

## 2023-06-27 DIAGNOSIS — G43.709 CHRONIC MIGRAINE W/O AURA W/O STATUS MIGRAINOSUS, NOT INTRACTABLE: Primary | ICD-10-CM

## 2023-06-27 PROCEDURE — 64615 CHEMODENERV MUSC MIGRAINE: CPT | Performed by: INTERNAL MEDICINE

## 2023-06-27 ASSESSMENT — PATIENT HEALTH QUESTIONNAIRE - PHQ9
SUM OF ALL RESPONSES TO PHQ QUESTIONS 1-9: 0
2. FEELING DOWN, DEPRESSED OR HOPELESS: 0
1. LITTLE INTEREST OR PLEASURE IN DOING THINGS: 0
SUM OF ALL RESPONSES TO PHQ QUESTIONS 1-9: 0
SUM OF ALL RESPONSES TO PHQ9 QUESTIONS 1 & 2: 0

## 2023-07-02 ENCOUNTER — APPOINTMENT (OUTPATIENT)
Facility: HOSPITAL | Age: 48
End: 2023-07-02
Payer: COMMERCIAL

## 2023-07-02 ENCOUNTER — HOSPITAL ENCOUNTER (EMERGENCY)
Facility: HOSPITAL | Age: 48
Discharge: HOME | End: 2023-07-02
Attending: STUDENT IN AN ORGANIZED HEALTH CARE EDUCATION/TRAINING PROGRAM
Payer: COMMERCIAL

## 2023-07-02 VITALS
TEMPERATURE: 98.1 F | SYSTOLIC BLOOD PRESSURE: 153 MMHG | HEART RATE: 93 BPM | BODY MASS INDEX: 49.16 KG/M2 | DIASTOLIC BLOOD PRESSURE: 104 MMHG | WEIGHT: 260.36 LBS | HEIGHT: 61 IN | RESPIRATION RATE: 16 BRPM | OXYGEN SATURATION: 99 %

## 2023-07-02 DIAGNOSIS — M79.89 LEG SWELLING: Primary | ICD-10-CM

## 2023-07-02 LAB — ECHO BSA: 2.25 M2

## 2023-07-02 PROCEDURE — 99284 EMERGENCY DEPT VISIT MOD MDM: CPT

## 2023-07-02 PROCEDURE — 93971 EXTREMITY STUDY: CPT

## 2023-07-02 PROCEDURE — 6370000000 HC RX 637 (ALT 250 FOR IP): Performed by: STUDENT IN AN ORGANIZED HEALTH CARE EDUCATION/TRAINING PROGRAM

## 2023-07-02 RX ORDER — OXYCODONE HYDROCHLORIDE 5 MG/1
5 TABLET ORAL EVERY 6 HOURS PRN
Qty: 8 TABLET | Refills: 0 | Status: SHIPPED | OUTPATIENT
Start: 2023-07-02 | End: 2023-07-05

## 2023-07-02 RX ORDER — OXYCODONE HYDROCHLORIDE 5 MG/1
5 TABLET ORAL
Status: COMPLETED | OUTPATIENT
Start: 2023-07-02 | End: 2023-07-02

## 2023-07-02 RX ADMIN — OXYCODONE HYDROCHLORIDE 5 MG: 5 TABLET ORAL at 20:03

## 2023-07-02 ASSESSMENT — PAIN SCALES - GENERAL
PAINLEVEL_OUTOF10: 9
PAINLEVEL_OUTOF10: 7

## 2023-07-03 NOTE — DISCHARGE INSTRUCTIONS
If your symptoms change or worsen, return to the ER as soon as possible. Please ice, elevate and try to rest your leg. Follow up with orthopedics if your symptoms do not improve.

## 2023-07-03 NOTE — ED PROVIDER NOTES
[facility] EMERGENCY DEPT  EMERGENCY DEPARTMENT ENCOUNTER       Pt Name: Julieta Bumpers  MRN: [id number]  [address] [facility] [place] 1975  Date of evaluation: 7/2/2023  Provider: Violet Murillo DO   PCP: Melisa Rai MD  Note Started: 10:43 PM 7/2/23     CHIEF COMPLAINT       Chief Complaint   Patient presents with    Leg Swelling     Pt had a fall down concrete steps onto concrete three weeks ago. Has since been seen at ER and doctor office, but the right lower leg bruising , swelling and pain have not gone down. Pt not on a blood thinner. HISTORY OF PRESENT ILLNESS: 1 or more elements      History From: Patient  None     Julieta Bumpers is a 50 y.o. female who presents with cc of fall down concrete steps 3 weeks ago. She has been evaluated by PCP and doctor's office but returned due to continued right lower leg bruising, swelling pain. She denies any blood thinners. She denies any chest pain or shortness of breath. She denies fever or hx of DVT. Nursing Notes were all reviewed and agreed with or any disagreements were addressed in the HPI. REVIEW OF SYSTEMS      Review of Systems     Positives and Pertinent negatives as per HPI.     PAST HISTORY     Past Medical History:  Past Medical History:   Diagnosis Date    Acute renal injury ([address]) 7/3/2011    JOSE DANIEL (acute kidney injury) ([address]) 10/1/2021    Asthma     Concussion 03/2023    Depression     Diabetes ([address])     Fibromyalgia     GERD (gastroesophageal reflux disease)     Hypercholesterolemia     Hypertension     Lower back injury 02/13/2017    Migraines     Nausea and vomiting 01/14/2014    Other ill-defined conditions(799.89)     cerebral tumor, endometriosis, fibromyalgia, herpes    Right lower quadrant abdominal mass 01/14/2014    Seizures (HCC)     Sleep apnea     intolerant to CPAP    Unspecified adverse effect of anesthesia     pt states that she has an asthma attack when coming out of  anesthesia    UTI (urinary tract infection) 4/15/2012

## 2023-08-09 ENCOUNTER — TELEPHONE (OUTPATIENT)
Age: 48
End: 2023-08-09

## 2023-08-14 ENCOUNTER — TELEPHONE (OUTPATIENT)
Age: 48
End: 2023-08-14

## 2023-08-14 NOTE — TELEPHONE ENCOUNTER
Pt would like to discuss rescheduling appointments for both OCB and Botox if available. Also would like to know if she needs a  after OCB. Please call.

## 2023-08-14 NOTE — TELEPHONE ENCOUNTER
Verified patient with 2 identifiers   Patient requesting to have both the OCB and botox to be moved to a later time in afternoon. Advised that Dr Jana Reyez does not have any further appts available. Advised 8/31/23 is Dr Greta Frank last day in clinic. Patient states she will keep appt on 8/31/23 for the OCB and just get out of work early. As for her botox appt scheduled with Alison Santiago NP on 10/4/23 - advised the next time Floyd Garner would be able to see her in the afternoon would be 1/2024.  Patient also decided to keep the 10/4/23 appt at 9:30 am

## 2023-08-16 ENCOUNTER — TELEPHONE (OUTPATIENT)
Age: 48
End: 2023-08-16

## 2023-08-16 NOTE — TELEPHONE ENCOUNTER
Occipital Nerve Block -   CPT: 67589 -NO PA REQUIRED  CPT: [address] [facility] automated system [phone]/ Ref #: [id number]

## 2023-08-21 ENCOUNTER — TELEPHONE (OUTPATIENT)
Age: 48
End: 2023-08-21

## 2023-08-21 NOTE — TELEPHONE ENCOUNTER
Called Pt to advise her of this but was unable to LVM due to vm box being full. Will call again tomorrow.

## 2023-08-21 NOTE — TELEPHONE ENCOUNTER
Pt called wanting to know if her OCB appt on 8/31 could be pushed to 3 pm. Please advise.    [phone]

## 2023-08-21 NOTE — TELEPHONE ENCOUNTER
Please call patient back and advise Dr Jarret Garza is fully booked to her last day. We do not have any open slots to move her appointment.  Thanks

## 2023-08-23 NOTE — TELEPHONE ENCOUNTER
Advised Pt that Dr Elgin Jordan is completely booked out. Pt verbalized understanding and stated she will come in at her original time of 2:20 on 8/31.

## 2023-08-30 ENCOUNTER — TELEPHONE (OUTPATIENT)
Age: 48
End: 2023-08-30

## 2023-08-30 NOTE — TELEPHONE ENCOUNTER
Pt cancelled the occipital nerve block for 8-, dose want to R/S. Will ask nurse to call to R/S    Patient verbalized understanding.

## 2023-08-30 NOTE — TELEPHONE ENCOUNTER
Pt would michell to discuss ocb procedure and reschedule. Procedure currently scheduled for 8/31 , tomorrow.

## 2023-09-29 ENCOUNTER — TELEPHONE (OUTPATIENT)
Age: 48
End: 2023-09-29

## 2023-09-29 ENCOUNTER — CLINICAL DOCUMENTATION (OUTPATIENT)
Age: 48
End: 2023-09-29

## 2023-09-29 NOTE — PROGRESS NOTES
Spoke with Judith Whalen and Amelie Kim at [facility] to schedule botox delivery. Verbal order for Botox given to pharmacist Amelie Kim. Botox 200 Units - 155 units by IM to 31 approved sites into head and neck every 12 weeks for chronic migraine for migraine prevention. 3 refills. Amelie Kim read back correctly. She did state patient has a $150 co pay. States she did have a co pay card which may have . Tried to contact patient to let her know however no answer and VM is full - unable to leave a msg.

## 2023-10-03 ENCOUNTER — CLINICAL DOCUMENTATION (OUTPATIENT)
Age: 48
End: 2023-10-03

## 2023-10-03 NOTE — PROGRESS NOTES
Botox came in the office: 10/3/23  MFR: Kia Hall  LOT: [id number]  Exp: 02/2026  Appointment: 10/04/23  Provider: Marysol Voss NP   Specialty pharmacy: [facility]  Pharmacy Phone Number: [phone]

## 2023-10-04 ENCOUNTER — PROCEDURE VISIT (OUTPATIENT)
Age: 48
End: 2023-10-04
Payer: COMMERCIAL

## 2023-10-04 DIAGNOSIS — Z92.29 S/P BOTOX INJECTION: ICD-10-CM

## 2023-10-04 DIAGNOSIS — G43.709 CHRONIC MIGRAINE W/O AURA W/O STATUS MIGRAINOSUS, NOT INTRACTABLE: Primary | ICD-10-CM

## 2023-10-04 PROCEDURE — 64615 CHEMODENERV MUSC MIGRAINE: CPT | Performed by: NURSE PRACTITIONER

## 2023-10-04 NOTE — PROGRESS NOTES
Botox Injection Note       ICD-10-CM    1. Chronic migraine w/o aura w/o status migrainosus, not intractable  G43.709 Onabotulinumtoxin A (BOTOX) injection 200 Units      2. S/P Botox injection  Z92.29            Indication: patient has chronic recurrent migraine. Procedure:   Botox concentration: 200 units in 4 ml of preservative-free normal saline. 31 sites injections, distribution as follow      Units/site  Sites Sides Subtotal    Procerus 5 1 1 5    5 1 2 10   Frontalis 5 2 2 20   Temporalis 5 4 2 40   Occipitalis 5 3 2 30   Upper cervical paraspinalis 5 2 2 20   Trapezius 5 3 2 30         200 units Botox were reconstituted, 155 units injected as above and the remainder was unavoidably wasted. Patient tolerated procedure well. Last procedure 6/27/2023 with Dr Milagros Valente.   Meds:  Prior abortive tx:     Prior preventative tx:Depakote  Zonegran  Verapamil  Elavil  Pamelor  Botox injection  Cymbalta  Neurontin  Lyrica     Current abortive tx: Ubrelvy, Relpax,  Current preventative tx: Aimovig, Botox      _____________________________   SONIA Boswell - NP

## 2023-10-16 ENCOUNTER — APPOINTMENT (OUTPATIENT)
Facility: HOSPITAL | Age: 48
End: 2023-10-16
Payer: COMMERCIAL

## 2023-10-16 ENCOUNTER — HOSPITAL ENCOUNTER (EMERGENCY)
Facility: HOSPITAL | Age: 48
Discharge: HOME | End: 2023-10-16
Attending: STUDENT IN AN ORGANIZED HEALTH CARE EDUCATION/TRAINING PROGRAM
Payer: COMMERCIAL

## 2023-10-16 VITALS
BODY MASS INDEX: 47.03 KG/M2 | HEART RATE: 89 BPM | OXYGEN SATURATION: 98 % | TEMPERATURE: 97.5 F | RESPIRATION RATE: 14 BRPM | DIASTOLIC BLOOD PRESSURE: 85 MMHG | SYSTOLIC BLOOD PRESSURE: 141 MMHG | WEIGHT: 248.9 LBS

## 2023-10-16 DIAGNOSIS — S39.012A STRAIN OF LUMBAR REGION, INITIAL ENCOUNTER: ICD-10-CM

## 2023-10-16 DIAGNOSIS — K52.9 GASTROENTERITIS: Primary | ICD-10-CM

## 2023-10-16 LAB
ALBUMIN SERPL-MCNC: 3 G/DL (ref 3.5–5)
ALBUMIN/GLOB SERPL: 0.9 (ref 1.1–2.2)
ALP SERPL-CCNC: 108 U/L (ref 45–117)
ALT SERPL-CCNC: 25 U/L (ref 12–78)
ANION GAP SERPL CALC-SCNC: 2 MMOL/L (ref 5–15)
APPEARANCE UR: CLEAR
AST SERPL-CCNC: 20 U/L (ref 15–37)
BACTERIA URNS QL MICRO: NEGATIVE /HPF
BASOPHILS # BLD: 0 K/UL (ref 0–0.1)
BASOPHILS NFR BLD: 1 % (ref 0–1)
BILIRUB SERPL-MCNC: 0.2 MG/DL (ref 0.2–1)
BILIRUB UR QL: NEGATIVE
BUN SERPL-MCNC: 13 MG/DL (ref 6–20)
BUN/CREAT SERPL: 12 (ref 12–20)
CALCIUM SERPL-MCNC: 8.7 MG/DL (ref 8.5–10.1)
CHLORIDE SERPL-SCNC: 114 MMOL/L (ref 97–108)
CO2 SERPL-SCNC: 28 MMOL/L (ref 21–32)
COLOR UR: ABNORMAL
CREAT SERPL-MCNC: 1.06 MG/DL (ref 0.55–1.02)
DIFFERENTIAL METHOD BLD: ABNORMAL
EOSINOPHIL # BLD: 0.2 K/UL (ref 0–0.4)
EOSINOPHIL NFR BLD: 3 % (ref 0–7)
EPITH CASTS URNS QL MICRO: ABNORMAL /LPF
ERYTHROCYTE [DISTWIDTH] IN BLOOD BY AUTOMATED COUNT: 15.2 % (ref 11.5–14.5)
GLOBULIN SER CALC-MCNC: 3.4 G/DL (ref 2–4)
GLUCOSE SERPL-MCNC: 90 MG/DL (ref 65–100)
GLUCOSE UR STRIP.AUTO-MCNC: NEGATIVE MG/DL
HCG UR QL: NEGATIVE
HCT VFR BLD AUTO: 41.6 % (ref 35–47)
HGB BLD-MCNC: 12.4 G/DL (ref 11.5–16)
HGB UR QL STRIP: NEGATIVE
HYALINE CASTS URNS QL MICRO: ABNORMAL /LPF (ref 0–2)
IMM GRANULOCYTES # BLD AUTO: 0 K/UL (ref 0–0.04)
IMM GRANULOCYTES NFR BLD AUTO: 0 % (ref 0–0.5)
KETONES UR QL STRIP.AUTO: ABNORMAL MG/DL
LEUKOCYTE ESTERASE UR QL STRIP.AUTO: ABNORMAL
LIPASE SERPL-CCNC: 35 U/L (ref 13–75)
LYMPHOCYTES # BLD: 1.7 K/UL (ref 0.8–3.5)
LYMPHOCYTES NFR BLD: 29 % (ref 12–49)
MCH RBC QN AUTO: 26.7 PG (ref 26–34)
MCHC RBC AUTO-ENTMCNC: 29.8 G/DL (ref 30–36.5)
MCV RBC AUTO: 89.5 FL (ref 80–99)
MONOCYTES # BLD: 0.5 K/UL (ref 0–1)
MONOCYTES NFR BLD: 8 % (ref 5–13)
NEUTS SEG # BLD: 3.5 K/UL (ref 1.8–8)
NEUTS SEG NFR BLD: 59 % (ref 32–75)
NITRITE UR QL STRIP.AUTO: NEGATIVE
NRBC # BLD: 0 K/UL (ref 0–0.01)
NRBC BLD-RTO: 0 PER 100 WBC
PH UR STRIP: 6.5 (ref 5–8)
PLATELET # BLD AUTO: 203 K/UL (ref 150–400)
PMV BLD AUTO: 11.5 FL (ref 8.9–12.9)
POTASSIUM SERPL-SCNC: 3.9 MMOL/L (ref 3.5–5.1)
PROT SERPL-MCNC: 6.4 G/DL (ref 6.4–8.2)
PROT UR STRIP-MCNC: ABNORMAL MG/DL
RBC # BLD AUTO: 4.65 M/UL (ref 3.8–5.2)
RBC #/AREA URNS HPF: ABNORMAL /HPF (ref 0–5)
SODIUM SERPL-SCNC: 144 MMOL/L (ref 136–145)
SP GR UR REFRACTOMETRY: 1.02
URINE CULTURE IF INDICATED: ABNORMAL
UROBILINOGEN UR QL STRIP.AUTO: 1 EU/DL (ref 0.2–1)
WBC # BLD AUTO: 6 K/UL (ref 3.6–11)
WBC URNS QL MICRO: ABNORMAL /HPF (ref 0–4)

## 2023-10-16 PROCEDURE — 81001 URINALYSIS AUTO W/SCOPE: CPT

## 2023-10-16 PROCEDURE — 96374 THER/PROPH/DIAG INJ IV PUSH: CPT

## 2023-10-16 PROCEDURE — 6360000002 HC RX W HCPCS: Performed by: STUDENT IN AN ORGANIZED HEALTH CARE EDUCATION/TRAINING PROGRAM

## 2023-10-16 PROCEDURE — 80053 COMPREHEN METABOLIC PANEL: CPT

## 2023-10-16 PROCEDURE — 99285 EMERGENCY DEPT VISIT HI MDM: CPT

## 2023-10-16 PROCEDURE — 85025 COMPLETE CBC W/AUTO DIFF WBC: CPT

## 2023-10-16 PROCEDURE — 81025 URINE PREGNANCY TEST: CPT

## 2023-10-16 PROCEDURE — 2580000003 HC RX 258: Performed by: STUDENT IN AN ORGANIZED HEALTH CARE EDUCATION/TRAINING PROGRAM

## 2023-10-16 PROCEDURE — 36415 COLL VENOUS BLD VENIPUNCTURE: CPT

## 2023-10-16 PROCEDURE — 96361 HYDRATE IV INFUSION ADD-ON: CPT

## 2023-10-16 PROCEDURE — 96375 TX/PRO/DX INJ NEW DRUG ADDON: CPT

## 2023-10-16 PROCEDURE — 6360000004 HC RX CONTRAST MEDICATION: Performed by: STUDENT IN AN ORGANIZED HEALTH CARE EDUCATION/TRAINING PROGRAM

## 2023-10-16 PROCEDURE — 83690 ASSAY OF LIPASE: CPT

## 2023-10-16 PROCEDURE — 87086 URINE CULTURE/COLONY COUNT: CPT

## 2023-10-16 PROCEDURE — 6370000000 HC RX 637 (ALT 250 FOR IP): Performed by: STUDENT IN AN ORGANIZED HEALTH CARE EDUCATION/TRAINING PROGRAM

## 2023-10-16 PROCEDURE — 74177 CT ABD & PELVIS W/CONTRAST: CPT

## 2023-10-16 RX ORDER — ONDANSETRON 2 MG/ML
4 INJECTION INTRAMUSCULAR; INTRAVENOUS ONCE
Status: COMPLETED | OUTPATIENT
Start: 2023-10-16 | End: 2023-10-16

## 2023-10-16 RX ORDER — OXYCODONE HYDROCHLORIDE 5 MG/1
5 TABLET ORAL ONCE
Status: COMPLETED | OUTPATIENT
Start: 2023-10-16 | End: 2023-10-16

## 2023-10-16 RX ORDER — CYCLOBENZAPRINE HCL 10 MG
10 TABLET ORAL 3 TIMES DAILY PRN
Qty: 21 TABLET | Refills: 0 | Status: SHIPPED | OUTPATIENT
Start: 2023-10-16 | End: 2023-10-26

## 2023-10-16 RX ORDER — SODIUM CHLORIDE, SODIUM LACTATE, POTASSIUM CHLORIDE, AND CALCIUM CHLORIDE .6; .31; .03; .02 G/100ML; G/100ML; G/100ML; G/100ML
1000 INJECTION, SOLUTION INTRAVENOUS ONCE
Status: COMPLETED | OUTPATIENT
Start: 2023-10-16 | End: 2023-10-16

## 2023-10-16 RX ORDER — KETOROLAC TROMETHAMINE 30 MG/ML
15 INJECTION, SOLUTION INTRAMUSCULAR; INTRAVENOUS ONCE
Status: COMPLETED | OUTPATIENT
Start: 2023-10-16 | End: 2023-10-16

## 2023-10-16 RX ADMIN — IOPAMIDOL 100 ML: 755 INJECTION, SOLUTION INTRAVENOUS at 10:48

## 2023-10-16 RX ADMIN — OXYCODONE HYDROCHLORIDE 5 MG: 5 TABLET ORAL at 11:06

## 2023-10-16 RX ADMIN — KETOROLAC TROMETHAMINE 15 MG: 30 INJECTION, SOLUTION INTRAMUSCULAR; INTRAVENOUS at 10:02

## 2023-10-16 RX ADMIN — ONDANSETRON 4 MG: 2 INJECTION INTRAMUSCULAR; INTRAVENOUS at 10:01

## 2023-10-16 RX ADMIN — SODIUM CHLORIDE, POTASSIUM CHLORIDE, SODIUM LACTATE AND CALCIUM CHLORIDE 1000 ML: 600; 310; 30; 20 INJECTION, SOLUTION INTRAVENOUS at 10:03

## 2023-10-16 ASSESSMENT — PAIN SCALES - GENERAL: PAINLEVEL_OUTOF10: 9

## 2023-10-16 NOTE — ED PROVIDER NOTES
acyclovir 400 MG tablet  Commonly known as: ZOVIRAX     Aimovig 140 MG/ML Soaj  Generic drug: Erenumab-aooe     albuterol sulfate  (90 Base) MCG/ACT inhaler  Commonly known as: PROVENTIL;VENTOLIN;PROAIR     ascorbic acid 500 MG tablet  Commonly known as: VITAMIN C     atenolol 50 MG tablet  Commonly known as: TENORMIN     atorvastatin 20 MG tablet  Commonly known as: LIPITOR     busPIRone 15 MG tablet  Commonly known as: BUSPAR     butalbital-APAP-caffeine -40 MG Caps per capsule     clonazePAM 1 MG tablet  Commonly known as: KLONOPIN     cyanocobalamin 1000 MCG/ML injection     diclofenac 75 MG EC tablet  Commonly known as: VOLTAREN     diphenhydrAMINE 25 MG capsule  Commonly known as: BENADRYL     eletriptan 40 MG tablet  Commonly known as: RELPAX     EPINEPHrine 0.3 MG/0.3ML Soaj injection  Commonly known as: EPIPEN     etodolac 400 MG tablet  Commonly known as: LODINE     fluticasone-salmeterol 250-50 MCG/ACT Aepb diskus inhaler  Commonly known as: ADVAIR     Magnesium Oxide 250 MG Tabs tablet  Commonly known as: MAGNESIUM-OXIDE     metaxalone 800 MG tablet  Commonly known as: SKELAXIN     metFORMIN 1000 MG tablet  Commonly known as: GLUCOPHAGE     MULTIPLE VITAMINS/WOMENS PO     omeprazole 20 MG delayed release capsule  Commonly known as: PRILOSEC     Onabotulinumtoxin A 200 units injection  Commonly known as: BOTOX     ondansetron 4 MG tablet  Commonly known as: ZOFRAN     Orilissa 200 MG Tabs  Generic drug: Elagolix Sodium     pantoprazole 40 MG tablet  Commonly known as: PROTONIX     pregabalin 100 MG capsule  Commonly known as: LYRICA  Take 1 capsule by mouth in the morning and at bedtime for 180 days.  Max Daily Amount: 200 mg     PRENATAL VITAMIN PO     prochlorperazine 10 MG tablet  Commonly known as: COMPAZINE     promethazine 25 MG tablet  Commonly known as: PHENERGAN     * QUEtiapine 100 MG tablet  Commonly known as: SEROQUEL     * QUEtiapine 50 MG tablet  Commonly known as: SEROQUEL

## 2023-10-16 NOTE — ED NOTES
DC info reviewed with patient, all questions answered. Patient well-appearing at time of discharge and vital signs stable. Ambulatory out of ED at this time.        Lucie Ramos RN  10/16/23 [id number]

## 2023-10-17 LAB
BACTERIA SPEC CULT: NORMAL
SERVICE CMNT-IMP: NORMAL

## 2023-11-11 ENCOUNTER — HOSPITAL ENCOUNTER (OUTPATIENT)
Facility: HOSPITAL | Age: 48
End: 2023-11-11
Attending: PSYCHIATRY & NEUROLOGY
Payer: COMMERCIAL

## 2023-11-11 DIAGNOSIS — G44.201 INTRACTABLE TENSION-TYPE HEADACHE, UNSPECIFIED CHRONICITY PATTERN: ICD-10-CM

## 2023-11-11 DIAGNOSIS — R42 DIZZINESS AND GIDDINESS: ICD-10-CM

## 2023-11-11 DIAGNOSIS — F07.81 POSTCONCUSSION SYNDROME: ICD-10-CM

## 2023-11-11 PROCEDURE — 70553 MRI BRAIN STEM W/O & W/DYE: CPT

## 2023-11-11 PROCEDURE — 6360000004 HC RX CONTRAST MEDICATION: Performed by: PSYCHIATRY & NEUROLOGY

## 2023-11-11 PROCEDURE — A9579 GAD-BASE MR CONTRAST NOS,1ML: HCPCS | Performed by: PSYCHIATRY & NEUROLOGY

## 2023-11-11 RX ADMIN — GADOTERIDOL 20 ML: 279.3 INJECTION, SOLUTION INTRAVENOUS at 14:32

## 2023-11-22 RX ORDER — QUETIAPINE FUMARATE 50 MG/1
50 TABLET, FILM COATED ORAL NIGHTLY
Qty: 30 TABLET | Refills: 3 | Status: CANCELLED | OUTPATIENT
Start: 2023-11-22

## 2023-11-22 RX ORDER — QUETIAPINE FUMARATE 100 MG/1
100 TABLET, FILM COATED ORAL NIGHTLY
Qty: 30 TABLET | Refills: 3 | Status: CANCELLED | OUTPATIENT
Start: 2023-11-22

## 2023-11-22 NOTE — TELEPHONE ENCOUNTER
Received fax from [facility] requesting refill for Seroquel 50 mg tablets and Seroquel 100 mg tablets.      Last office visit 06/05/2023  Last med refill: 09/23/2022

## 2023-11-24 NOTE — TELEPHONE ENCOUNTER
Patient pharmacy [facility] requested refill for Quetiapine 100 mg tablets. And also refill for Quetiapine 50 mg tablets.

## 2023-11-28 ENCOUNTER — HOSPITAL ENCOUNTER (EMERGENCY)
Facility: HOSPITAL | Age: 48
Discharge: HOME | End: 2023-11-28
Attending: EMERGENCY MEDICINE
Payer: COMMERCIAL

## 2023-11-28 VITALS
SYSTOLIC BLOOD PRESSURE: 110 MMHG | TEMPERATURE: 97.5 F | BODY MASS INDEX: 45.24 KG/M2 | OXYGEN SATURATION: 98 % | RESPIRATION RATE: 20 BRPM | DIASTOLIC BLOOD PRESSURE: 65 MMHG | WEIGHT: 239.42 LBS | HEART RATE: 87 BPM

## 2023-11-28 DIAGNOSIS — R10.84 GENERALIZED ABDOMINAL PAIN: ICD-10-CM

## 2023-11-28 DIAGNOSIS — R51.9 ACUTE INTRACTABLE HEADACHE, UNSPECIFIED HEADACHE TYPE: ICD-10-CM

## 2023-11-28 DIAGNOSIS — R25.1 TREMOR: ICD-10-CM

## 2023-11-28 DIAGNOSIS — M79.7 FIBROMYALGIA: Primary | ICD-10-CM

## 2023-11-28 LAB
ALBUMIN SERPL-MCNC: 3.5 G/DL (ref 3.5–5)
ALBUMIN/GLOB SERPL: 0.9 (ref 1.1–2.2)
ALP SERPL-CCNC: 108 U/L (ref 45–117)
ALT SERPL-CCNC: 26 U/L (ref 12–78)
ANION GAP SERPL CALC-SCNC: 5 MMOL/L (ref 5–15)
AST SERPL-CCNC: 16 U/L (ref 15–37)
BASOPHILS # BLD: 0 K/UL (ref 0–0.1)
BASOPHILS NFR BLD: 0 % (ref 0–1)
BILIRUB SERPL-MCNC: 0.2 MG/DL (ref 0.2–1)
BUN SERPL-MCNC: 17 MG/DL (ref 6–20)
BUN/CREAT SERPL: 10 (ref 12–20)
CALCIUM SERPL-MCNC: 9.5 MG/DL (ref 8.5–10.1)
CHLORIDE SERPL-SCNC: 114 MMOL/L (ref 97–108)
CO2 SERPL-SCNC: 25 MMOL/L (ref 21–32)
CREAT SERPL-MCNC: 1.66 MG/DL (ref 0.55–1.02)
DIFFERENTIAL METHOD BLD: ABNORMAL
EOSINOPHIL # BLD: 0.1 K/UL (ref 0–0.4)
EOSINOPHIL NFR BLD: 1 % (ref 0–7)
ERYTHROCYTE [DISTWIDTH] IN BLOOD BY AUTOMATED COUNT: 16.9 % (ref 11.5–14.5)
GLOBULIN SER CALC-MCNC: 4 G/DL (ref 2–4)
GLUCOSE SERPL-MCNC: 109 MG/DL (ref 65–100)
HCT VFR BLD AUTO: 43.7 % (ref 35–47)
HGB BLD-MCNC: 13.2 G/DL (ref 11.5–16)
IMM GRANULOCYTES # BLD AUTO: 0 K/UL (ref 0–0.04)
IMM GRANULOCYTES NFR BLD AUTO: 0 % (ref 0–0.5)
LYMPHOCYTES # BLD: 2 K/UL (ref 0.8–3.5)
LYMPHOCYTES NFR BLD: 20 % (ref 12–49)
MAGNESIUM SERPL-MCNC: 2.2 MG/DL (ref 1.6–2.4)
MCH RBC QN AUTO: 27.1 PG (ref 26–34)
MCHC RBC AUTO-ENTMCNC: 30.2 G/DL (ref 30–36.5)
MCV RBC AUTO: 89.7 FL (ref 80–99)
MONOCYTES # BLD: 0.7 K/UL (ref 0–1)
MONOCYTES NFR BLD: 7 % (ref 5–13)
NEUTS SEG # BLD: 7 K/UL (ref 1.8–8)
NEUTS SEG NFR BLD: 72 % (ref 32–75)
NRBC # BLD: 0 K/UL (ref 0–0.01)
NRBC BLD-RTO: 0 PER 100 WBC
PLATELET # BLD AUTO: 263 K/UL (ref 150–400)
PMV BLD AUTO: 12.2 FL (ref 8.9–12.9)
POTASSIUM SERPL-SCNC: 4 MMOL/L (ref 3.5–5.1)
PROT SERPL-MCNC: 7.5 G/DL (ref 6.4–8.2)
RBC # BLD AUTO: 4.87 M/UL (ref 3.8–5.2)
SODIUM SERPL-SCNC: 144 MMOL/L (ref 136–145)
WBC # BLD AUTO: 9.9 K/UL (ref 3.6–11)

## 2023-11-28 PROCEDURE — 96374 THER/PROPH/DIAG INJ IV PUSH: CPT

## 2023-11-28 PROCEDURE — 85025 COMPLETE CBC W/AUTO DIFF WBC: CPT

## 2023-11-28 PROCEDURE — 36415 COLL VENOUS BLD VENIPUNCTURE: CPT

## 2023-11-28 PROCEDURE — 83735 ASSAY OF MAGNESIUM: CPT

## 2023-11-28 PROCEDURE — 6370000000 HC RX 637 (ALT 250 FOR IP): Performed by: EMERGENCY MEDICINE

## 2023-11-28 PROCEDURE — 99284 EMERGENCY DEPT VISIT MOD MDM: CPT

## 2023-11-28 PROCEDURE — 96375 TX/PRO/DX INJ NEW DRUG ADDON: CPT

## 2023-11-28 PROCEDURE — 6360000002 HC RX W HCPCS: Performed by: EMERGENCY MEDICINE

## 2023-11-28 PROCEDURE — 80053 COMPREHEN METABOLIC PANEL: CPT

## 2023-11-28 RX ORDER — ONDANSETRON 2 MG/ML
4 INJECTION INTRAMUSCULAR; INTRAVENOUS ONCE
Status: COMPLETED | OUTPATIENT
Start: 2023-11-28 | End: 2023-11-28

## 2023-11-28 RX ORDER — DIAZEPAM 5 MG/1
5 TABLET ORAL ONCE
Status: COMPLETED | OUTPATIENT
Start: 2023-11-28 | End: 2023-11-28

## 2023-11-28 RX ORDER — QUETIAPINE FUMARATE 100 MG/1
100 TABLET, FILM COATED ORAL NIGHTLY
Qty: 60 TABLET | Refills: 1 | Status: SHIPPED | OUTPATIENT
Start: 2023-11-28

## 2023-11-28 RX ORDER — DIAZEPAM 5 MG/1
5 TABLET ORAL EVERY 8 HOURS PRN
Qty: 15 TABLET | Refills: 0 | Status: SHIPPED | OUTPATIENT
Start: 2023-11-28 | End: 2023-12-08

## 2023-11-28 RX ORDER — KETOROLAC TROMETHAMINE 30 MG/ML
30 INJECTION, SOLUTION INTRAMUSCULAR; INTRAVENOUS
Status: COMPLETED | OUTPATIENT
Start: 2023-11-28 | End: 2023-11-28

## 2023-11-28 RX ORDER — QUETIAPINE FUMARATE 50 MG/1
50 TABLET, FILM COATED ORAL NIGHTLY
Qty: 60 TABLET | Refills: 1 | Status: SHIPPED | OUTPATIENT
Start: 2023-11-28

## 2023-11-28 RX ADMIN — ONDANSETRON 4 MG: 2 INJECTION INTRAMUSCULAR; INTRAVENOUS at 14:21

## 2023-11-28 RX ADMIN — DIAZEPAM 5 MG: 5 TABLET ORAL at 14:20

## 2023-11-28 RX ADMIN — KETOROLAC TROMETHAMINE 30 MG: 30 INJECTION, SOLUTION INTRAMUSCULAR; INTRAVENOUS at 14:20

## 2023-11-28 ASSESSMENT — PAIN SCALES - GENERAL: PAINLEVEL_OUTOF10: 9

## 2023-11-28 ASSESSMENT — ENCOUNTER SYMPTOMS: ABDOMINAL PAIN: 1

## 2023-11-28 NOTE — ED NOTES
DC papers reviewed and in hand, pt verbalized understanding. Patient ambulatory out of ED with steady gait, no acute distress noted.         Fela Rivero RN  11/28/23 [id number]

## 2023-11-29 NOTE — ED PROVIDER NOTES
EMERGENCY DEPARTMENT HISTORY AND PHYSICAL EXAM    Date: 11/28/2023  Patient Name: Sai Sequeira  Patient Age and Sex: 46 y.o. female  MRN:  [id number]  CSN:  [id number]    History of Present Illness     Chief Complaint   Patient presents with    Headache     X1 week    Fall     Patient reports shaking and then falling yesterday. No LOC or head injury with falls. History Provided By: Patient    Ability to gather history was limited by:     HPI: Sai Sequeira, 46 y.o. female   With history of fibromyalgia, anxiety, depression, presents with a variety of vague symptoms including headache for the past week, tremors and feeling shaky all over for the past week, occasional mild falls, abdominal cramping. Tobacco Use      Smoking status: Never      Smokeless tobacco: Never     Past History   The patient's medical, surgical, and social history were reviewed by me today. Current Medications:  No current facility-administered medications on file prior to encounter. Current Outpatient Medications on File Prior to Encounter   Medication Sig Dispense Refill    QUEtiapine (SEROQUEL) 100 MG tablet Take 1 tablet by mouth nightly at bedtime 60 tablet 1    QUEtiapine (SEROQUEL) 50 MG tablet Take 1 tablet by mouth nightly 60 tablet 1    Multiple Vitamins-Minerals (MULTIPLE VITAMINS/WOMENS PO) Take by mouth      Prenatal Vit-Fe Fumarate-FA (PRENATAL VITAMIN PO) Take by mouth      pregabalin (LYRICA) 100 MG capsule Take 1 capsule by mouth in the morning and at bedtime for 180 days.  Max Daily Amount: 200 mg 180 capsule 1    acetaZOLAMIDE (DIAMOX) 500 MG extended release capsule Take 1 capsule by mouth 2 times daily      acyclovir (ZOVIRAX) 400 MG tablet Take 1 tablet by mouth as needed      albuterol sulfate HFA (PROVENTIL;VENTOLIN;PROAIR) 108 (90 Base) MCG/ACT inhaler Inhale 1 puff into the lungs every 6 hours as needed      ascorbic acid (VITAMIN C) 500 MG tablet Take 1 tablet by mouth daily

## 2023-12-06 DIAGNOSIS — G43.709 CHRONIC MIGRAINE W/O AURA W/O STATUS MIGRAINOSUS, NOT INTRACTABLE: Primary | ICD-10-CM

## 2023-12-06 NOTE — TELEPHONE ENCOUNTER
Santo Mcmillan I checked her last prescription was written 10/17/22 so she just needs a new prescription     She has two procedures coming up with you for botox     Once the prescription is written the nurse should know the procedure to follow to so April can obtain the Prior Authorization    Let me know if there is anything else needed from me

## 2023-12-12 ENCOUNTER — TELEPHONE (OUTPATIENT)
Age: 48
End: 2023-12-12

## 2023-12-12 NOTE — TELEPHONE ENCOUNTER
Drug Acquisition: Pharmacy   Drug: BOTOX 200 units, Dx: G43.709 (Chronic Migraine)  Insurance: [employer]  Submission Type: [facility]  HCP:   Key: [id number]  PA Case ID: [id number]  Approval Range: 12/12/23 to 03/12/24  SPP:  [facility] [phone]    FYI to: Anita LEWIS

## 2023-12-16 ENCOUNTER — TELEPHONE (OUTPATIENT)
Age: 48
End: 2023-12-16

## 2023-12-16 NOTE — TELEPHONE ENCOUNTER
PSR called patient who stated she will have the same insurance for next year. No need to update her prior authorization for her upcoming Botox appointment.

## 2024-01-04 ENCOUNTER — HOSPITAL ENCOUNTER (OUTPATIENT)
Facility: HOSPITAL | Age: 49
Discharge: HOME | End: 2024-01-04
Attending: INTERNAL MEDICINE
Payer: COMMERCIAL

## 2024-01-04 DIAGNOSIS — R10.816 EPIGASTRIC ABDOMINAL TENDERNESS ON DIRECT PALPATION: ICD-10-CM

## 2024-01-04 PROCEDURE — 74160 CT ABDOMEN W/CONTRAST: CPT

## 2024-01-04 PROCEDURE — 6360000004 HC RX CONTRAST MEDICATION: Performed by: INTERNAL MEDICINE

## 2024-01-04 RX ADMIN — IOPAMIDOL 100 ML: 755 INJECTION, SOLUTION INTRAVENOUS at 12:14

## 2024-01-22 ENCOUNTER — TELEPHONE (OUTPATIENT)
Age: 49
End: 2024-01-22

## 2024-01-22 NOTE — TELEPHONE ENCOUNTER
Patient calling to state she needs to have her botox sooner than April. She states she gets it every three months. Please give her a call to schedule. Thank you!

## 2024-02-09 ENCOUNTER — PROCEDURE VISIT (OUTPATIENT)
Age: 49
End: 2024-02-09

## 2024-02-09 DIAGNOSIS — G43.719 CHRONIC MIGRAINE WITHOUT AURA, INTRACTABLE, WITHOUT STATUS MIGRAINOSUS: Primary | ICD-10-CM

## 2024-02-09 DIAGNOSIS — M79.7 FIBROMYALGIA: ICD-10-CM

## 2024-02-09 RX ORDER — PREGABALIN 100 MG/1
100 CAPSULE ORAL 2 TIMES DAILY
Qty: 180 CAPSULE | Refills: 1 | Status: SHIPPED | OUTPATIENT
Start: 2024-02-09 | End: 2025-02-05

## 2024-02-09 RX ORDER — PREGABALIN 100 MG/1
100 CAPSULE ORAL 2 TIMES DAILY
Qty: 180 CAPSULE | Refills: 1 | Status: SHIPPED | OUTPATIENT
Start: 2024-02-09 | End: 2024-08-07

## 2024-02-09 NOTE — PROGRESS NOTES
Botox Injection Note       ICD-10-CM    1. Chronic migraine without aura, intractable, without status migrainosus  G43.719       2. Fibromyalgia  M79.7            Indication: patient has chronic recurrent migraine.      Procedure:   Botox concentration: 200 units in 4 ml of preservative-free normal saline.       31 sites injections, distribution as follow      Units/site  Sites Sides Subtotal    Procerus 5 1 1 5    5 1 2 10   Frontalis 5 2 2 20   Temporalis 5 4 2 40   Occipitalis 5 3 2 30   Upper cervical paraspinalis 5 2 2 20   Trapezius 5 3 2 30         200 units Botox were reconstituted, 155 units injected as above and the remainder was unavoidably wasted.     Patient tolerated procedure well.   Last Procedure 10/2023.    Meds:  Prior abortive tx:     Prior preventative tx:Depakote  Zonegran  Verapamil  Elavil  Pamelor  Botox injection  Cymbalta  Neurontin  Lyrica     Current abortive tx: Ubrelvy, Relpax,  Current preventative tx: Aimovig, Botox        _____________________________   laurel Goldman APRN - NP

## 2024-02-14 ENCOUNTER — TELEPHONE (OUTPATIENT)
Age: 49
End: 2024-02-14

## 2024-02-14 NOTE — TELEPHONE ENCOUNTER
Drug Acquisition: BUY AND BILL  Drug: BOTOX 200 units Dx: G43.719  Insurance: [facility]  Submission Type: [facility] portal  HCPCS:  (Botox drug)  CPT: 43879 (Procedure code)  Reference #: [id number]  Approval Range: NO PA REQUIRED

## 2024-02-26 ENCOUNTER — TELEPHONE (OUTPATIENT)
Age: 49
End: 2024-02-26

## 2024-02-26 NOTE — TELEPHONE ENCOUNTER
Peg with [facility] calling to schedule a botox delivery. Please give her a call back at . Thank you.

## 2024-03-07 ENCOUNTER — TELEPHONE (OUTPATIENT)
Age: 49
End: 2024-03-07

## 2024-03-18 ENCOUNTER — TRANSCRIBE ORDERS (OUTPATIENT)
Facility: HOSPITAL | Age: 49
End: 2024-03-18

## 2024-03-18 DIAGNOSIS — N28.9 KIDNEY LESION: Primary | ICD-10-CM

## 2024-03-31 ENCOUNTER — APPOINTMENT (OUTPATIENT)
Facility: HOSPITAL | Age: 49
End: 2024-03-31
Payer: COMMERCIAL

## 2024-03-31 ENCOUNTER — HOSPITAL ENCOUNTER (EMERGENCY)
Facility: HOSPITAL | Age: 49
Discharge: HOME | End: 2024-03-31
Attending: STUDENT IN AN ORGANIZED HEALTH CARE EDUCATION/TRAINING PROGRAM
Payer: COMMERCIAL

## 2024-03-31 VITALS
RESPIRATION RATE: 16 BRPM | DIASTOLIC BLOOD PRESSURE: 81 MMHG | TEMPERATURE: 97.8 F | HEART RATE: 79 BPM | SYSTOLIC BLOOD PRESSURE: 98 MMHG | OXYGEN SATURATION: 94 %

## 2024-03-31 DIAGNOSIS — S09.90XA INJURY OF HEAD, INITIAL ENCOUNTER: Primary | ICD-10-CM

## 2024-03-31 LAB
ALBUMIN SERPL-MCNC: 3.3 G/DL (ref 3.5–5)
ALBUMIN/GLOB SERPL: 0.9 (ref 1.1–2.2)
ALP SERPL-CCNC: 100 U/L (ref 45–117)
ALT SERPL-CCNC: 40 U/L (ref 12–78)
ANION GAP SERPL CALC-SCNC: 4 MMOL/L (ref 5–15)
APPEARANCE UR: ABNORMAL
AST SERPL-CCNC: 34 U/L (ref 15–37)
BACTERIA URNS QL MICRO: NEGATIVE /HPF
BASOPHILS # BLD: 0 K/UL (ref 0–0.1)
BASOPHILS NFR BLD: 0 % (ref 0–1)
BILIRUB SERPL-MCNC: 0.3 MG/DL (ref 0.2–1)
BILIRUB UR QL: NEGATIVE
BUN SERPL-MCNC: 20 MG/DL (ref 6–20)
BUN/CREAT SERPL: 10 (ref 12–20)
CALCIUM SERPL-MCNC: 9.1 MG/DL (ref 8.5–10.1)
CHLORIDE SERPL-SCNC: 113 MMOL/L (ref 97–108)
CO2 SERPL-SCNC: 24 MMOL/L (ref 21–32)
COLOR UR: ABNORMAL
CREAT SERPL-MCNC: 1.93 MG/DL (ref 0.55–1.02)
DIFFERENTIAL METHOD BLD: ABNORMAL
EOSINOPHIL # BLD: 0.2 K/UL (ref 0–0.4)
EOSINOPHIL NFR BLD: 2 % (ref 0–7)
EPITH CASTS URNS QL MICRO: ABNORMAL /LPF
ERYTHROCYTE [DISTWIDTH] IN BLOOD BY AUTOMATED COUNT: 15.4 % (ref 11.5–14.5)
GLOBULIN SER CALC-MCNC: 3.7 G/DL (ref 2–4)
GLUCOSE SERPL-MCNC: 105 MG/DL (ref 65–100)
GLUCOSE UR STRIP.AUTO-MCNC: NEGATIVE MG/DL
HCT VFR BLD AUTO: 42.5 % (ref 35–47)
HGB BLD-MCNC: 12.7 G/DL (ref 11.5–16)
HGB UR QL STRIP: NEGATIVE
IMM GRANULOCYTES # BLD AUTO: 0 K/UL (ref 0–0.04)
IMM GRANULOCYTES NFR BLD AUTO: 0 % (ref 0–0.5)
KETONES UR QL STRIP.AUTO: ABNORMAL MG/DL
LEUKOCYTE ESTERASE UR QL STRIP.AUTO: ABNORMAL
LYMPHOCYTES # BLD: 1.8 K/UL (ref 0.8–3.5)
LYMPHOCYTES NFR BLD: 18 % (ref 12–49)
MCH RBC QN AUTO: 27.4 PG (ref 26–34)
MCHC RBC AUTO-ENTMCNC: 29.9 G/DL (ref 30–36.5)
MCV RBC AUTO: 91.6 FL (ref 80–99)
MONOCYTES # BLD: 0.9 K/UL (ref 0–1)
MONOCYTES NFR BLD: 9 % (ref 5–13)
NEUTS SEG # BLD: 7 K/UL (ref 1.8–8)
NEUTS SEG NFR BLD: 71 % (ref 32–75)
NITRITE UR QL STRIP.AUTO: NEGATIVE
NRBC # BLD: 0 K/UL (ref 0–0.01)
NRBC BLD-RTO: 0 PER 100 WBC
PH UR STRIP: 5.5 (ref 5–8)
PLATELET # BLD AUTO: 201 K/UL (ref 150–400)
PMV BLD AUTO: 11.2 FL (ref 8.9–12.9)
POTASSIUM SERPL-SCNC: 3.8 MMOL/L (ref 3.5–5.1)
PROT SERPL-MCNC: 7 G/DL (ref 6.4–8.2)
PROT UR STRIP-MCNC: ABNORMAL MG/DL
RBC # BLD AUTO: 4.64 M/UL (ref 3.8–5.2)
RBC #/AREA URNS HPF: ABNORMAL /HPF (ref 0–5)
SODIUM SERPL-SCNC: 141 MMOL/L (ref 136–145)
SP GR UR REFRACTOMETRY: 1.02
URINE CULTURE IF INDICATED: ABNORMAL
UROBILINOGEN UR QL STRIP.AUTO: 0.2 EU/DL (ref 0.2–1)
WBC # BLD AUTO: 10 K/UL (ref 3.6–11)
WBC URNS QL MICRO: ABNORMAL /HPF (ref 0–4)

## 2024-03-31 PROCEDURE — 96374 THER/PROPH/DIAG INJ IV PUSH: CPT

## 2024-03-31 PROCEDURE — 36415 COLL VENOUS BLD VENIPUNCTURE: CPT

## 2024-03-31 PROCEDURE — 80053 COMPREHEN METABOLIC PANEL: CPT

## 2024-03-31 PROCEDURE — 6370000000 HC RX 637 (ALT 250 FOR IP): Performed by: STUDENT IN AN ORGANIZED HEALTH CARE EDUCATION/TRAINING PROGRAM

## 2024-03-31 PROCEDURE — 72100 X-RAY EXAM L-S SPINE 2/3 VWS: CPT

## 2024-03-31 PROCEDURE — 6360000002 HC RX W HCPCS: Performed by: STUDENT IN AN ORGANIZED HEALTH CARE EDUCATION/TRAINING PROGRAM

## 2024-03-31 PROCEDURE — 99285 EMERGENCY DEPT VISIT HI MDM: CPT

## 2024-03-31 PROCEDURE — 71045 X-RAY EXAM CHEST 1 VIEW: CPT

## 2024-03-31 PROCEDURE — 81001 URINALYSIS AUTO W/SCOPE: CPT

## 2024-03-31 PROCEDURE — 72125 CT NECK SPINE W/O DYE: CPT

## 2024-03-31 PROCEDURE — 85025 COMPLETE CBC W/AUTO DIFF WBC: CPT

## 2024-03-31 PROCEDURE — 70450 CT HEAD/BRAIN W/O DYE: CPT

## 2024-03-31 PROCEDURE — 93005 ELECTROCARDIOGRAM TRACING: CPT | Performed by: STUDENT IN AN ORGANIZED HEALTH CARE EDUCATION/TRAINING PROGRAM

## 2024-03-31 PROCEDURE — 2580000003 HC RX 258: Performed by: STUDENT IN AN ORGANIZED HEALTH CARE EDUCATION/TRAINING PROGRAM

## 2024-03-31 PROCEDURE — 96375 TX/PRO/DX INJ NEW DRUG ADDON: CPT

## 2024-03-31 RX ORDER — 0.9 % SODIUM CHLORIDE 0.9 %
1000 INTRAVENOUS SOLUTION INTRAVENOUS ONCE
Status: COMPLETED | OUTPATIENT
Start: 2024-03-31 | End: 2024-03-31

## 2024-03-31 RX ORDER — DIPHENHYDRAMINE HYDROCHLORIDE 50 MG/ML
25 INJECTION INTRAMUSCULAR; INTRAVENOUS
Status: COMPLETED | OUTPATIENT
Start: 2024-03-31 | End: 2024-03-31

## 2024-03-31 RX ORDER — OXYCODONE HYDROCHLORIDE 5 MG/1
5 TABLET ORAL
Status: COMPLETED | OUTPATIENT
Start: 2024-03-31 | End: 2024-03-31

## 2024-03-31 RX ORDER — PROCHLORPERAZINE EDISYLATE 5 MG/ML
10 INJECTION INTRAMUSCULAR; INTRAVENOUS ONCE
Status: COMPLETED | OUTPATIENT
Start: 2024-03-31 | End: 2024-03-31

## 2024-03-31 RX ADMIN — PROCHLORPERAZINE EDISYLATE 10 MG: 5 INJECTION INTRAMUSCULAR; INTRAVENOUS at 21:04

## 2024-03-31 RX ADMIN — SODIUM CHLORIDE 1000 ML: 9 INJECTION, SOLUTION INTRAVENOUS at 21:35

## 2024-03-31 RX ADMIN — OXYCODONE 5 MG: 5 TABLET ORAL at 21:04

## 2024-03-31 RX ADMIN — DIPHENHYDRAMINE HYDROCHLORIDE 25 MG: 50 INJECTION, SOLUTION INTRAMUSCULAR; INTRAVENOUS at 21:04

## 2024-03-31 NOTE — ED PROVIDER NOTES
bedtime for 180 days. Max Daily Amount: 200 mg     * pregabalin 100 MG capsule  Commonly known as: Lyrica  Take 1 capsule by mouth 2 times daily for 362 days. Max Daily Amount: 200 mg     PRENATAL VITAMIN PO     prochlorperazine 10 MG tablet  Commonly known as: COMPAZINE     promethazine 25 MG tablet  Commonly known as: PHENERGAN     * QUEtiapine 100 MG tablet  Commonly known as: SEROQUEL  Take 1 tablet by mouth nightly at bedtime     * QUEtiapine 50 MG tablet  Commonly known as: SEROQUEL  Take 1 tablet by mouth nightly     rimegepant sulfate 75 MG Tbdp     rosuvastatin 20 MG tablet  Commonly known as: CRESTOR     tamsulosin 0.4 MG capsule  Commonly known as: FLOMAX     tiZANidine 4 MG tablet  Commonly known as: ZANAFLEX     * traZODone 50 MG tablet  Commonly known as: DESYREL     * traZODone 100 MG tablet  Commonly known as: DESYREL  Take 1 tablet by mouth nightly     Ubrogepant 100 MG Tabs     vitamin D 25 MCG (1000 UT) Tabs tablet  Commonly known as: CHOLECALCIFEROL     zonisamide 100 MG capsule  Commonly known as: ZONEGRAN           * This list has 6 medication(s) that are the same as other medications prescribed for you. Read the directions carefully, and ask your doctor or other care provider to review them with you.                    DISCONTINUED MEDICATIONS:  Current Discharge Medication List          I am the Primary Clinician of Record.   Delta Pulliam DO (electronically signed)      (Please note that parts of this dictation were completed with voice recognition software. Quite often unanticipated grammatical, syntax, homophones, and other interpretive errors are inadvertently transcribed by the computer software. Please disregards these errors. Please excuse any errors that have escaped final proofreading.)          Delta Pulliam DO  Resident  04/03/24 [id number]

## 2024-04-01 ENCOUNTER — HOSPITAL ENCOUNTER (OUTPATIENT)
Facility: HOSPITAL | Age: 49
Discharge: HOME | End: 2024-04-04
Attending: UROLOGY
Payer: COMMERCIAL

## 2024-04-01 DIAGNOSIS — N28.9 KIDNEY LESION: ICD-10-CM

## 2024-04-01 LAB
EKG ATRIAL RATE: 72 BPM
EKG DIAGNOSIS: NORMAL
EKG P AXIS: 41 DEGREES
EKG P-R INTERVAL: 164 MS
EKG Q-T INTERVAL: 394 MS
EKG QRS DURATION: 82 MS
EKG QTC CALCULATION (BAZETT): 431 MS
EKG R AXIS: 0 DEGREES
EKG T AXIS: -2 DEGREES
EKG VENTRICULAR RATE: 72 BPM

## 2024-04-01 PROCEDURE — 74170 CT ABD WO CNTRST FLWD CNTRST: CPT

## 2024-04-01 PROCEDURE — 6360000004 HC RX CONTRAST MEDICATION: Performed by: RADIOLOGY

## 2024-04-01 RX ADMIN — IOPAMIDOL 100 ML: 755 INJECTION, SOLUTION INTRAVENOUS at 12:30

## 2024-07-18 ENCOUNTER — TELEPHONE (OUTPATIENT)
Age: 49
End: 2024-07-18

## 2024-07-26 ENCOUNTER — PROCEDURE VISIT (OUTPATIENT)
Age: 49
End: 2024-07-26
Payer: COMMERCIAL

## 2024-07-26 DIAGNOSIS — G43.719 CHRONIC MIGRAINE WITHOUT AURA, INTRACTABLE, WITHOUT STATUS MIGRAINOSUS: Primary | ICD-10-CM

## 2024-07-26 DIAGNOSIS — Z92.29 S/P BOTOX INJECTION: ICD-10-CM

## 2024-07-26 PROCEDURE — 64615 CHEMODENERV MUSC MIGRAINE: CPT | Performed by: NURSE PRACTITIONER

## 2024-07-26 RX ORDER — ELETRIPTAN HYDROBROMIDE 40 MG/1
TABLET, FILM COATED ORAL
Qty: 6 TABLET | Refills: 3 | Status: SHIPPED | OUTPATIENT
Start: 2024-07-26

## 2024-07-26 RX ORDER — BUTALBITAL, ACETAMINOPHEN AND CAFFEINE 300; 40; 50 MG/1; MG/1; MG/1
1 CAPSULE ORAL EVERY 6 HOURS PRN
Qty: 20 CAPSULE | Refills: 1 | Status: SHIPPED | OUTPATIENT
Start: 2024-07-26

## 2024-07-26 NOTE — PROGRESS NOTES
Botox Injection Note       ICD-10-CM    1. Chronic migraine without aura, intractable, without status migrainosus  G43.719 Onabotulinumtoxin A (BOTOX) injection 200 Units      2. S/P Botox injection  Z92.29            Indication: patient has chronic recurrent migraine.      Procedure:   Botox concentration: 200 units in 4 ml of preservative-free normal saline.       31 sites injections, distribution as follow      Units/site  Sites Sides Subtotal    Procerus 5 1 1 5    5 1 2 10   Frontalis 5 2 2 20   Temporalis 5 4 2 40   Occipitalis 5 3 2 30   Upper cervical paraspinalis 5 2 2 20   Trapezius 5 3 2 30         200 units Botox were reconstituted, 155 units injected as above and the remainder was unavoidably wasted.     Patient tolerated procedure well.     With Treatment: current relief is:  1-2 x month, but right now weekly migraines but she is behind on treatments. lasting 3-4 days.   Prior to Botox:  15-20+ days.    Prior abortive tx:     Prior preventative tx:Depakote  Zonegran  Verapamil  Elavil  Pamelor  Botox injection  Cymbalta  Neurontin  Lyrica     Current abortive tx: Ubrelvy, Relpax, Fioricet  Current preventative tx: Aimovig, Botox      _____________________________   laurel Goldman APRN - NP

## 2024-08-01 ENCOUNTER — TELEPHONE (OUTPATIENT)
Age: 49
End: 2024-08-01

## 2024-08-01 NOTE — TELEPHONE ENCOUNTER
RE:Ubrelvy 100 mg tablet renewal request sent to [facility] via [facility]      Case [id number]    [id number]    Status is approved     approved through 07/29/2025    Details in the referral tab     FYI to nurse

## 2024-08-01 NOTE — TELEPHONE ENCOUNTER
Verified patient with 2 identifiers   Advised the Ubrelvy PA has been approved and is valid through 7/29/24. Patient verified understanding.

## 2024-10-25 ENCOUNTER — HOSPITAL ENCOUNTER (OUTPATIENT)
Facility: HOSPITAL | Age: 49
Discharge: HOME | End: 2024-10-28
Attending: PSYCHIATRY & NEUROLOGY

## 2024-10-25 DIAGNOSIS — M54.2 CERVICALGIA: ICD-10-CM

## 2024-10-25 DIAGNOSIS — F07.81 POST CONCUSSION SYNDROME: ICD-10-CM

## 2024-11-06 ENCOUNTER — OFFICE VISIT (OUTPATIENT)
Age: 49
End: 2024-11-06
Payer: COMMERCIAL

## 2024-11-06 DIAGNOSIS — Z92.29 S/P BOTOX INJECTION: ICD-10-CM

## 2024-11-06 DIAGNOSIS — G43.719 CHRONIC MIGRAINE WITHOUT AURA, INTRACTABLE, WITHOUT STATUS MIGRAINOSUS: Primary | ICD-10-CM

## 2024-11-06 PROCEDURE — 64615 CHEMODENERV MUSC MIGRAINE: CPT | Performed by: NURSE PRACTITIONER

## 2024-11-06 NOTE — PROGRESS NOTES
Botox Injection Note       ICD-10-CM    1. Chronic migraine without aura, intractable, without status migrainosus  G43.719       2. S/P Botox injection  Z92.29            Indication: patient has chronic recurrent migraine.      Procedure:   Botox concentration: 200 units in 4 ml of preservative-free normal saline.       31 sites injections, distribution as follow      Units/site  Sites Sides Subtotal    Procerus 5 1 1 5    5 1 2 10   Frontalis 5 2 2 20   Temporalis 5 4 2 40   Occipitalis 5 3 2 30   Upper cervical paraspinalis 5 2 2 20   Trapezius 5 3 2 30         200 units Botox were reconstituted, 155 units injected as above and the remainder was unavoidably wasted.     Patient tolerated procedure well.     With Treatment: current relief is:  3-4 per month.  Prior to Botox:  15+/MONTH  Prior abortive tx:  Current Abortive Is Ubrelvy.     Prior preventative tx:Depakote  Zonegran  Verapamil  Elavil  Pamelor  Botox injection  Cymbalta  Neurontin  Lyrica     Current abortive tx: Ubrelvy, Relpax, Fioricet  Current preventative tx: Aimovig, Botox      _____________________________   laurel Goldman APRN - NP

## 2024-12-26 ENCOUNTER — TELEPHONE (OUTPATIENT)
Age: 49
End: 2024-12-26

## 2024-12-26 NOTE — TELEPHONE ENCOUNTER
Lisbet Cheek     Patient scheduled on 2/5/25 for botox - please double check PA - last February -PA no required

## 2025-01-15 NOTE — TELEPHONE ENCOUNTER
Botox Drug Acquisition: buy and bill  Continuation  POS 22 Druu Btx  Dx: G43.719  Insurance: [facility] [employer]  Submission Type: Portal  \[facility]\"":   CPT: 02166  Reference #: [id number]   Approval Range: PA Approved 25-26

## 2025-02-05 ENCOUNTER — OFFICE VISIT (OUTPATIENT)
Age: 50
End: 2025-02-05
Payer: COMMERCIAL

## 2025-02-05 DIAGNOSIS — G43.719 CHRONIC MIGRAINE WITHOUT AURA, INTRACTABLE, WITHOUT STATUS MIGRAINOSUS: Primary | ICD-10-CM

## 2025-02-05 DIAGNOSIS — Z92.29 S/P BOTOX INJECTION: ICD-10-CM

## 2025-02-05 PROCEDURE — 64615 CHEMODENERV MUSC MIGRAINE: CPT | Performed by: NURSE PRACTITIONER

## 2025-02-05 NOTE — PROGRESS NOTES
Botox Injection Note       ICD-10-CM    1. Chronic migraine without aura, intractable, without status migrainosus  G43.719 Onabotulinumtoxin A (BOTOX) injection 200 Units      2. S/P Botox injection  Z92.29              Indication: patient has chronic recurrent migraine.      Procedure:   Botox concentration: 200 units in 4 ml of preservative-free normal saline.       31 sites injections, distribution as follow      Units/site  Sites Sides Subtotal    Procerus 5 1 1 5    5 1 2 10   Frontalis 5 2 2 20   Temporalis 5 4 2 40   Occipitalis 5 3 2 30   Upper cervical paraspinalis 5 2 2 20   Trapezius 5 3 2 30         200 units Botox were reconstituted, 155 units injected as above and the remainder was unavoidably wasted.   Currently she is having a newer headache on the left side of her head, saw Dr Silva this morning to discuss her symptoms, recommended improved sleep. She Is open to suggestions about what else she can do to try and help her headaches.   Patient tolerated procedure well.     With Treatment: current relief is:  3-4 per month.  Prior to Botox:  15+/MONTH  Prior abortive tx:  Current Abortive Is Ubrelvy.     Prior preventative tx:Depakote  Zonegran  Verapamil  Elavil  Pamelor  Botox injection  Cymbalta  Neurontin  Lyrica     Current abortive tx: Ubrelvy, Relpax, Fioricet  Current preventative tx: Aimovig, Botox      _____________________________   laurel Goldman APRN - NP

## 2025-03-24 ENCOUNTER — TRANSCRIBE ORDERS (OUTPATIENT)
Facility: HOSPITAL | Age: 50
End: 2025-03-24

## 2025-03-24 DIAGNOSIS — M79.661 PAIN IN RIGHT LOWER LEG: Primary | ICD-10-CM

## 2025-03-24 DIAGNOSIS — M79.605 DIFFUSE PAIN IN LEFT LOWER EXTREMITY: ICD-10-CM

## 2025-03-25 ENCOUNTER — TRANSCRIBE ORDERS (OUTPATIENT)
Facility: HOSPITAL | Age: 50
End: 2025-03-25

## 2025-03-25 DIAGNOSIS — M79.661 PAIN IN RIGHT LOWER LEG: Primary | ICD-10-CM

## 2025-03-25 DIAGNOSIS — M79.662 PAIN IN LEFT LOWER LEG: ICD-10-CM

## 2025-03-29 ENCOUNTER — HOSPITAL ENCOUNTER (OUTPATIENT)
Facility: HOSPITAL | Age: 50
Discharge: HOME | End: 2025-04-01
Payer: COMMERCIAL

## 2025-03-29 DIAGNOSIS — M79.661 PAIN IN RIGHT LOWER LEG: ICD-10-CM

## 2025-03-29 DIAGNOSIS — M79.662 PAIN IN LEFT LOWER LEG: ICD-10-CM

## 2025-03-29 PROCEDURE — 93970 EXTREMITY STUDY: CPT

## 2025-05-07 ENCOUNTER — OFFICE VISIT (OUTPATIENT)
Age: 50
End: 2025-05-07
Payer: COMMERCIAL

## 2025-05-07 DIAGNOSIS — Z92.29 S/P BOTOX INJECTION: ICD-10-CM

## 2025-05-07 DIAGNOSIS — G43.719 CHRONIC MIGRAINE WITHOUT AURA, INTRACTABLE, WITHOUT STATUS MIGRAINOSUS: Primary | ICD-10-CM

## 2025-05-07 PROCEDURE — 64615 CHEMODENERV MUSC MIGRAINE: CPT | Performed by: NURSE PRACTITIONER

## 2025-05-07 NOTE — PROGRESS NOTES
Botox Injection Note       ICD-10-CM    1. Chronic migraine without aura, intractable, without status migrainosus  G43.719 Onabotulinumtoxin A (BOTOX) injection 200 Units      2. S/P Botox injection  Z92.29                Indication: patient has chronic recurrent migraine.      Procedure:   Botox concentration: 200 units in 4 ml of preservative-free normal saline.       31 sites injections, distribution as follow      Units/site  Sites Sides Subtotal    Procerus 5 1 1 5    5 1 2 10   Frontalis 5 2 2 20   Temporalis 5 4 2 40   Occipitalis 5 3 2 30   Upper cervical paraspinalis 5 2 2 20   Trapezius 5 3 2 30         200 units Botox were reconstituted, 155 units injected as above and the remainder was unavoidably wasted.      Patient tolerated procedure well.     With Treatment: current relief is:  3-4 per month. She did have to get TPIs with Dr Silva recently as she notes it was a bad month, that seemed to help.  Prior to Botox:  15+/MONTH  Prior abortive tx:  Current Abortive Is Ubrelvy.     Prior preventative tx:Depakote  Zonegran  Verapamil  Elavil  Pamelor  Botox injection  Cymbalta  Neurontin  Lyrica     Current abortive tx: Ubrelvy, Relpax, Fioricet  Current preventative tx: Aimovig, Botox      _____________________________   laurel Goldman APRN - NP

## 2025-08-06 ENCOUNTER — OFFICE VISIT (OUTPATIENT)
Age: 50
End: 2025-08-06
Payer: COMMERCIAL

## 2025-08-06 DIAGNOSIS — Z92.29 S/P BOTOX INJECTION: ICD-10-CM

## 2025-08-06 DIAGNOSIS — G43.719 CHRONIC MIGRAINE WITHOUT AURA, INTRACTABLE, WITHOUT STATUS MIGRAINOSUS: Primary | ICD-10-CM

## 2025-08-06 PROCEDURE — 64615 CHEMODENERV MUSC MIGRAINE: CPT | Performed by: NURSE PRACTITIONER

## 2025-08-06 RX ORDER — ELETRIPTAN HYDROBROMIDE 40 MG/1
TABLET, FILM COATED ORAL
Qty: 9 TABLET | Refills: 3 | Status: SHIPPED | OUTPATIENT
Start: 2025-08-06

## 2025-08-21 RX ORDER — METHYLPREDNISOLONE 4 MG/1
TABLET ORAL
Qty: 21 TABLET | OUTPATIENT
Start: 2025-08-21

## 2025-08-22 ENCOUNTER — TRANSCRIBE ORDERS (OUTPATIENT)
Facility: HOSPITAL | Age: 50
End: 2025-08-22

## 2025-08-22 DIAGNOSIS — N17.9 ACUTE RENAL FAILURE, UNSPECIFIED ACUTE RENAL FAILURE TYPE: Primary | ICD-10-CM

## 2025-08-22 DIAGNOSIS — E11.22 CHRONIC KIDNEY DISEASE DUE TO TYPE 2 DIABETES MELLITUS (HCC): ICD-10-CM

## 2025-08-22 DIAGNOSIS — I12.9 CHRONIC KIDNEY DISEASE DUE TO HYPERTENSION: ICD-10-CM

## 2025-08-22 DIAGNOSIS — N20.0 RENAL CALCULUS: ICD-10-CM

## 2025-08-22 DIAGNOSIS — N18.2 CHRONIC KIDNEY DISEASE, STAGE 2 (MILD): Primary | ICD-10-CM
